# Patient Record
Sex: FEMALE | Race: WHITE | NOT HISPANIC OR LATINO | Employment: FULL TIME | URBAN - METROPOLITAN AREA
[De-identification: names, ages, dates, MRNs, and addresses within clinical notes are randomized per-mention and may not be internally consistent; named-entity substitution may affect disease eponyms.]

---

## 2018-08-24 ENCOUNTER — CONVERSION ENCOUNTER (OUTPATIENT)
Dept: SURGERY | Facility: CLINIC | Age: 23
End: 2018-08-24

## 2018-08-24 ENCOUNTER — OFFICE VISIT CONVERTED (OUTPATIENT)
Dept: SURGERY | Facility: CLINIC | Age: 23
End: 2018-08-24
Attending: SURGERY

## 2019-10-30 ENCOUNTER — HOSPITAL ENCOUNTER (OUTPATIENT)
Dept: LABOR AND DELIVERY | Facility: HOSPITAL | Age: 24
Discharge: HOME | End: 2019-10-30
Attending: OBSTETRICS & GYNECOLOGY

## 2019-10-30 LAB
ALBUMIN SERPL-MCNC: 3.3 G/DL (ref 3.5–5)
ALBUMIN/GLOB SERPL: 0.9 {RATIO} (ref 1.4–2.6)
ALP SERPL-CCNC: 151 U/L (ref 42–98)
ALT SERPL-CCNC: 6 U/L (ref 10–40)
ANION GAP SERPL CALC-SCNC: 16 MMOL/L (ref 8–19)
AST SERPL-CCNC: 12 U/L (ref 15–50)
BASOPHILS # BLD AUTO: 0.02 10*3/UL (ref 0–0.2)
BASOPHILS NFR BLD AUTO: 0.2 % (ref 0–3)
BILIRUB SERPL-MCNC: 0.23 MG/DL (ref 0.2–1.3)
BUN SERPL-MCNC: 9 MG/DL (ref 5–25)
BUN/CREAT SERPL: 10 {RATIO} (ref 6–20)
CALCIUM SERPL-MCNC: 9.7 MG/DL (ref 8.7–10.4)
CHLORIDE SERPL-SCNC: 101 MMOL/L (ref 99–111)
CONV ABS IMM GRAN: 0.03 10*3/UL (ref 0–0.2)
CONV CO2: 20 MMOL/L (ref 22–32)
CONV CREATININE URINE, RANDOM: 143.2 MG/DL (ref 10–300)
CONV IMMATURE GRAN: 0.3 % (ref 0–1.8)
CONV PROTEIN TO CREATININE RATIO (RANDOM URINE): 0.13 {RATIO} (ref 0–0.1)
CONV TOTAL PROTEIN: 6.8 G/DL (ref 6.3–8.2)
CREAT UR-MCNC: 0.89 MG/DL (ref 0.5–0.9)
DEPRECATED RDW RBC AUTO: 43.3 FL (ref 36.4–46.3)
EOSINOPHIL # BLD AUTO: 0.03 10*3/UL (ref 0–0.7)
EOSINOPHIL # BLD AUTO: 0.3 % (ref 0–7)
ERYTHROCYTE [DISTWIDTH] IN BLOOD BY AUTOMATED COUNT: 14.8 % (ref 11.7–14.4)
GFR SERPLBLD BASED ON 1.73 SQ M-ARVRAT: >60 ML/MIN/{1.73_M2}
GLOBULIN UR ELPH-MCNC: 3.5 G/DL (ref 2–3.5)
GLUCOSE SERPL-MCNC: 90 MG/DL (ref 65–99)
HCT VFR BLD AUTO: 33.8 % (ref 37–47)
HGB BLD-MCNC: 10.6 G/DL (ref 12–16)
LYMPHOCYTES # BLD AUTO: 1.84 10*3/UL (ref 1–5)
LYMPHOCYTES NFR BLD AUTO: 21 % (ref 20–45)
MCH RBC QN AUTO: 25.7 PG (ref 27–31)
MCHC RBC AUTO-ENTMCNC: 31.4 G/DL (ref 33–37)
MCV RBC AUTO: 81.8 FL (ref 81–99)
MONOCYTES # BLD AUTO: 0.38 10*3/UL (ref 0.2–1.2)
MONOCYTES NFR BLD AUTO: 4.3 % (ref 3–10)
NEUTROPHILS # BLD AUTO: 6.48 10*3/UL (ref 2–8)
NEUTROPHILS NFR BLD AUTO: 73.9 % (ref 30–85)
NRBC CBCN: 0 % (ref 0–0.7)
OSMOLALITY SERPL CALC.SUM OF ELEC: 274 MOSM/KG (ref 273–304)
PLATELET # BLD AUTO: 246 10*3/UL (ref 130–400)
PMV BLD AUTO: 11.2 FL (ref 9.4–12.3)
POTASSIUM SERPL-SCNC: 4.3 MMOL/L (ref 3.5–5.3)
PROT UR-MCNC: 18.2 MG/DL
RBC # BLD AUTO: 4.13 10*6/UL (ref 4.2–5.4)
SODIUM SERPL-SCNC: 133 MMOL/L (ref 135–147)
WBC # BLD AUTO: 8.78 10*3/UL (ref 4.8–10.8)

## 2019-11-01 ENCOUNTER — HOSPITAL ENCOUNTER (OUTPATIENT)
Dept: LABOR AND DELIVERY | Facility: HOSPITAL | Age: 24
Discharge: HOME | End: 2019-11-01
Attending: OBSTETRICS & GYNECOLOGY

## 2020-05-21 ENCOUNTER — OFFICE VISIT CONVERTED (OUTPATIENT)
Dept: FAMILY MEDICINE CLINIC | Facility: CLINIC | Age: 25
End: 2020-05-21
Attending: INTERNAL MEDICINE

## 2020-05-21 ENCOUNTER — HOSPITAL ENCOUNTER (OUTPATIENT)
Dept: LAB | Facility: HOSPITAL | Age: 25
Discharge: HOME | End: 2020-05-21
Attending: INTERNAL MEDICINE

## 2020-05-21 LAB
25(OH)D3 SERPL-MCNC: 10.4 NG/ML (ref 30–100)
ALBUMIN SERPL-MCNC: 4 G/DL (ref 3.5–5)
ALBUMIN/GLOB SERPL: 1.2 {RATIO} (ref 1.4–2.6)
ALP SERPL-CCNC: 92 U/L (ref 42–98)
ALT SERPL-CCNC: 8 U/L (ref 10–40)
ANION GAP SERPL CALC-SCNC: 16 MMOL/L (ref 8–19)
AST SERPL-CCNC: 12 U/L (ref 15–50)
BASOPHILS # BLD AUTO: 0.03 10*3/UL (ref 0–0.2)
BASOPHILS NFR BLD AUTO: 0.5 % (ref 0–3)
BILIRUB SERPL-MCNC: 0.33 MG/DL (ref 0.2–1.3)
BUN SERPL-MCNC: 15 MG/DL (ref 5–25)
BUN/CREAT SERPL: 19 {RATIO} (ref 6–20)
CALCIUM SERPL-MCNC: 8.8 MG/DL (ref 8.7–10.4)
CHLORIDE SERPL-SCNC: 106 MMOL/L (ref 99–111)
CHOLEST SERPL-MCNC: 124 MG/DL (ref 107–200)
CHOLEST/HDLC SERPL: 3.1 {RATIO} (ref 3–6)
CONV ABS IMM GRAN: 0.01 10*3/UL (ref 0–0.2)
CONV CO2: 22 MMOL/L (ref 22–32)
CONV IMMATURE GRAN: 0.2 % (ref 0–1.8)
CONV TOTAL PROTEIN: 7.4 G/DL (ref 6.3–8.2)
CREAT UR-MCNC: 0.8 MG/DL (ref 0.5–0.9)
DEPRECATED RDW RBC AUTO: 41.7 FL (ref 36.4–46.3)
EOSINOPHIL # BLD AUTO: 0.25 10*3/UL (ref 0–0.7)
EOSINOPHIL # BLD AUTO: 4.1 % (ref 0–7)
ERYTHROCYTE [DISTWIDTH] IN BLOOD BY AUTOMATED COUNT: 14.2 % (ref 11.7–14.4)
EST. AVERAGE GLUCOSE BLD GHB EST-MCNC: 103 MG/DL
FOLATE SERPL-MCNC: 7.1 NG/ML (ref 4.8–20)
GFR SERPLBLD BASED ON 1.73 SQ M-ARVRAT: >60 ML/MIN/{1.73_M2}
GLOBULIN UR ELPH-MCNC: 3.4 G/DL (ref 2–3.5)
GLUCOSE SERPL-MCNC: 94 MG/DL (ref 65–99)
HBA1C MFR BLD: 5.2 % (ref 3.5–5.7)
HCT VFR BLD AUTO: 38.4 % (ref 37–47)
HDLC SERPL-MCNC: 40 MG/DL (ref 40–60)
HGB BLD-MCNC: 11.5 G/DL (ref 12–16)
LDLC SERPL CALC-MCNC: 71 MG/DL (ref 70–100)
LYMPHOCYTES # BLD AUTO: 1.82 10*3/UL (ref 1–5)
LYMPHOCYTES NFR BLD AUTO: 29.9 % (ref 20–45)
MCH RBC QN AUTO: 24.4 PG (ref 27–31)
MCHC RBC AUTO-ENTMCNC: 29.9 G/DL (ref 33–37)
MCV RBC AUTO: 81.5 FL (ref 81–99)
MONOCYTES # BLD AUTO: 0.27 10*3/UL (ref 0.2–1.2)
MONOCYTES NFR BLD AUTO: 4.4 % (ref 3–10)
NEUTROPHILS # BLD AUTO: 3.71 10*3/UL (ref 2–8)
NEUTROPHILS NFR BLD AUTO: 60.9 % (ref 30–85)
NRBC CBCN: 0 % (ref 0–0.7)
OSMOLALITY SERPL CALC.SUM OF ELEC: 289 MOSM/KG (ref 273–304)
PLATELET # BLD AUTO: 277 10*3/UL (ref 130–400)
PMV BLD AUTO: 11 FL (ref 9.4–12.3)
POTASSIUM SERPL-SCNC: 4.5 MMOL/L (ref 3.5–5.3)
RBC # BLD AUTO: 4.71 10*6/UL (ref 4.2–5.4)
SODIUM SERPL-SCNC: 139 MMOL/L (ref 135–147)
T4 FREE SERPL-MCNC: 1.2 NG/DL (ref 0.9–1.8)
TRIGL SERPL-MCNC: 67 MG/DL (ref 40–150)
TSH SERPL-ACNC: 1.18 M[IU]/L (ref 0.27–4.2)
VIT B12 SERPL-MCNC: 524 PG/ML (ref 211–911)
VLDLC SERPL-MCNC: 13 MG/DL (ref 5–37)
WBC # BLD AUTO: 6.09 10*3/UL (ref 4.8–10.8)

## 2020-07-20 ENCOUNTER — HOSPITAL ENCOUNTER (OUTPATIENT)
Dept: URGENT CARE | Facility: CLINIC | Age: 25
Discharge: HOME | End: 2020-07-20

## 2020-07-21 ENCOUNTER — HOSPITAL ENCOUNTER (OUTPATIENT)
Dept: URGENT CARE | Facility: CLINIC | Age: 25
Discharge: HOME | End: 2020-07-21
Attending: NURSE PRACTITIONER

## 2020-07-21 LAB — SARS-COV-2 RNA SPEC QL NAA+PROBE: NOT DETECTED

## 2020-07-22 LAB — BACTERIA SPEC AEROBE CULT: NORMAL

## 2020-07-23 LAB — SARS-COV-2 RNA SPEC QL NAA+PROBE: NOT DETECTED

## 2020-10-14 ENCOUNTER — HOSPITAL ENCOUNTER (OUTPATIENT)
Dept: LABOR AND DELIVERY | Facility: HOSPITAL | Age: 25
Discharge: HOME | End: 2020-10-14
Attending: OBSTETRICS & GYNECOLOGY

## 2020-10-14 LAB
ALBUMIN SERPL-MCNC: 3.2 G/DL (ref 3.5–5)
ALBUMIN/GLOB SERPL: 0.9 {RATIO} (ref 1.4–2.6)
ALP SERPL-CCNC: 79 U/L (ref 42–98)
ALT SERPL-CCNC: 9 U/L (ref 10–40)
ANION GAP SERPL CALC-SCNC: 13 MMOL/L (ref 8–19)
APPEARANCE UR: CLEAR
AST SERPL-CCNC: 10 U/L (ref 15–50)
BASOPHILS # BLD AUTO: 0.01 10*3/UL (ref 0–0.2)
BASOPHILS NFR BLD AUTO: 0.1 % (ref 0–3)
BILIRUB SERPL-MCNC: 0.34 MG/DL (ref 0.2–1.3)
BILIRUB UR QL: NEGATIVE
BUN SERPL-MCNC: 6 MG/DL (ref 5–25)
BUN/CREAT SERPL: 9 {RATIO} (ref 6–20)
CALCIUM SERPL-MCNC: 9.9 MG/DL (ref 8.7–10.4)
CHLORIDE SERPL-SCNC: 101 MMOL/L (ref 99–111)
COLOR UR: YELLOW
CONV ABS IMM GRAN: 0.02 10*3/UL (ref 0–0.2)
CONV CO2: 20 MMOL/L (ref 22–32)
CONV COLLECTION SOURCE (UA): NORMAL
CONV CREATININE URINE, RANDOM: 104.4 MG/DL (ref 10–300)
CONV IMMATURE GRAN: 0.3 % (ref 0–1.8)
CONV PROTEIN TO CREATININE RATIO (RANDOM URINE): 0.14 {RATIO} (ref 0–0.1)
CONV TOTAL PROTEIN: 6.8 G/DL (ref 6.3–8.2)
CONV UROBILINOGEN IN URINE BY AUTOMATED TEST STRIP: 1 {EHRLICHU}/DL (ref 0.1–1)
CREAT UR-MCNC: 0.67 MG/DL (ref 0.5–0.9)
DEPRECATED RDW RBC AUTO: 48.6 FL (ref 36.4–46.3)
EOSINOPHIL # BLD AUTO: 0.08 10*3/UL (ref 0–0.7)
EOSINOPHIL # BLD AUTO: 1.2 % (ref 0–7)
ERYTHROCYTE [DISTWIDTH] IN BLOOD BY AUTOMATED COUNT: 16.1 % (ref 11.7–14.4)
GFR SERPLBLD BASED ON 1.73 SQ M-ARVRAT: >60 ML/MIN/{1.73_M2}
GLOBULIN UR ELPH-MCNC: 3.6 G/DL (ref 2–3.5)
GLUCOSE SERPL-MCNC: 97 MG/DL (ref 65–99)
GLUCOSE UR QL: NEGATIVE MG/DL
HCT VFR BLD AUTO: 31.7 % (ref 37–47)
HGB BLD-MCNC: 10.1 G/DL (ref 12–16)
HGB UR QL STRIP: NEGATIVE
KETONES UR QL STRIP: NEGATIVE MG/DL
LEUKOCYTE ESTERASE UR QL STRIP: NEGATIVE
LYMPHOCYTES # BLD AUTO: 1.2 10*3/UL (ref 1–5)
LYMPHOCYTES NFR BLD AUTO: 17.7 % (ref 20–45)
MCH RBC QN AUTO: 26.3 PG (ref 27–31)
MCHC RBC AUTO-ENTMCNC: 31.9 G/DL (ref 33–37)
MCV RBC AUTO: 82.6 FL (ref 81–99)
MONOCYTES # BLD AUTO: 0.29 10*3/UL (ref 0.2–1.2)
MONOCYTES NFR BLD AUTO: 4.3 % (ref 3–10)
NEUTROPHILS # BLD AUTO: 5.17 10*3/UL (ref 2–8)
NEUTROPHILS NFR BLD AUTO: 76.4 % (ref 30–85)
NITRITE UR QL STRIP: NEGATIVE
NRBC CBCN: 0 % (ref 0–0.7)
OSMOLALITY SERPL CALC.SUM OF ELEC: 268 MOSM/KG (ref 273–304)
PH UR STRIP.AUTO: 5 [PH] (ref 5–8)
PLATELET # BLD AUTO: 222 10*3/UL (ref 130–400)
PMV BLD AUTO: 10.7 FL (ref 9.4–12.3)
POTASSIUM SERPL-SCNC: 4.3 MMOL/L (ref 3.5–5.3)
PROT UR QL: NEGATIVE MG/DL
PROT UR-MCNC: 14.1 MG/DL
RBC # BLD AUTO: 3.84 10*6/UL (ref 4.2–5.4)
SODIUM SERPL-SCNC: 130 MMOL/L (ref 135–147)
SP GR UR: 1.02 (ref 1–1.03)
URATE SERPL-MCNC: 3.8 MG/DL (ref 2.5–6.2)
WBC # BLD AUTO: 6.77 10*3/UL (ref 4.8–10.8)

## 2020-11-24 ENCOUNTER — HOSPITAL ENCOUNTER (OUTPATIENT)
Dept: URGENT CARE | Facility: CLINIC | Age: 25
Discharge: HOME | End: 2020-11-24
Attending: NURSE PRACTITIONER

## 2020-11-26 LAB — BACTERIA SPEC AEROBE CULT: NORMAL

## 2020-11-28 LAB — SARS-COV-2 RNA SPEC QL NAA+PROBE: DETECTED

## 2021-01-25 ENCOUNTER — HOSPITAL ENCOUNTER (OUTPATIENT)
Dept: LABOR AND DELIVERY | Facility: HOSPITAL | Age: 26
Discharge: HOME | End: 2021-01-25
Attending: OBSTETRICS & GYNECOLOGY

## 2021-01-25 LAB
ALBUMIN SERPL-MCNC: 3.2 G/DL (ref 3.5–5)
ALBUMIN/GLOB SERPL: 0.9 {RATIO} (ref 1.4–2.6)
ALP SERPL-CCNC: 144 U/L (ref 42–98)
ALT SERPL-CCNC: 6 U/L (ref 10–40)
ANION GAP SERPL CALC-SCNC: 14 MMOL/L (ref 8–19)
AST SERPL-CCNC: 9 U/L (ref 15–50)
BASOPHILS # BLD AUTO: 0.02 10*3/UL (ref 0–0.2)
BASOPHILS NFR BLD AUTO: 0.2 % (ref 0–3)
BILIRUB SERPL-MCNC: 0.5 MG/DL (ref 0.2–1.3)
BUN SERPL-MCNC: 5 MG/DL (ref 5–25)
BUN/CREAT SERPL: 9 {RATIO} (ref 6–20)
CALCIUM SERPL-MCNC: 9.3 MG/DL (ref 8.7–10.4)
CHLORIDE SERPL-SCNC: 104 MMOL/L (ref 99–111)
CONV ABS IMM GRAN: 0.04 10*3/UL (ref 0–0.2)
CONV CO2: 19 MMOL/L (ref 22–32)
CONV CREATININE URINE, RANDOM: 144.8 MG/DL (ref 10–300)
CONV IMMATURE GRAN: 0.5 % (ref 0–1.8)
CONV PROTEIN TO CREATININE RATIO (RANDOM URINE): 0.11 {RATIO} (ref 0–0.1)
CONV TOTAL PROTEIN: 6.8 G/DL (ref 6.3–8.2)
CREAT UR-MCNC: 0.57 MG/DL (ref 0.5–0.9)
DEPRECATED RDW RBC AUTO: 43.5 FL (ref 36.4–46.3)
EOSINOPHIL # BLD AUTO: 0.08 10*3/UL (ref 0–0.7)
EOSINOPHIL # BLD AUTO: 0.9 % (ref 0–7)
ERYTHROCYTE [DISTWIDTH] IN BLOOD BY AUTOMATED COUNT: 15.8 % (ref 11.7–14.4)
GFR SERPLBLD BASED ON 1.73 SQ M-ARVRAT: >60 ML/MIN/{1.73_M2}
GLOBULIN UR ELPH-MCNC: 3.6 G/DL (ref 2–3.5)
GLUCOSE SERPL-MCNC: 94 MG/DL (ref 65–99)
HCT VFR BLD AUTO: 32.4 % (ref 37–47)
HGB BLD-MCNC: 10.1 G/DL (ref 12–16)
LYMPHOCYTES # BLD AUTO: 1.83 10*3/UL (ref 1–5)
LYMPHOCYTES NFR BLD AUTO: 20.9 % (ref 20–45)
MCH RBC QN AUTO: 24 PG (ref 27–31)
MCHC RBC AUTO-ENTMCNC: 31.2 G/DL (ref 33–37)
MCV RBC AUTO: 77 FL (ref 81–99)
MONOCYTES # BLD AUTO: 0.46 10*3/UL (ref 0.2–1.2)
MONOCYTES NFR BLD AUTO: 5.2 % (ref 3–10)
NEUTROPHILS # BLD AUTO: 6.34 10*3/UL (ref 2–8)
NEUTROPHILS NFR BLD AUTO: 72.3 % (ref 30–85)
NRBC CBCN: 0 % (ref 0–0.7)
OSMOLALITY SERPL CALC.SUM OF ELEC: 273 MOSM/KG (ref 273–304)
PLATELET # BLD AUTO: 251 10*3/UL (ref 130–400)
PMV BLD AUTO: 10.3 FL (ref 9.4–12.3)
POTASSIUM SERPL-SCNC: 3.6 MMOL/L (ref 3.5–5.3)
PROT UR-MCNC: 16.5 MG/DL
RBC # BLD AUTO: 4.21 10*6/UL (ref 4.2–5.4)
SODIUM SERPL-SCNC: 133 MMOL/L (ref 135–147)
WBC # BLD AUTO: 8.77 10*3/UL (ref 4.8–10.8)

## 2021-01-28 ENCOUNTER — HOSPITAL ENCOUNTER (OUTPATIENT)
Dept: LABOR AND DELIVERY | Facility: HOSPITAL | Age: 26
Discharge: HOME | End: 2021-01-28
Attending: OBSTETRICS & GYNECOLOGY

## 2021-02-01 ENCOUNTER — HOSPITAL ENCOUNTER (OUTPATIENT)
Dept: LABOR AND DELIVERY | Facility: HOSPITAL | Age: 26
Discharge: HOME | End: 2021-02-01
Attending: OBSTETRICS & GYNECOLOGY

## 2021-02-02 ENCOUNTER — HOSPITAL ENCOUNTER (OUTPATIENT)
Dept: LABOR AND DELIVERY | Facility: HOSPITAL | Age: 26
Discharge: HOME | End: 2021-02-02
Attending: OBSTETRICS & GYNECOLOGY

## 2021-02-02 LAB
ALBUMIN SERPL-MCNC: 3 G/DL (ref 3.5–5)
ALBUMIN/GLOB SERPL: 0.8 {RATIO} (ref 1.4–2.6)
ALP SERPL-CCNC: 157 U/L (ref 42–98)
ALT SERPL-CCNC: 7 U/L (ref 10–40)
ANION GAP SERPL CALC-SCNC: 13 MMOL/L (ref 8–19)
AST SERPL-CCNC: 10 U/L (ref 15–50)
BASOPHILS # BLD AUTO: 0.02 10*3/UL (ref 0–0.2)
BASOPHILS NFR BLD AUTO: 0.2 % (ref 0–3)
BILIRUB SERPL-MCNC: 0.54 MG/DL (ref 0.2–1.3)
BUN SERPL-MCNC: 5 MG/DL (ref 5–25)
BUN/CREAT SERPL: 9 {RATIO} (ref 6–20)
CALCIUM SERPL-MCNC: 9.4 MG/DL (ref 8.7–10.4)
CHLORIDE SERPL-SCNC: 103 MMOL/L (ref 99–111)
CONV ABS IMM GRAN: 0.03 10*3/UL (ref 0–0.2)
CONV CO2: 21 MMOL/L (ref 22–32)
CONV CREATININE URINE, RANDOM: 224.5 MG/DL (ref 10–300)
CONV IMMATURE GRAN: 0.3 % (ref 0–1.8)
CONV PROTEIN TO CREATININE RATIO (RANDOM URINE): 0.12 {RATIO} (ref 0–0.1)
CONV TOTAL PROTEIN: 6.6 G/DL (ref 6.3–8.2)
CREAT UR-MCNC: 0.53 MG/DL (ref 0.5–0.9)
DEPRECATED RDW RBC AUTO: 44.7 FL (ref 36.4–46.3)
EOSINOPHIL # BLD AUTO: 0.07 10*3/UL (ref 0–0.7)
EOSINOPHIL # BLD AUTO: 0.8 % (ref 0–7)
ERYTHROCYTE [DISTWIDTH] IN BLOOD BY AUTOMATED COUNT: 16.1 % (ref 11.7–14.4)
GFR SERPLBLD BASED ON 1.73 SQ M-ARVRAT: >60 ML/MIN/{1.73_M2}
GLOBULIN UR ELPH-MCNC: 3.6 G/DL (ref 2–3.5)
GLUCOSE SERPL-MCNC: 92 MG/DL (ref 65–99)
HCT VFR BLD AUTO: 31.7 % (ref 37–47)
HGB BLD-MCNC: 10.1 G/DL (ref 12–16)
LYMPHOCYTES # BLD AUTO: 1.64 10*3/UL (ref 1–5)
LYMPHOCYTES NFR BLD AUTO: 17.8 % (ref 20–45)
MCH RBC QN AUTO: 24.8 PG (ref 27–31)
MCHC RBC AUTO-ENTMCNC: 31.9 G/DL (ref 33–37)
MCV RBC AUTO: 77.7 FL (ref 81–99)
MONOCYTES # BLD AUTO: 0.41 10*3/UL (ref 0.2–1.2)
MONOCYTES NFR BLD AUTO: 4.4 % (ref 3–10)
NEUTROPHILS # BLD AUTO: 7.06 10*3/UL (ref 2–8)
NEUTROPHILS NFR BLD AUTO: 76.5 % (ref 30–85)
NRBC CBCN: 0 % (ref 0–0.7)
OSMOLALITY SERPL CALC.SUM OF ELEC: 273 MOSM/KG (ref 273–304)
PLATELET # BLD AUTO: 253 10*3/UL (ref 130–400)
PMV BLD AUTO: 10.5 FL (ref 9.4–12.3)
POTASSIUM SERPL-SCNC: 3.8 MMOL/L (ref 3.5–5.3)
PROT UR-MCNC: 26.3 MG/DL
RBC # BLD AUTO: 4.08 10*6/UL (ref 4.2–5.4)
SODIUM SERPL-SCNC: 133 MMOL/L (ref 135–147)
WBC # BLD AUTO: 9.23 10*3/UL (ref 4.8–10.8)

## 2021-02-04 ENCOUNTER — HOSPITAL ENCOUNTER (OUTPATIENT)
Dept: LABOR AND DELIVERY | Facility: HOSPITAL | Age: 26
Discharge: HOME | End: 2021-02-04
Attending: OBSTETRICS & GYNECOLOGY

## 2021-02-05 ENCOUNTER — HOSPITAL ENCOUNTER (OUTPATIENT)
Dept: PREADMISSION TESTING | Facility: HOSPITAL | Age: 26
Discharge: HOME | End: 2021-02-05
Attending: OBSTETRICS & GYNECOLOGY

## 2021-02-06 LAB — SARS-COV-2 RNA SPEC QL NAA+PROBE: NOT DETECTED

## 2021-02-08 ENCOUNTER — HOSPITAL ENCOUNTER (OUTPATIENT)
Dept: LABOR AND DELIVERY | Facility: HOSPITAL | Age: 26
Discharge: HOME | End: 2021-02-08
Attending: OBSTETRICS & GYNECOLOGY

## 2021-02-10 ENCOUNTER — HOSPITAL ENCOUNTER (OUTPATIENT)
Dept: LABOR AND DELIVERY | Facility: HOSPITAL | Age: 26
Discharge: HOME | End: 2021-02-10
Attending: OBSTETRICS & GYNECOLOGY

## 2021-02-12 ENCOUNTER — HOSPITAL ENCOUNTER (OUTPATIENT)
Dept: LABOR AND DELIVERY | Facility: HOSPITAL | Age: 26
Discharge: HOME | End: 2021-02-12
Attending: OBSTETRICS & GYNECOLOGY

## 2021-02-12 ENCOUNTER — HOSPITAL ENCOUNTER (OUTPATIENT)
Dept: PREADMISSION TESTING | Facility: HOSPITAL | Age: 26
Discharge: HOME | End: 2021-02-12
Attending: OBSTETRICS & GYNECOLOGY

## 2021-02-12 LAB
ALBUMIN SERPL-MCNC: 3.1 G/DL (ref 3.5–5)
ALBUMIN/GLOB SERPL: 0.8 {RATIO} (ref 1.4–2.6)
ALP SERPL-CCNC: 166 U/L (ref 42–98)
ALT SERPL-CCNC: 7 U/L (ref 10–40)
ANION GAP SERPL CALC-SCNC: 14 MMOL/L (ref 8–19)
AST SERPL-CCNC: 10 U/L (ref 15–50)
BASOPHILS # BLD AUTO: 0.02 10*3/UL (ref 0–0.2)
BASOPHILS NFR BLD AUTO: 0.2 % (ref 0–3)
BILIRUB SERPL-MCNC: 0.38 MG/DL (ref 0.2–1.3)
BUN SERPL-MCNC: 6 MG/DL (ref 5–25)
BUN/CREAT SERPL: 11 {RATIO} (ref 6–20)
CALCIUM SERPL-MCNC: 9 MG/DL (ref 8.7–10.4)
CHLORIDE SERPL-SCNC: 107 MMOL/L (ref 99–111)
CONV ABS IMM GRAN: 0.04 10*3/UL (ref 0–0.2)
CONV CO2: 19 MMOL/L (ref 22–32)
CONV CREATININE URINE, RANDOM: 137.7 MG/DL (ref 10–300)
CONV IMMATURE GRAN: 0.5 % (ref 0–1.8)
CONV PROTEIN TO CREATININE RATIO (RANDOM URINE): 0.16 {RATIO} (ref 0–0.1)
CONV TOTAL PROTEIN: 7 G/DL (ref 6.3–8.2)
CREAT UR-MCNC: 0.54 MG/DL (ref 0.5–0.9)
DEPRECATED RDW RBC AUTO: 45.1 FL (ref 36.4–46.3)
EOSINOPHIL # BLD AUTO: 0.14 10*3/UL (ref 0–0.7)
EOSINOPHIL # BLD AUTO: 1.6 % (ref 0–7)
ERYTHROCYTE [DISTWIDTH] IN BLOOD BY AUTOMATED COUNT: 16.2 % (ref 11.7–14.4)
GFR SERPLBLD BASED ON 1.73 SQ M-ARVRAT: >60 ML/MIN/{1.73_M2}
GLOBULIN UR ELPH-MCNC: 3.9 G/DL (ref 2–3.5)
GLUCOSE SERPL-MCNC: 101 MG/DL (ref 65–99)
HCT VFR BLD AUTO: 31.7 % (ref 37–47)
HGB BLD-MCNC: 10.1 G/DL (ref 12–16)
LYMPHOCYTES # BLD AUTO: 1.56 10*3/UL (ref 1–5)
LYMPHOCYTES NFR BLD AUTO: 18.2 % (ref 20–45)
MCH RBC QN AUTO: 24.5 PG (ref 27–31)
MCHC RBC AUTO-ENTMCNC: 31.9 G/DL (ref 33–37)
MCV RBC AUTO: 76.8 FL (ref 81–99)
MONOCYTES # BLD AUTO: 0.45 10*3/UL (ref 0.2–1.2)
MONOCYTES NFR BLD AUTO: 5.3 % (ref 3–10)
NEUTROPHILS # BLD AUTO: 6.34 10*3/UL (ref 2–8)
NEUTROPHILS NFR BLD AUTO: 74.2 % (ref 30–85)
NRBC CBCN: 0 % (ref 0–0.7)
OSMOLALITY SERPL CALC.SUM OF ELEC: 280 MOSM/KG (ref 273–304)
PLATELET # BLD AUTO: 250 10*3/UL (ref 130–400)
PMV BLD AUTO: 10.6 FL (ref 9.4–12.3)
POTASSIUM SERPL-SCNC: 4.1 MMOL/L (ref 3.5–5.3)
PROT UR-MCNC: 21.6 MG/DL
RBC # BLD AUTO: 4.13 10*6/UL (ref 4.2–5.4)
SODIUM SERPL-SCNC: 136 MMOL/L (ref 135–147)
WBC # BLD AUTO: 8.55 10*3/UL (ref 4.8–10.8)

## 2021-02-13 LAB — SARS-COV-2 RNA SPEC QL NAA+PROBE: NOT DETECTED

## 2021-05-13 NOTE — PROGRESS NOTES
Progress Note      Patient Name: Purvi Foreman   Patient ID: [id number]   Sex: Female   YOB: 1995    Primary Care Provider: Ryan Craft DO    Visit Date: May 21, 2020    Provider: Ryan Craft DO   Location: [facility]   Location Address: [address] [place] [address], [address]  [place], [place]  [id number]   Location Phone: [phone]          Chief Complaint  · NEW PATIENT ESTABLISH CARE      History Of Present Illness  Purvi Foreman is a 25 year old /White female who presents for evaluation and treatment of:      Patient is here today to establish care.    Says she would like to discuss weight loss. She tried phentermine before for a few months and lost 40 lbs on it.  She would like to try it again unless you recommend something else. Patient and I discussed at length positive and benefits of stimulant use. We discussed a modified Keto/Paleo Diet and diet tracking. We discussed her doing this for a month before we would start [place].       Past Medical History  Disease Name Date Onset Notes   Pap smear for cervical cancer screening 04/2019 --          Past Surgical History  Procedure Name Date Notes   Appendectomy --  --          Medication List  Name Date Started Instructions   Claritin 10 mg oral tablet  take 1 tablet (10 mg) by oral route once daily   fluoxetine 40 mg oral capsule  take 1 capsule (40 mg) by oral route once daily in the evening   hydrocodone-acetaminophen 5-325 mg oral tablet  take 1 tablet by oral route every 6 hours as needed for pain   Vitamin D2 1,250 mcg (50,000 unit) oral capsule 05/22/2020 take 1 capsule once a week on same day for 12 weeks.         Allergy List  Allergen Name Date Reaction Notes   Latex Exam Gloves --  --  --    PENICILLINS --  --  --        Allergies Reconciled  Family Medical History  Disease Name Relative/Age Notes   Hypertension Father/  Mother/   --    Cancer, Unspecified Grandfather (maternal)/  Grandmother  "(paternal)/   --    Diabetes, unspecified type Father/   Father         Social History  Finding Status Start/Stop Quantity Notes   Alcohol Current some day --/-- --  05/21/2020 - drinks rarely   Caffeine Current every day --/-- --  drinks regularly    --  --/-- --  --    Second hand smoke exposure Never --/-- --  no   Tobacco Never --/-- --  never a smoker         Immunizations  NameDate Admin Mfg Trade Name Lot Number Route Inj VIS Given VIS Publication   Bnpieqrnc25/01/2019 [employer] Fluzone Quadrivalent  [facility] 05/21/2020    Comments:    Tdap01/01/2020 [employer] BOOSTRIX  [facility] 05/21/2020    Comments:          Review of Systems  · Constitutional  o Admits  o : fatigue, weight gain  o Denies  o : night sweats, weight loss  · Eyes  o Denies  o : double vision, blurred vision  · HENT  o Denies  o : vertigo, recent head injury  · Cardiovascular  o Denies  o : chest pain, irregular heart beats  · Respiratory  o Denies  o : shortness of breath, productive cough  · Gastrointestinal  o Denies  o : nausea, vomiting  · Genitourinary  o Denies  o : dysuria, urinary retention  · Integument  o Denies  o : hair growth change, new skin lesions  · Neurologic  o Denies  o : altered mental status, seizures  · Musculoskeletal  o Denies  o : joint swelling, limitation of motion  · Endocrine  o Denies  o : cold intolerance, heat intolerance  · Psychiatric  o Denies  o : anxiety, depression  · Heme-Lymph  o Denies  o : petechiae, lymph node enlargement or tenderness  · Allergic-Immunologic  o Denies  o : frequent illnesses      Vitals  Date Time BP Position Site L\R Cuff Size HR RR TEMP (F) WT  HT  BMI kg/m2 BSA m2 O2 Sat        05/21/2020 08:34 /69 Sitting    81 - R   280lbs 0oz 5'  11\" 39.05 2.52 100 %          Physical Examination  · Constitutional  o Appearance  o : alert, oriented, in no acute distress, well developed, well-nourished, obese  · Eyes  o Vision  o : Conjuntivae: Normal, Sclerae white, Pupils: PERRL, Cornea: Clear, " no lesions bilateral  · Ears, Nose, Mouth and Throat  o Ears  o : Ext. Ears: Normal shape, Non tender, EACs: Normal , Tragus intact bilaterally, Hearing: intact to conversational voice bilaterally  o Nose  o : No nasal discharge, Mucosa: normal, Septum: midline, Sinuses: Nontender  o Throat  o : Oropharynx: no inflammation or lesions, no purulence or drainage  · Neck  o Inspection/Palpation  o : Supple, no masses or tenderness, no deformities, Trachea: Midline, ROM: with in normal limits, no neck stiffness, no lymphadenopathy  o Thyroid  o : no thyromegaly, no palpabale masses  · Respiratory  o Auscultation of Lungs  o : normal breath sounds throughout, no wheeze, rhonchi, or crackles  · Cardiovascular  o Heart  o : Regular rate and rhythm, Normal S1,S2 , No cardiac murmers, No S3 or S4 gallop or rubs  · Gastrointestinal  o Abdominal Examination  o : abdomen soft, nontender, non distended, no rigidity, guarding, rebound tenderness, no ventral hernias present  o Liver and spleen  o : no hepatomegaly present, liver nontender to palpation, spleen not palpable  · Musculoskeletal  o General  o :   § General Musculoskeletal  § : No joint swelling or deformity., Muscle tone, strength, and development grossly normal.  · Skin and Subcutaneous Tissue  o General Inspection  o : no rashes on visible skin, normal skin color, warm and dry  o Digits and Nails  o : no clubbing, cyanosis, deformities or edema present, normal appearing nails  · Neurologic  o Mental Status Examination  o : alert and oriented to time, place, and person. Gait and Station: normal gait, able to stand without difficulty. CN 2-12 grossly intact   · Psychiatric  o Judgement and Insight  o : judgment and insight intact  o Mood and Affect  o : normal mood and affect          Assessment  · Impaired fasting glucose     790.21/R73.01  · Vitamin D deficiency     268.9/E55.9  · Screening for depression     V79.0/Z13.89  · Screening for lipid  disorders     V77.91/Z13.220  · Vitamin B12 deficiency     266.2/E53.8  · Routine lab draw     V72.60/Z01.89  · Screening for thyroid disorder     V77.0/Z13.29  · Establishing care with new doctor, encounter for     V65.8/Z76.89  Check basic labs as well as B12 level and thyroid levels. This could delay ability to lose weight.  · Morbid obesity     278.01/E66.01  See HPI, patient to do plan as discussed.       Plan  · Orders  o ACO-18: Negative screen for clinical depression using a standardized tool () - V79.0/Z13.89 - 05/21/2020  o Free T4 (64384) - V77.0/Z13.29 - 05/21/2020  o Hgb A1c [facility] (65973) - 790.21/R73.01 - 05/21/2020  o Physical, Primary Care Panel (CBC, CMP, Lipid, TSH) [facility] ([id number], 88462, 12038, 20681) - V77.91/Z13.220, V72.60/Z01.89, V77.0/Z13.29 - 05/21/2020  o Vitamin D (25-Hydroxy) Level (86557) - 268.9/E55.9 - 05/21/2020  o ACO-39: Current medications updated and reviewed () - - 05/21/2020  o ACO-14: Influenza immunization administered or previously received () - - 05/21/2020  o B12 Folate levels (B12FO) - 266.2/E53.8 - 05/21/2020  · Medications  o Medications have been Reconciled  o Transition of Care or Provider Policy  · Instructions  o Instructed patient to watch their diet and exercise.  o Take a daily over the counter Vitamin D supplement.  o Depression Screen completed and scanned into the EMR under the designated folder within the patient's documents.  o Today's PHQ-9 result is 3.  o Handouts were given to patient: Keto Diet  o Take all medications as prescribed/directed.  o Patient instructed/educated on their diet and exercise program.  o Patient was educated/instructed on their diagnosis, treatment and medications prior to discharge from the clinic today.  o Patient counseled to reduce calorie intake.  o Patient was instructed to exercise regularly.  o Patient instructed to seek medical attention urgently for new or worsening symptoms.  o Call the office with any concerns  or questions.  o Minutes spent with patient including greater than 50% in Education/Counseling/Care Coordination.  o Time spent with the patient was minutes, more than 50% face to face.  o Chronic conditions reviewed and taken into consideration for today's treatment plan.  o Discussed Covid-19 precautions including, but not limited to, social distancing, avoid touching your face, and hand washing.   · Disposition  o Follow up in one month.            Electronically Signed by: Ryan Craft DO -Author on May 27, 2020 02:25:44 PM

## 2021-05-15 VITALS
DIASTOLIC BLOOD PRESSURE: 69 MMHG | SYSTOLIC BLOOD PRESSURE: 125 MMHG | HEART RATE: 81 BPM | HEIGHT: 71 IN | BODY MASS INDEX: 39.2 KG/M2 | OXYGEN SATURATION: 100 % | WEIGHT: 280 LBS

## 2021-05-16 VITALS — BODY MASS INDEX: 36.4 KG/M2 | RESPIRATION RATE: 16 BRPM | HEIGHT: 71 IN | WEIGHT: 260 LBS

## 2021-12-02 ENCOUNTER — OFFICE VISIT (OUTPATIENT)
Dept: INTERNAL MEDICINE | Facility: CLINIC | Age: 26
End: 2021-12-02

## 2021-12-02 VITALS
DIASTOLIC BLOOD PRESSURE: 84 MMHG | WEIGHT: 287.2 LBS | HEART RATE: 124 BPM | OXYGEN SATURATION: 98 % | TEMPERATURE: 96.3 F | SYSTOLIC BLOOD PRESSURE: 140 MMHG | BODY MASS INDEX: 40.21 KG/M2 | HEIGHT: 71 IN

## 2021-12-02 DIAGNOSIS — F50.81 BINGE EATING DISORDER: Primary | ICD-10-CM

## 2021-12-02 DIAGNOSIS — Z13.220 SCREENING FOR LIPID DISORDERS: ICD-10-CM

## 2021-12-02 DIAGNOSIS — R73.9 HYPERGLYCEMIA: ICD-10-CM

## 2021-12-02 DIAGNOSIS — R79.89 ABNORMAL THYROID BLOOD TEST: ICD-10-CM

## 2021-12-02 PROBLEM — F50.819 BINGE EATING DISORDER: Status: ACTIVE | Noted: 2021-12-02

## 2021-12-02 PROCEDURE — 84443 ASSAY THYROID STIM HORMONE: CPT | Performed by: INTERNAL MEDICINE

## 2021-12-02 PROCEDURE — 80053 COMPREHEN METABOLIC PANEL: CPT | Performed by: INTERNAL MEDICINE

## 2021-12-02 PROCEDURE — 83036 HEMOGLOBIN GLYCOSYLATED A1C: CPT | Performed by: INTERNAL MEDICINE

## 2021-12-02 PROCEDURE — 80061 LIPID PANEL: CPT | Performed by: INTERNAL MEDICINE

## 2021-12-02 PROCEDURE — 85025 COMPLETE CBC W/AUTO DIFF WBC: CPT | Performed by: INTERNAL MEDICINE

## 2021-12-02 PROCEDURE — 99214 OFFICE O/P EST MOD 30 MIN: CPT | Performed by: INTERNAL MEDICINE

## 2021-12-02 RX ORDER — TOPIRAMATE 50 MG/1
50 TABLET, FILM COATED ORAL EVERY EVENING
COMMUNITY
Start: 2021-09-27 | End: 2021-12-02

## 2021-12-02 RX ORDER — LORATADINE 10 MG/1
TABLET ORAL AS NEEDED
COMMUNITY
End: 2023-01-13

## 2021-12-02 RX ORDER — ESCITALOPRAM OXALATE 20 MG/1
20 TABLET ORAL DAILY
COMMUNITY
Start: 2021-09-04 | End: 2022-02-10 | Stop reason: SDUPTHER

## 2021-12-02 NOTE — PROGRESS NOTES
"Chief Complaint  Weight Loss (wants to talk about weight loss )    Subjective          Purvi Foreman presents to [facility]  History of Present Illness  Weight loss- patient has tried keto diet. Patient has also went to a weight loss clinic where she tried phentermine and topamax. Patient reports walking 3 miles per day. Patient has lost 27 lbs in 3 months. Patient reports gaining 15 lbs since being off phentermine for 3 weeks. Patient reports difficulty with portion control. Patient reports snacking at nighttime. Patient denies any history of of elevated Blood Pressure.   Anxiety- patient reports being on lexapro and does well on it. Patient has been on for the past 1 year for postpartum depression. Patient was previously on prozac without much relief.   History of abnormal thyroid- due for recheck    Current Outpatient Medications   Medication Instructions   • escitalopram (LEXAPRO) 20 mg, Oral, Daily   • lisdexamfetamine (VYVANSE) 30 mg, Oral, Every Morning   • loratadine (Claritin) 10 MG tablet Claritin 10 mg oral tablet take 1 tablet (10 mg) by oral route once daily   Active       Objective   Vital Signs:   /84 (BP Location: Left arm, Patient Position: Sitting, Cuff Size: Large Adult)   Pulse (!) 124   Temp 96.3 °F (35.7 °C) (Temporal)   Ht 180.3 cm (71\")   Wt 130 kg (287 lb 3.2 oz)   SpO2 98%   BMI 40.06 kg/m²     Wt Readings from Last 3 Encounters:   12/02/21 130 kg (287 lb 3.2 oz)   05/21/20 127 kg (280 lb)   08/24/18 118 kg (260 lb)     BP Readings from Last 3 Encounters:   12/02/21 140/84   05/21/20 125/69     Physical Exam   Appearance: No acute distress, well-nourished  Head: normocephalic, atraumatic  Eyes: extraocular movements intact, no scleral icterus, no conjunctival injection  Ears, Nose, and Throat: external ears normal, nares patent, moist mucous membranes  Cardiovascular: regular rate and rhythm. no murmurs, rales, or rhonchi. no " edema  Respiratory: breathing comfortably, symmetric chest rise, clear to auscultation bilaterally. No wheezes, rales, or rhonchi.  Neuro: alert and oriented to time, place, and person. Normal gait  Psych: normal mood and affect     Result Review :   The following data was reviewed by: Kendrick Thompson Jr, MD on 12/02/2021:  Common labs    Common Labsle 2/2/21 2/2/21 2/12/21 2/12/21 2/13/21 2/13/21    1515 1515 1715 1715 0840 0840   Glucose  92  101 (A)  134 (A)   BUN  5  6  5   Creatinine  0.53  0.54  0.53   Sodium  133 (A)  136  134 (A)   Potassium  3.8  4.1  4.0   Chloride  103  107  105   Calcium  9.4  9.0  9.0   Albumin  3.0 (A)  3.1 (A)  3.0 (A)   Total Bilirubin  0.54  0.38  0.40   Alkaline Phosphatase  157 (A)  166 (A)  175 (A)   AST (SGOT)  10 (A)  10 (A)  14 (A)   ALT (SGPT)  7 (A)  7 (A)  6 (A)   WBC 9.23  8.55  8.58    Hemoglobin 10.1 (A)  10.1 (A)  9.7 (A)    Hematocrit 31.7 (A)  31.7 (A)  31.0 (A)    Platelets 253  250  247    (A) Abnormal value       Comments are available for some flowsheets but are not being displayed.              Assessment and Plan    Diagnoses and all orders for this visit:    1. Binge eating disorder (Primary)  Comments:  discussed options. will start vyvanse. consider switchf rom lexapro to possibly wellbutrin in future as well.   Orders:  -     lisdexamfetamine (Vyvanse) 30 MG capsule; Take 1 capsule by mouth Every Morning  Dispense: 30 capsule; Refill: 0  -     Comprehensive Metabolic Panel  -     CBC & Differential    2. Hyperglycemia  Comments:  noted on previous labs. check HgbA1c  Orders:  -     Comprehensive Metabolic Panel  -     CBC & Differential  -     Hemoglobin A1c    3. Abnormal thyroid blood test  Comments:  recheck thyroid profile. consider synthroid if needed.   Orders:  -     TSH    4. Screening for lipid disorders  -     Lipid Panel        Medications Discontinued During This Encounter   Medication Reason   • topiramate (TOPAMAX) 50 MG tablet *Therapy  completed        Follow Up   Return in about 4 weeks (around 12/30/2021) for Recheck.  Patient was given instructions and counseling regarding her condition or for health maintenance advice. Please see specific information pulled into the AVS if appropriate.

## 2021-12-03 LAB
ALBUMIN SERPL-MCNC: 4.2 G/DL (ref 3.5–5.2)
ALBUMIN/GLOB SERPL: 1.4 G/DL
ALP SERPL-CCNC: 97 U/L (ref 39–117)
ALT SERPL W P-5'-P-CCNC: 7 U/L (ref 1–33)
ANION GAP SERPL CALCULATED.3IONS-SCNC: 7 MMOL/L (ref 5–15)
AST SERPL-CCNC: 13 U/L (ref 1–32)
BASOPHILS # BLD AUTO: 0.04 10*3/MM3 (ref 0–0.2)
BASOPHILS NFR BLD AUTO: 0.5 % (ref 0–1.5)
BILIRUB SERPL-MCNC: 0.4 MG/DL (ref 0–1.2)
BUN SERPL-MCNC: 14 MG/DL (ref 6–20)
BUN/CREAT SERPL: 14.4 (ref 7–25)
CALCIUM SPEC-SCNC: 9.2 MG/DL (ref 8.6–10.5)
CHLORIDE SERPL-SCNC: 104 MMOL/L (ref 98–107)
CHOLEST SERPL-MCNC: 133 MG/DL (ref 0–200)
CO2 SERPL-SCNC: 24 MMOL/L (ref 22–29)
CREAT SERPL-MCNC: 0.97 MG/DL (ref 0.57–1)
DEPRECATED RDW RBC AUTO: 41.4 FL (ref 37–54)
EOSINOPHIL # BLD AUTO: 0.25 10*3/MM3 (ref 0–0.4)
EOSINOPHIL NFR BLD AUTO: 3.3 % (ref 0.3–6.2)
ERYTHROCYTE [DISTWIDTH] IN BLOOD BY AUTOMATED COUNT: 14.2 % (ref 12.3–15.4)
GFR SERPL CREATININE-BSD FRML MDRD: 69 ML/MIN/1.73
GLOBULIN UR ELPH-MCNC: 3.1 GM/DL
GLUCOSE SERPL-MCNC: 92 MG/DL (ref 65–99)
HBA1C MFR BLD: 5.14 % (ref 4.8–5.6)
HCT VFR BLD AUTO: 38.3 % (ref 34–46.6)
HDLC SERPL-MCNC: 34 MG/DL (ref 40–60)
HGB BLD-MCNC: 12.4 G/DL (ref 12–15.9)
IMM GRANULOCYTES # BLD AUTO: 0.01 10*3/MM3 (ref 0–0.05)
IMM GRANULOCYTES NFR BLD AUTO: 0.1 % (ref 0–0.5)
LDLC SERPL CALC-MCNC: 71 MG/DL (ref 0–100)
LDLC/HDLC SERPL: 1.97 {RATIO}
LYMPHOCYTES # BLD AUTO: 2.18 10*3/MM3 (ref 0.7–3.1)
LYMPHOCYTES NFR BLD AUTO: 29 % (ref 19.6–45.3)
MCH RBC QN AUTO: 26.4 PG (ref 26.6–33)
MCHC RBC AUTO-ENTMCNC: 32.4 G/DL (ref 31.5–35.7)
MCV RBC AUTO: 81.7 FL (ref 79–97)
MONOCYTES # BLD AUTO: 0.33 10*3/MM3 (ref 0.1–0.9)
MONOCYTES NFR BLD AUTO: 4.4 % (ref 5–12)
NEUTROPHILS NFR BLD AUTO: 4.7 10*3/MM3 (ref 1.7–7)
NEUTROPHILS NFR BLD AUTO: 62.7 % (ref 42.7–76)
NRBC BLD AUTO-RTO: 0 /100 WBC (ref 0–0.2)
PLATELET # BLD AUTO: 263 10*3/MM3 (ref 140–450)
PMV BLD AUTO: 11.5 FL (ref 6–12)
POTASSIUM SERPL-SCNC: 4.2 MMOL/L (ref 3.5–5.2)
PROT SERPL-MCNC: 7.3 G/DL (ref 6–8.5)
RBC # BLD AUTO: 4.69 10*6/MM3 (ref 3.77–5.28)
SODIUM SERPL-SCNC: 135 MMOL/L (ref 136–145)
TRIGL SERPL-MCNC: 160 MG/DL (ref 0–150)
TSH SERPL DL<=0.05 MIU/L-ACNC: 0.47 UIU/ML (ref 0.27–4.2)
VLDLC SERPL-MCNC: 28 MG/DL (ref 5–40)
WBC NRBC COR # BLD: 7.51 10*3/MM3 (ref 3.4–10.8)

## 2022-01-04 ENCOUNTER — OFFICE VISIT (OUTPATIENT)
Dept: INTERNAL MEDICINE | Facility: CLINIC | Age: 27
End: 2022-01-04

## 2022-01-04 VITALS
HEART RATE: 84 BPM | OXYGEN SATURATION: 98 % | TEMPERATURE: 98 F | BODY MASS INDEX: 40.48 KG/M2 | HEIGHT: 71 IN | SYSTOLIC BLOOD PRESSURE: 115 MMHG | DIASTOLIC BLOOD PRESSURE: 74 MMHG | WEIGHT: 289.13 LBS

## 2022-01-04 DIAGNOSIS — F50.81 BINGE EATING DISORDER: ICD-10-CM

## 2022-01-04 PROCEDURE — 99213 OFFICE O/P EST LOW 20 MIN: CPT | Performed by: INTERNAL MEDICINE

## 2022-01-04 NOTE — PROGRESS NOTES
"Chief Complaint  Binge Eating Disorder (4 week follow-up)    Subjective          Purvi Foreman presents to [facility]  History of Present Illness  Binge eating disorder- patient reports vyvanse seemingly helped with initiation. Patient reports wearing off quickly however. Patient is interested in increasing dosage as previosuly discussed. Patient reports sleeping well. Patient without noticeable side effects.   Patient tried weaning off lexapro without success.     Current Outpatient Medications   Medication Instructions   • escitalopram (LEXAPRO) 20 mg, Oral, Daily   • lisdexamfetamine (VYVANSE) 50 mg, Oral, Every Morning   • loratadine (Claritin) 10 MG tablet Claritin 10 mg oral tablet take 1 tablet (10 mg) by oral route once daily   Active     The following portions of the patient's history were reviewed and updated as appropriate: allergies, current medications, past family history, past medical history, past social history, past surgical history, and problem list.    Objective   Vital Signs:   /74 (BP Location: Right arm, Patient Position: Sitting, Cuff Size: Large Adult)   Pulse 84   Temp 98 °F (36.7 °C) (Temporal)   Ht 180.3 cm (71\")   Wt 131 kg (289 lb 2 oz)   SpO2 98%   BMI 40.32 kg/m²     Wt Readings from Last 3 Encounters:   01/04/22 131 kg (289 lb 2 oz)   12/02/21 130 kg (287 lb 3.2 oz)   05/21/20 127 kg (280 lb)     BP Readings from Last 3 Encounters:   01/04/22 115/74   12/02/21 140/84   05/21/20 125/69     Physical Exam   Appearance: No acute distress, well-nourished  Head: normocephalic, atraumatic  Eyes: extraocular movements intact, no scleral icterus, no conjunctival injection  Ears, Nose, and Throat: external ears normal, nares patent, moist mucous membranes  Cardiovascular: regular rate and rhythm. no edema  Respiratory: breathing comfortably, symmetric chest rise,  Neuro: alert and oriented to time, place, and person. Normal " gait  Psych: normal mood and affect     Result Review :   The following data was reviewed by: Kendrick Thompson Jr, MD on 01/04/2022:  Common labs    Common Labsle 2/12/21 2/12/21 2/13/21 2/13/21 12/2/21 12/2/21 12/2/21 12/2/21    1715 1715 0840 0840 1409 1409 1409 1409   Glucose  101 (A)  134 (A)    92   BUN  6  5    14   Creatinine  0.54  0.53    0.97   eGFR Non African Am        69   Sodium  136  134 (A)    135 (A)   Potassium  4.1  4.0    4.2   Chloride  107  105    104   Calcium  9.0  9.0    9.2   Albumin  3.1 (A)  3.0 (A)    4.20   Total Bilirubin  0.38  0.40    0.4   Alkaline Phosphatase  166 (A)  175 (A)    97   AST (SGOT)  10 (A)  14 (A)    13   ALT (SGPT)  7 (A)  6 (A)    7   WBC 8.55  8.58  7.51      Hemoglobin 10.1 (A)  9.7 (A)  12.4      Hematocrit 31.7 (A)  31.0 (A)  38.3      Platelets 250  247  263      Total Cholesterol       133    Triglycerides       160 (A)    HDL Cholesterol       34 (A)    LDL Cholesterol        71    Hemoglobin A1C      5.14     (A) Abnormal value       Comments are available for some flowsheets but are not being displayed.              Assessment and Plan    Diagnoses and all orders for this visit:    1. Binge eating disorder  Comments:  inc vyvanse to50mg. consider switch from lexapro to possibly wellbutrin in future as well. f/u in 4-6 weeks.   Orders:  -     lisdexamfetamine (Vyvanse) 50 MG capsule; Take 1 capsule by mouth Every Morning  Dispense: 30 capsule; Refill: 0        Medications Discontinued During This Encounter   Medication Reason   • lisdexamfetamine (Vyvanse) 30 MG capsule Reorder        Follow Up   Return in about 6 weeks (around 2/15/2022).  Patient was given instructions and counseling regarding her condition or for health maintenance advice. Please see specific information pulled into the AVS if appropriate.

## 2022-01-24 ENCOUNTER — PATIENT MESSAGE (OUTPATIENT)
Dept: INTERNAL MEDICINE | Facility: CLINIC | Age: 27
End: 2022-01-24

## 2022-01-25 RX ORDER — BUPROPION HYDROCHLORIDE 150 MG/1
150 TABLET, EXTENDED RELEASE ORAL 2 TIMES DAILY
Qty: 180 TABLET | Refills: 0 | Status: SHIPPED | OUTPATIENT
Start: 2022-01-25 | End: 2022-02-10 | Stop reason: SDUPTHER

## 2022-01-25 NOTE — TELEPHONE ENCOUNTER
From: Purvi Foreman  To: Kendrick Thompson Jr, MD  Sent: 1/24/2022 7:05 PM EST  Subject: Possible medication added?    I have a follow up with you February 10th and vyvanse 50mg seems to be working rather well, but I’m wondering if I can go ahead and get started on a anxiety/depression medicine as well? Lexapro 20mg is what I’m on currently from my OBGYN , but I’m needing something else I believe. I’m on edge and my patience is slim to none just about everyday. The littlest things irritate me when they shouldn’t and I don’t find myself nearly as happy as I have been. No thoughts of harming myself or others, just on edge more than I’ve been since I’ve started lexapro close to a year ago this coming March.

## 2022-02-02 ENCOUNTER — PATIENT MESSAGE (OUTPATIENT)
Dept: INTERNAL MEDICINE | Facility: CLINIC | Age: 27
End: 2022-02-02

## 2022-02-02 DIAGNOSIS — F50.81 BINGE EATING DISORDER: ICD-10-CM

## 2022-02-04 NOTE — TELEPHONE ENCOUNTER
From: Purvi Foreman  To: Kendrick Thompson Jr, MD  Sent: 2/2/2022 7:49 AM EST  Subject: Vyvanse refill?    I took my last pill this morning. Is it possible to get my prescription refilled or do I have to wait till my follow up appointment with you on 02/10/22?     Thank you!

## 2022-02-10 ENCOUNTER — OFFICE VISIT (OUTPATIENT)
Dept: INTERNAL MEDICINE | Facility: CLINIC | Age: 27
End: 2022-02-10

## 2022-02-10 VITALS
HEART RATE: 104 BPM | DIASTOLIC BLOOD PRESSURE: 86 MMHG | WEIGHT: 288.8 LBS | TEMPERATURE: 97.9 F | HEIGHT: 71 IN | SYSTOLIC BLOOD PRESSURE: 122 MMHG | BODY MASS INDEX: 40.43 KG/M2 | OXYGEN SATURATION: 99 %

## 2022-02-10 DIAGNOSIS — F41.9 ANXIETY: Primary | ICD-10-CM

## 2022-02-10 DIAGNOSIS — F50.81 BINGE EATING DISORDER: ICD-10-CM

## 2022-02-10 PROCEDURE — 99213 OFFICE O/P EST LOW 20 MIN: CPT | Performed by: INTERNAL MEDICINE

## 2022-02-10 RX ORDER — ESCITALOPRAM OXALATE 20 MG/1
20 TABLET ORAL DAILY
Qty: 90 TABLET | Refills: 3 | Status: SHIPPED | OUTPATIENT
Start: 2022-02-10 | End: 2022-03-17

## 2022-02-10 RX ORDER — BUPROPION HYDROCHLORIDE 150 MG/1
150 TABLET, EXTENDED RELEASE ORAL 2 TIMES DAILY
Qty: 180 TABLET | Refills: 0 | Status: SHIPPED | OUTPATIENT
Start: 2022-02-10 | End: 2022-04-01 | Stop reason: SDUPTHER

## 2022-02-10 NOTE — PROGRESS NOTES
"Chief Complaint  Follow-up, Binge eating disorder, and Med Dose Change (wants to increase vysanse)    Subjective          Purvi Foreman presents to [facility]  History of Present Illness  Anxiety- patient reports she is less on edge since being wellbutrin BID. Patient denies side effects. Patient also taking lexapro.   Binge eating- patient without any effects of vyvanse. Patient thinks it does suppress appetite some, but wears off in the evening. Amenable to higher dosage.     Current Outpatient Medications   Medication Instructions   • buPROPion SR (WELLBUTRIN SR) 150 mg, Oral, 2 Times Daily   • escitalopram (LEXAPRO) 20 mg, Oral, Daily   • lisdexamfetamine (VYVANSE) 70 mg, Oral, Every Morning   • loratadine (Claritin) 10 MG tablet Claritin 10 mg oral tablet take 1 tablet (10 mg) by oral route once daily   Active       The following portions of the patient's history were reviewed and updated as appropriate: allergies, current medications, past family history, past medical history, past social history, past surgical history, and problem list.    Objective   Vital Signs:   /86   Pulse 104   Temp 97.9 °F (36.6 °C) (Temporal)   Ht 180.3 cm (71\")   Wt 131 kg (288 lb 12.8 oz)   SpO2 99%   BMI 40.28 kg/m²     Wt Readings from Last 3 Encounters:   02/10/22 131 kg (288 lb 12.8 oz)   01/04/22 131 kg (289 lb 2 oz)   12/02/21 130 kg (287 lb 3.2 oz)     BP Readings from Last 3 Encounters:   02/10/22 122/86   01/04/22 115/74   12/02/21 140/84     Physical Exam   Appearance: No acute distress, well-nourished  Head: normocephalic, atraumatic  Eyes: extraocular movements intact, no scleral icterus, no conjunctival injection  Ears, Nose, and Throat: external ears normal, nares patent, moist mucous membranes  Cardiovascular: regular rate and rhythm. no murmurs, rales, or rhonchi. no edema  Respiratory: breathing comfortably, symmetric chest rise, clear to auscultation " bilaterally. No wheezes, rales, or rhonchi.  Neuro: alert and oriented to time, place, and person. Normal gait  Psych: normal mood and affect     Result Review :   The following data was reviewed by: Kendrick Thompson Jr, MD on 02/10/2022:  Common labs    Common Labsle 12/2/21 12/2/21 12/2/21 12/2/21    1409 1409 1409 1409   Glucose    92   BUN    14   Creatinine    0.97   eGFR Non African Am    69   Sodium    135 (A)   Potassium    4.2   Chloride    104   Calcium    9.2   Albumin    4.20   Total Bilirubin    0.4   Alkaline Phosphatase    97   AST (SGOT)    13   ALT (SGPT)    7   WBC 7.51      Hemoglobin 12.4      Hematocrit 38.3      Platelets 263      Total Cholesterol   133    Triglycerides   160 (A)    HDL Cholesterol   34 (A)    LDL Cholesterol    71    Hemoglobin A1C  5.14     (A) Abnormal value              Lab Results   Component Value Date    COVID19 NOT DETECTED 02/12/2021    INR 0.95 (L) 01/16/2019        Assessment and Plan    Diagnoses and all orders for this visit:    1. Anxiety (Primary)  Comments:  improved with wellbutrin. cont regimen.   Orders:  -     buPROPion SR (Wellbutrin SR) 150 MG 12 hr tablet; Take 1 tablet by mouth 2 (Two) Times a Day.  Dispense: 180 tablet; Refill: 0  -     escitalopram (LEXAPRO) 20 MG tablet; Take 1 tablet by mouth Daily.  Dispense: 90 tablet; Refill: 3    2. Binge eating disorder  Comments:  inc vyvanse to 70mg. f/u in 6-8 weeks.  Orders:  -     Discontinue: lisdexamfetamine (Vyvanse) 70 MG capsule; Take 1 capsule by mouth Every Morning  Dispense: 30 capsule; Refill: 0  -     lisdexamfetamine (Vyvanse) 70 MG capsule; Take 1 capsule by mouth Every Morning  Dispense: 30 capsule; Refill: 0        Medications Discontinued During This Encounter   Medication Reason   • lisdexamfetamine (Vyvanse) 50 MG capsule Reorder   • escitalopram (LEXAPRO) 20 MG tablet Reorder   • buPROPion SR (Wellbutrin SR) 150 MG 12 hr tablet Reorder   • lisdexamfetamine (Vyvanse) 70 MG capsule  Reorder      Follow Up   Return in about 2 months (around 4/10/2022) for Recheck.  Patient was given instructions and counseling regarding her condition or for health maintenance advice. Please see specific information pulled into the AVS if appropriate.

## 2022-03-01 ENCOUNTER — TELEPHONE (OUTPATIENT)
Dept: OBSTETRICS AND GYNECOLOGY | Facility: CLINIC | Age: 27
End: 2022-03-01

## 2022-03-01 RX ORDER — AZITHROMYCIN 250 MG/1
TABLET, FILM COATED ORAL
Qty: 7 TABLET | Refills: 0 | Status: SHIPPED | OUTPATIENT
Start: 2022-03-01 | End: 2022-03-06

## 2022-03-16 ENCOUNTER — PATIENT MESSAGE (OUTPATIENT)
Dept: INTERNAL MEDICINE | Facility: CLINIC | Age: 27
End: 2022-03-16

## 2022-03-16 DIAGNOSIS — F41.9 ANXIETY: ICD-10-CM

## 2022-03-17 RX ORDER — ESCITALOPRAM OXALATE 5 MG/1
5 TABLET ORAL DAILY
Qty: 14 TABLET | Refills: 0 | Status: SHIPPED | OUTPATIENT
Start: 2022-03-31 | End: 2022-04-26 | Stop reason: SDUPTHER

## 2022-03-17 RX ORDER — ESCITALOPRAM OXALATE 10 MG/1
10 TABLET ORAL DAILY
Qty: 14 TABLET | Refills: 0 | Status: SHIPPED | OUTPATIENT
Start: 2022-03-17 | End: 2022-04-01

## 2022-03-17 NOTE — TELEPHONE ENCOUNTER
From: Purvi Foreman  To: Kendrick Thompson Jr, MD  Sent: 3/16/2022 8:03 AM EDT  Subject: Sooner appointment/prescription change?    Good morning, I called yesterday for a sooner appointment possibly and there weren’t any and I’m not sure if this is something we can change without an appointment or what you prefer. The medication I’m on is not working for me. I took my last lexapro 20mg that was prescribed to me by  my OBGYN this morning and do not want to get it refilled. I do not feel like I’m making any progress with my mood swings or weight loss. I am however taking Wellbutrin first thing in the am along with my vyvanse. I’m not worse regarding mood swings, just not seeing any difference.

## 2022-04-01 ENCOUNTER — OFFICE VISIT (OUTPATIENT)
Dept: INTERNAL MEDICINE | Facility: CLINIC | Age: 27
End: 2022-04-01

## 2022-04-01 ENCOUNTER — TELEPHONE (OUTPATIENT)
Dept: INTERNAL MEDICINE | Facility: CLINIC | Age: 27
End: 2022-04-01

## 2022-04-01 VITALS
OXYGEN SATURATION: 98 % | BODY MASS INDEX: 40.04 KG/M2 | HEART RATE: 98 BPM | SYSTOLIC BLOOD PRESSURE: 120 MMHG | HEIGHT: 71 IN | WEIGHT: 286 LBS | TEMPERATURE: 97.3 F | DIASTOLIC BLOOD PRESSURE: 83 MMHG

## 2022-04-01 DIAGNOSIS — F50.81 BINGE EATING DISORDER: ICD-10-CM

## 2022-04-01 DIAGNOSIS — F41.9 ANXIETY: Primary | ICD-10-CM

## 2022-04-01 PROCEDURE — 99213 OFFICE O/P EST LOW 20 MIN: CPT | Performed by: INTERNAL MEDICINE

## 2022-04-01 RX ORDER — BUPROPION HYDROCHLORIDE 150 MG/1
TABLET, EXTENDED RELEASE ORAL
Qty: 270 TABLET | Refills: 1 | Status: SHIPPED | OUTPATIENT
Start: 2022-04-01 | End: 2022-04-12 | Stop reason: ALTCHOICE

## 2022-04-01 NOTE — PROGRESS NOTES
"Chief Complaint  Anxiety (2 month follow-up) and Binge Eating (2 month follow-up)    Subjective          Purvi Foreman presents to [facility]  History of Present Illness  Binge eating - patient reports waking up to snack more than ever. Patient reports vyvanse worked initially, but is not any longer.   Anxiety- patient reports increase stressors recently. Patient reports work is asking a lot of her recently. She is having difficulty  work and home life.     Current Outpatient Medications   Medication Instructions   • buPROPion SR (Wellbutrin SR) 150 MG 12 hr tablet Take 2 tablets by mouth Every Morning AND 1 tablet Every Evening.   • escitalopram (LEXAPRO) 5 mg, Oral, Daily   • lisdexamfetamine (VYVANSE) 70 mg, Oral, Every Morning   • loratadine (CLARITIN) 10 MG tablet Claritin 10 mg oral tablet take 1 tablet (10 mg) by oral route once daily   Active       The following portions of the patient's history were reviewed and updated as appropriate: allergies, current medications, past family history, past medical history, past social history, past surgical history, and problem list.    Objective   Vital Signs:   /83 (BP Location: Right arm, Patient Position: Sitting, Cuff Size: Large Adult)   Pulse 98   Temp 97.3 °F (36.3 °C) (Temporal)   Ht 180.3 cm (71\")   Wt 130 kg (286 lb)   SpO2 98%   BMI 39.89 kg/m²     Wt Readings from Last 3 Encounters:   04/01/22 130 kg (286 lb)   02/10/22 131 kg (288 lb 12.8 oz)   01/04/22 131 kg (289 lb 2 oz)     BP Readings from Last 3 Encounters:   04/01/22 120/83   02/10/22 122/86   01/04/22 115/74     Physical Exam   Appearance: No acute distress, well-nourished  Head: normocephalic, atraumatic  Eyes: extraocular movements intact, no scleral icterus, no conjunctival injection  Ears, Nose, and Throat: external ears normal, nares patent, moist mucous membranes  Cardiovascular: regular rate and rhythm. no murmurs, " rubs, or gallops. no edema  Respiratory: breathing comfortably, symmetric chest rise, clear to auscultation bilaterally. No wheezes, rales, or rhonchi.  Neuro: alert and oriented to time, place, and person. Normal gait  Psych: normal mood and affect     Result Review :   The following data was reviewed by: Kendrick Thompson Jr, MD on 04/01/2022:  Common labs    Common Labsle 12/2/21 12/2/21 12/2/21 12/2/21    1409 1409 1409 1409   Glucose    92   BUN    14   Creatinine    0.97   eGFR Non African Am    69   Sodium    135 (A)   Potassium    4.2   Chloride    104   Calcium    9.2   Albumin    4.20   Total Bilirubin    0.4   Alkaline Phosphatase    97   AST (SGOT)    13   ALT (SGPT)    7   WBC 7.51      Hemoglobin 12.4      Hematocrit 38.3      Platelets 263      Total Cholesterol   133    Triglycerides   160 (A)    HDL Cholesterol   34 (A)    LDL Cholesterol    71    Hemoglobin A1C  5.14     (A) Abnormal value              Lab Results   Component Value Date    COVID19 NOT DETECTED 02/12/2021    INR 0.95 (L) 01/16/2019          Assessment and Plan    Diagnoses and all orders for this visit:    1. Anxiety (Primary)  Comments:  inc wellbutrin to 450mg daily to help with anxiety and appetite suppression. cont lexapro 5mg. f/u in 1month for repeat eval.   Orders:  -     buPROPion SR (Wellbutrin SR) 150 MG 12 hr tablet; Take 2 tablets by mouth Every Morning AND 1 tablet Every Evening.  Dispense: 270 tablet; Refill: 1    2. Binge eating disorder  Comments:  cont vyvanse at current dose. william reviewed.   Orders:  -     lisdexamfetamine (Vyvanse) 70 MG capsule; Take 1 capsule by mouth Every Morning  Dispense: 30 capsule; Refill: 0        Medications Discontinued During This Encounter   Medication Reason   • escitalopram (LEXAPRO) 10 MG tablet    • buPROPion SR (Wellbutrin SR) 150 MG 12 hr tablet Reorder   • lisdexamfetamine (Vyvanse) 70 MG capsule Reorder          Follow Up   Return in about 4 weeks (around 4/29/2022)  for Recheck.  Patient was given instructions and counseling regarding her condition or for health maintenance advice. Please see specific information pulled into the AVS if appropriate.       Kendrick Thompson Jr, MD  04/01/22  09:26 EDT

## 2022-04-04 ENCOUNTER — TELEPHONE (OUTPATIENT)
Dept: INTERNAL MEDICINE | Facility: CLINIC | Age: 27
End: 2022-04-04

## 2022-04-04 DIAGNOSIS — F41.9 ANXIETY: ICD-10-CM

## 2022-04-04 NOTE — TELEPHONE ENCOUNTER
PA STARTED: 4/4/2022    COVER MY MEDS KEY: [id number]    WAITING ON INSURANCE REPLY    MEDICATION IS BUPROPION

## 2022-04-07 NOTE — TELEPHONE ENCOUNTER
Just to be cleared, is the prior auth approved, or is still being processed?  If I'm understanding Dr. Thompson's note correctly, it looks like she was already on wellbutrin, and he simply increased the dosage.

## 2022-04-07 NOTE — TELEPHONE ENCOUNTER
PT(PATIENT) VERIFIED     PT(PATIENT) STATES PHARMACY STATES MEDICATION MAY NOT BE COVERED    PT(PATIENT) STATES IT WOULD BE $45/MONTH COPAY AND THAT IS TOO MUCH FOR HER    PT(PATIENT) IS REQUESTING AN ALTERNATIVE    PROVIDER PLEASE ADVISE

## 2022-04-08 NOTE — TELEPHONE ENCOUNTER
Contacted [facility] to try to get PA started due to Cover My Meds stating that the patient was not found.    Informed me that there is still 5 hours that are left for the turn around time.     Was informed we should hear something back around end of today or Monday.

## 2022-04-11 NOTE — TELEPHONE ENCOUNTER
Denied on April 9    PA Case: [id number]  Status: Denied.   Notification: Completed.    States it exceeds the daily dose of 2 tablets

## 2022-04-12 RX ORDER — BUPROPION HYDROCHLORIDE 150 MG/1
150 TABLET ORAL EVERY EVENING
Qty: 90 TABLET | Refills: 1 | Status: SHIPPED | OUTPATIENT
Start: 2022-04-12 | End: 2022-04-26 | Stop reason: ALTCHOICE

## 2022-04-12 RX ORDER — BUPROPION HYDROCHLORIDE 300 MG/1
300 TABLET ORAL EVERY MORNING
Qty: 90 TABLET | Refills: 1 | Status: SHIPPED | OUTPATIENT
Start: 2022-04-12 | End: 2022-04-26 | Stop reason: ALTCHOICE

## 2022-04-12 NOTE — TELEPHONE ENCOUNTER
Max dose is 450mg. A way around this would be to send in a 300mg tablet along with a 150mg tablet and take each once daily for a total of 450mg. I will send new Prescription.

## 2022-04-21 ENCOUNTER — PATIENT MESSAGE (OUTPATIENT)
Dept: INTERNAL MEDICINE | Facility: CLINIC | Age: 27
End: 2022-04-21

## 2022-04-21 DIAGNOSIS — F50.81 BINGE EATING DISORDER: ICD-10-CM

## 2022-04-21 RX ORDER — PROMETHAZINE HYDROCHLORIDE 25 MG/1
25 TABLET ORAL EVERY 6 HOURS PRN
Qty: 60 TABLET | Refills: 0 | Status: SHIPPED | OUTPATIENT
Start: 2022-04-21 | End: 2022-08-08

## 2022-04-21 NOTE — TELEPHONE ENCOUNTER
MEDICATION: lisdexamfetamine (Vyvanse) 70 MG capsule (04/01/2022)    MEDICATION WAS JUST REFILLED 4/1/2022    PROVIDER PLEASE ADVISE

## 2022-04-21 NOTE — TELEPHONE ENCOUNTER
Cooper Ever   MRN: [id number]    Department:  [facility] Emergency Department   Date of Visit:  2/15/2019           Disclosure     Insurance plans vary and the physician(s) referred by the ER may not be covered by your plan.  Please cont From: Purvi Foreman  To: Kendrick Thompson Jr, MD  Sent: 4/21/2022 2:26 PM EDT  Subject: Nauseated/fatigued/headaches    Good afternoon.  I’ve been feeling extremely nauseated, fatigued and today makes the third day of a headache. It’s been going on for about the last week or so to the point I asked my OB to send me a prescription of zofran to my pharmacy. I took my last 5mg of lexapro last week and am only taking Wellbutrin 2 pills in the am and one at bedtime. Could these symptoms be from coming off lexapro and being on 450mg of Wellbutrin? My moods seem to be okay and I’m dealing with things a little better than I have been previously the last few months. Not sure if this would require an appointment or not. The zofran I’m on is 4mg every 8 hours as needed. I’m still nauseous multiple times throughout the day with taking the zofran every 8 hours. I’m also out of vyvanse and not sure if that could also be an issue for the symptoms I’ve been having about the last week.     Thanks!    CARE PHYSICIAN AT ONCE OR RETURN IMMEDIATELY TO THE EMERGENCY DEPARTMENT. If you have been prescribed any medication(s), please fill your prescription right away and begin taking the medication(s) as directed.   If you believe that any of the medications

## 2022-04-26 RX ORDER — ESCITALOPRAM OXALATE 20 MG/1
20 TABLET ORAL DAILY
Qty: 90 TABLET | Refills: 1 | Status: SHIPPED | OUTPATIENT
Start: 2022-04-26 | End: 2022-08-08

## 2022-05-26 ENCOUNTER — OFFICE VISIT (OUTPATIENT)
Dept: INTERNAL MEDICINE | Facility: CLINIC | Age: 27
End: 2022-05-26

## 2022-05-26 VITALS
HEART RATE: 112 BPM | DIASTOLIC BLOOD PRESSURE: 86 MMHG | HEIGHT: 71 IN | SYSTOLIC BLOOD PRESSURE: 124 MMHG | TEMPERATURE: 97.3 F | OXYGEN SATURATION: 98 % | WEIGHT: 287.6 LBS | BODY MASS INDEX: 40.26 KG/M2

## 2022-05-26 DIAGNOSIS — F41.9 ANXIETY: Primary | ICD-10-CM

## 2022-05-26 DIAGNOSIS — L01.03 BULLOUS IMPETIGO: ICD-10-CM

## 2022-05-26 DIAGNOSIS — F50.81 BINGE EATING DISORDER: ICD-10-CM

## 2022-05-26 LAB
AMPHET+METHAMPHET UR QL: POSITIVE
AMPHETAMINE INTERNAL CONTROL: ABNORMAL
AMPHETAMINES UR QL: NEGATIVE
BARBITURATE INTERNAL CONTROL: ABNORMAL
BARBITURATES UR QL SCN: NEGATIVE
BENZODIAZ UR QL SCN: NEGATIVE
BENZODIAZEPINE INTERNAL CONTROL: ABNORMAL
BUPRENORPHINE INTERNAL CONTROL: ABNORMAL
BUPRENORPHINE SERPL-MCNC: NEGATIVE NG/ML
CANNABINOIDS SERPL QL: NEGATIVE
COCAINE INTERNAL CONTROL: ABNORMAL
COCAINE UR QL: NEGATIVE
EXPIRATION DATE: ABNORMAL
Lab: ABNORMAL
MDMA (ECSTASY) INTERNAL CONTROL: ABNORMAL
MDMA UR QL SCN: NEGATIVE
METHADONE INTERNAL CONTROL: ABNORMAL
METHADONE UR QL SCN: NEGATIVE
METHAMPHETAMINE INTERNAL CONTROL: ABNORMAL
OPIATES INTERNAL CONTROL: ABNORMAL
OPIATES UR QL: NEGATIVE
OXYCODONE INTERNAL CONTROL: ABNORMAL
OXYCODONE UR QL SCN: NEGATIVE
PCP UR QL SCN: NEGATIVE
PHENCYCLIDINE INTERNAL CONTROL: ABNORMAL
THC INTERNAL CONTROL: ABNORMAL

## 2022-05-26 PROCEDURE — 80305 DRUG TEST PRSMV DIR OPT OBS: CPT | Performed by: INTERNAL MEDICINE

## 2022-05-26 PROCEDURE — 99214 OFFICE O/P EST MOD 30 MIN: CPT | Performed by: INTERNAL MEDICINE

## 2022-05-26 RX ORDER — CLINDAMYCIN PHOSPHATE 11.9 MG/ML
SOLUTION TOPICAL 2 TIMES DAILY
Qty: 60 ML | Refills: 0 | Status: SHIPPED | OUTPATIENT
Start: 2022-05-26 | End: 2022-06-02

## 2022-05-26 RX ORDER — BUSPIRONE HYDROCHLORIDE 10 MG/1
10 TABLET ORAL 3 TIMES DAILY PRN
Qty: 60 TABLET | Refills: 0 | Status: SHIPPED | OUTPATIENT
Start: 2022-05-26 | End: 2022-08-08

## 2022-05-26 NOTE — PROGRESS NOTES
"Chief Complaint  Anxiety and binge eating disorder    Subjective          Purvi Foreman presents to [facility]  History of Present Illness  Anxiety - patient is back on lexapro 20mg. Patient reports anxiety is still present. Patient associates anxiety with work. Patient reports working a lot of hours recently. Patient reports some recent deaths in the family. Patient denies HI and SI.   Binge eating disorder- patient reports eating better. Patient reports vyvanse does seem to be helping.   Patient also c/o sores on left foot as well as cracking in her heal.     Current Outpatient Medications   Medication Instructions   • busPIRone (BUSPAR) 10 mg, Oral, 3 Times Daily PRN   • clindamycin (Cleocin-T) 1 % external solution Topical, 2 Times Daily   • escitalopram (LEXAPRO) 20 mg, Oral, Daily   • lisdexamfetamine (VYVANSE) 70 mg, Oral, Every Morning   • loratadine (CLARITIN) 10 MG tablet Claritin 10 mg oral tablet take 1 tablet (10 mg) by oral route once daily   Active   • promethazine (PHENERGAN) 25 mg, Oral, Every 6 Hours PRN       The following portions of the patient's history were reviewed and updated as appropriate: allergies, current medications, past family history, past medical history, past social history, past surgical history, and problem list.    Objective   Vital Signs:   /86 (BP Location: Left arm, Patient Position: Sitting, Cuff Size: Large Adult)   Pulse 112   Temp 97.3 °F (36.3 °C) (Temporal)   Ht 180.3 cm (71\")   Wt 130 kg (287 lb 9.6 oz)   SpO2 98%   BMI 40.11 kg/m²     Wt Readings from Last 3 Encounters:   05/26/22 130 kg (287 lb 9.6 oz)   04/01/22 130 kg (286 lb)   02/10/22 131 kg (288 lb 12.8 oz)     BP Readings from Last 3 Encounters:   05/26/22 124/86   04/01/22 120/83   02/10/22 122/86     Physical Exam   Appearance: No acute distress, well-nourished  Head: normocephalic, atraumatic  Eyes: extraocular movements intact, no scleral " icterus, no conjunctival injection  Ears, Nose, and Throat: external ears normal, nares patent, moist mucous membranes  Cardiovascular: regular rate and rhythm. no murmurs, rubs, or gallops. no edema  Respiratory: breathing comfortably, symmetric chest rise, clear to auscultation bilaterally. No wheezes, rales, or rhonchi.  Neuro: alert and oriented to time, place, and person. Normal gait  Psych: normal mood and affect   Left foot: cluster of small vesicles with erythematous base approx 6-7 on medial side near heel.    Result Review :   The following data was reviewed by: Kendrick Thompson Jr, MD on 05/26/2022:  Common labs    Common Labsle 12/2/21 12/2/21 12/2/21 12/2/21    1409 1409 1409 1409   Glucose    92   BUN    14   Creatinine    0.97   eGFR Non African Am    69   Sodium    135 (A)   Potassium    4.2   Chloride    104   Calcium    9.2   Albumin    4.20   Total Bilirubin    0.4   Alkaline Phosphatase    97   AST (SGOT)    13   ALT (SGPT)    7   WBC 7.51      Hemoglobin 12.4      Hematocrit 38.3      Platelets 263      Total Cholesterol   133    Triglycerides   160 (A)    HDL Cholesterol   34 (A)    LDL Cholesterol    71    Hemoglobin A1C  5.14     (A) Abnormal value              Lab Results   Component Value Date    COVID19 NOT DETECTED 02/12/2021    INR 0.95 (L) 01/16/2019     Last Urine Toxicity     LAST URINE TOXICITY RESULTS Latest Ref Rng & Units 5/26/2022    AMPHETAMINES SCREEN, URINE Negative Positive(A)    BARBITURATES SCREEN Negative Negative    BENZODIAZEPINE SCREEN, URINE Negative Negative    BUPRENORPHINEUR Negative Negative    COCAINE SCREEN, URINE Negative Negative    METHADONE SCREEN, URINE Negative Negative    METHAMPHETAMINEUR Negative Negative          Assessment and Plan    Diagnoses and all orders for this visit:    1. Anxiety (Primary)  Comments:  doing better off wellbutrin. cont lexapro at current dosage. add buspar to use when needed.   Orders:  -     busPIRone (BUSPAR) 10 MG  tablet; Take 1 tablet by mouth 3 (Three) Times a Day As Needed (anxiety).  Dispense: 60 tablet; Refill: 0  -     POC Urine Drug Screen [facility]    2. Binge eating disorder  Comments:  doing better on vyvanse and will cont. UDS and william reviewed.     3. Bullous impetigo  -     clindamycin (Cleocin-T) 1 % external solution; Apply  topically to the appropriate area as directed 2 (Two) Times a Day for 7 days.  Dispense: 60 mL; Refill: 0          There are no discontinued medications.       Follow Up   Return in about 3 months (around 8/26/2022) for Recheck.  Patient was given instructions and counseling regarding her condition or for health maintenance advice. Please see specific information pulled into the AVS if appropriate.       Kendrick Thompson Jr, MD  05/26/22  17:15 EDT

## 2022-06-20 ENCOUNTER — TELEPHONE (OUTPATIENT)
Dept: OBSTETRICS AND GYNECOLOGY | Facility: CLINIC | Age: 27
End: 2022-06-20

## 2022-06-20 ENCOUNTER — PATIENT MESSAGE (OUTPATIENT)
Dept: INTERNAL MEDICINE | Facility: CLINIC | Age: 27
End: 2022-06-20

## 2022-06-20 DIAGNOSIS — M79.672 HEEL PAIN, BILATERAL: Primary | ICD-10-CM

## 2022-06-20 DIAGNOSIS — M79.671 HEEL PAIN, BILATERAL: Primary | ICD-10-CM

## 2022-06-20 NOTE — TELEPHONE ENCOUNTER
From: Purvi Foreman  To: Kendrick Thompson Jr, MD  Sent: 6/20/2022 10:55 AM EDT  Subject: Possible X-ray?    I mentioned bilateral heel pain at my last office visit. My appointment with the podiatrist is not for another 2 weeks. My heel pain is gradually getting more painful and hurts to even put weight on them. My right is much worse than my left. I’m concerned for a possible heel spur? Would it be possible to order an X-ray prior to seeing the podiatrist or would you recommend just sticking it out and waiting till I see ?    Thank you!

## 2022-06-21 ENCOUNTER — HOSPITAL ENCOUNTER (OUTPATIENT)
Dept: GENERAL RADIOLOGY | Facility: HOSPITAL | Age: 27
Discharge: HOME | End: 2022-06-21

## 2022-06-21 DIAGNOSIS — M79.672 HEEL PAIN, BILATERAL: ICD-10-CM

## 2022-06-21 DIAGNOSIS — M79.671 HEEL PAIN, BILATERAL: ICD-10-CM

## 2022-06-21 PROCEDURE — 73630 X-RAY EXAM OF FOOT: CPT

## 2022-07-06 ENCOUNTER — OFFICE VISIT (OUTPATIENT)
Dept: PODIATRY | Facility: CLINIC | Age: 27
End: 2022-07-06

## 2022-07-06 ENCOUNTER — PATIENT ROUNDING (BHMG ONLY) (OUTPATIENT)
Dept: PODIATRY | Facility: CLINIC | Age: 27
End: 2022-07-06

## 2022-07-06 VITALS
TEMPERATURE: 96.6 F | HEART RATE: 93 BPM | DIASTOLIC BLOOD PRESSURE: 63 MMHG | HEIGHT: 71 IN | WEIGHT: 293 LBS | OXYGEN SATURATION: 100 % | SYSTOLIC BLOOD PRESSURE: 119 MMHG | BODY MASS INDEX: 41.02 KG/M2

## 2022-07-06 DIAGNOSIS — M79.672 FOOT PAIN, BILATERAL: Primary | ICD-10-CM

## 2022-07-06 DIAGNOSIS — M79.671 FOOT PAIN, BILATERAL: Primary | ICD-10-CM

## 2022-07-06 DIAGNOSIS — M72.2 PLANTAR FASCIITIS: ICD-10-CM

## 2022-07-06 PROCEDURE — 20550 NJX 1 TENDON SHEATH/LIGAMENT: CPT | Performed by: PODIATRIST

## 2022-07-06 PROCEDURE — 99203 OFFICE O/P NEW LOW 30 MIN: CPT | Performed by: PODIATRIST

## 2022-07-06 RX ORDER — TRIAMCINOLONE ACETONIDE 40 MG/ML
20 INJECTION, SUSPENSION INTRA-ARTICULAR; INTRAMUSCULAR ONCE
Status: COMPLETED | OUTPATIENT
Start: 2022-07-06 | End: 2022-07-06

## 2022-07-06 RX ORDER — DEXAMETHASONE SODIUM PHOSPHATE 4 MG/ML
2 INJECTION, SOLUTION INTRA-ARTICULAR; INTRALESIONAL; INTRAMUSCULAR; INTRAVENOUS; SOFT TISSUE ONCE
Status: COMPLETED | OUTPATIENT
Start: 2022-07-06 | End: 2022-07-06

## 2022-07-06 RX ORDER — LIDOCAINE HYDROCHLORIDE 10 MG/ML
0.5 INJECTION, SOLUTION INFILTRATION; PERINEURAL ONCE
Status: COMPLETED | OUTPATIENT
Start: 2022-07-06 | End: 2022-07-06

## 2022-07-06 RX ORDER — DICLOFENAC SODIUM 75 MG/1
75 TABLET, DELAYED RELEASE ORAL 2 TIMES DAILY
Qty: 60 TABLET | Refills: 1 | Status: SHIPPED | OUTPATIENT
Start: 2022-07-06 | End: 2022-08-05

## 2022-07-06 RX ADMIN — TRIAMCINOLONE ACETONIDE 20 MG: 40 INJECTION, SUSPENSION INTRA-ARTICULAR; INTRAMUSCULAR at 08:46

## 2022-07-06 RX ADMIN — LIDOCAINE HYDROCHLORIDE 0.5 ML: 10 INJECTION, SOLUTION INFILTRATION; PERINEURAL at 08:47

## 2022-07-06 RX ADMIN — DEXAMETHASONE SODIUM PHOSPHATE 2 MG: 4 INJECTION, SOLUTION INTRA-ARTICULAR; INTRALESIONAL; INTRAMUSCULAR; INTRAVENOUS; SOFT TISSUE at 08:45

## 2022-07-06 NOTE — PROGRESS NOTES
A [employer] message has been sent to the patient for PATIENT ROUNDING with [facility]

## 2022-07-06 NOTE — PROGRESS NOTES
[facility] - PODIATRY    Today's Date: 07/06/22    Patient Name: Purvi Foreman  MRN: [id number]  CSN: [id number]  PCP: Kendrick Thompson Jr., MD  Referring Provider: Referring, Self    SUBJECTIVE     Chief Complaint   Patient presents with   • Left Foot - Pain, Establish Care   • Right Foot - Establish Care, Pain     HPI: Purvi Foreman, a 27 y.o.female, comes to clinic.    New, Established, New Problem: New    Location: Bilateral heels    Duration: 2018    Onset:  gradual    Nature:  achy, sharp, shooting    Stable, worsening, improving: Worsening    Aggravating factors:  Patient describes morning bilateral heel pain as stabbing, burning, or aching. This pain usually subsides throughout the day, however it returns after periods of rest and sitting, when standing back up on their feet, and again the next morning.      Previous Treatment: Change in shoe, x-rays    Patient denies any fevers, chills, nausea, vomiting, shortness of breath, nor any other constitutional signs nor symptoms.    No other pedal complaints at this time.    Past Medical History:   Diagnosis Date   • Allergic    • Foot pain, bilateral    • Pap smear for cervical cancer screening 04/2019     Past Surgical History:   Procedure Laterality Date   • APPENDECTOMY     • RETAINED PLACENTA REMOVAL       Family History   Problem Relation Age of Onset   • Hypertension Mother    • Hypertension Father    • Diabetes Father    • Cancer Maternal Grandfather    • Cancer Paternal Grandmother    • Stroke Neg Hx      Social History     Socioeconomic History   • Marital status:    Tobacco Use   • Smoking status: Never Smoker   • Smokeless tobacco: Never Used   Vaping Use   • Vaping Use: Never used   Substance and Sexual Activity   • Alcohol use: Yes     Comment: drinks rarely   • Drug use: Never   • Sexual activity: Defer     Allergies   Allergen Reactions   • Latex Unknown - High Severity   • Penicillins Unknown - High  Severity     Current Outpatient Medications   Medication Sig Dispense Refill   • busPIRone (BUSPAR) 10 MG tablet Take 1 tablet by mouth 3 (Three) Times a Day As Needed (anxiety). 60 tablet 0   • escitalopram (Lexapro) 20 MG tablet Take 1 tablet by mouth Daily. 90 tablet 1   • lisdexamfetamine (Vyvanse) 70 MG capsule Take 1 capsule by mouth Every Morning 30 capsule 0   • loratadine (CLARITIN) 10 MG tablet Claritin 10 mg oral tablet take 1 tablet (10 mg) by oral route once daily   Active     • promethazine (PHENERGAN) 25 MG tablet Take 1 tablet by mouth Every 6 (Six) Hours As Needed for Nausea or Vomiting. 60 tablet 0   • diclofenac (VOLTAREN) 75 MG EC tablet Take 1 tablet by mouth 2 (Two) Times a Day for 30 days. 60 tablet 1     Current Facility-Administered Medications   Medication Dose Route Frequency Provider Last Rate Last Admin   • dexamethasone sodium phosphate injection 2 mg  2 mg Intramuscular Once Rigoberto Thomas DPM       • dexamethasone sodium phosphate injection 2 mg  2 mg Intramuscular Once Rigoberto Thomas DPM       • lidocaine (XYLOCAINE) 1 % injection 0.5 mL  0.5 mL Infiltration Once Rigoberto Thomas DPM       • lidocaine (XYLOCAINE) 1 % injection 0.5 mL  0.5 mL Infiltration Once Rigoberto Thomas DPM       • triamcinolone acetonide (KENALOG-40) injection 20 mg  20 mg Intramuscular Once Rigoberto Thomas DPM       • triamcinolone acetonide (KENALOG-40) injection 20 mg  20 mg Intramuscular Once Rigoberto Thomas DPM         Review of Systems   Constitutional: Negative.    Musculoskeletal:        Bilateral heel pain   All other systems reviewed and are negative.      OBJECTIVE     Vitals:    07/06/22 0804   BP: 119/63   Pulse: 93   Temp: 96.6 °F (35.9 °C)   SpO2: 100%       PHYSICAL EXAM     Foot/Ankle Exam:       General:   Appearance comment:  Morbidly obese  Orientation: AAOx3    Affect: appropriate    Gait: unimpaired    Shoe Gear:  Casual shoes    VASCULAR       Right Foot Vascularity   Normal vascular exam    Dorsalis pedis:  2+  Posterior tibial:  2+  Skin Temperature: warm    Edema Grading:  None  CFT:  < 3 seconds  Pedal Hair Growth:  Present  Varicosities: none       Left Foot Vascularity   Normal vascular exam    Dorsalis pedis:  2+  Posterior tibial:  2+  Skin Temperature: warm    Edema Grading:  None  CFT:  < 3 seconds  Pedal Hair Growth:  Present  Varicosities: none        NEUROLOGIC     Right Foot Neurologic   Normal sensation    Light touch sensation:  Normal  Vibratory sensation:  Normal  Hot/Cold sensation: normal       Left Foot Neurologic   Normal sensation    Light touch sensation:  Normal  Vibratory sensation:  Normal  Hot/cold sensation: normal       MUSCULOSKELETAL      Right Foot Musculoskeletal   Tenderness: plantar fascia and plantar heel       Left Foot Musculoskeletal   Tenderness: plantar fascia and plantar heel       MUSCLE STRENGTH     Right Foot Muscle Strength   Foot dorsiflexion:  4  Foot plantar flexion:  4  Foot inversion:  4  Foot eversion:  4     Left Foot Muscle Strength   Foot dorsiflexion:  4  Foot plantar flexion:  4  Foot inversion:  4  Foot eversion:  4     RANGE OF MOTION      Right Foot Range of Motion   Foot and ankle ROM within normal limits       Left Foot Range of Motion   Foot and ankle ROM within normal limits       DERMATOLOGIC     Right Foot Dermatologic   Skin: skin intact    Nails: normal       Left Foot Dermatologic   Skin: skin intact    Nails: normal        RADIOLOGY:    XR Foot 3+ View Left    Result Date: 6/21/2022  Narrative: PROCEDURE: XR FOOT 3+ VW LEFT  COMPARISON: None  INDICATIONS: POSTERIOR HEEL PAIN RADIATES DOWN TO BOTTOM FOOT  X3 YEARS WORSEN OVER TIME  FINDINGS:  BONES: Normal.  No significant arthropathy or acute abnormality.  SOFT TISSUES: Negative.  No visible soft tissue swelling.  EFFUSION: None visible.  OTHER: Negative.       Impression:  No acute disease.       RASHAD OLIVEROS MD        Electronically Signed and Approved By: RASHAD OLIVEROS MD on 6/21/2022 at 13:02             XR Foot 3+ View Right    Result Date: 6/21/2022  Narrative: PROCEDURE: XR FOOT 3+ VW RIGHT  COMPARISON: None  INDICATIONS: POSTERIOR HEEL PAIN RADIATES DOWN TO BOTTOM FOOT  X3 YEARS WORSEN OVER TIME  FINDINGS:  BONES: Normal.  No significant arthropathy or acute abnormality.  SOFT TISSUES: Negative.  No visible soft tissue swelling.  EFFUSION: None visible.       Impression:  No acute disease.    RASHAD OLIVEROS MD       Electronically Signed and Approved By: RASHAD OLIVEROS MD on 6/21/2022 at 13:02                ASSESSMENT/PLAN     Diagnoses and all orders for this visit:    1. Foot pain, bilateral (Primary)    2. Plantar fasciitis  -     dexamethasone sodium phosphate injection 2 mg  -     lidocaine (XYLOCAINE) 1 % injection 0.5 mL  -     triamcinolone acetonide (KENALOG-40) injection 20 mg  -     dexamethasone sodium phosphate injection 2 mg  -     lidocaine (XYLOCAINE) 1 % injection 0.5 mL  -     triamcinolone acetonide (KENALOG-40) injection 20 mg  -     Ambulatory Referral For Orthotics  -     diclofenac (VOLTAREN) 75 MG EC tablet; Take 1 tablet by mouth 2 (Two) Times a Day for 30 days.  Dispense: 60 tablet; Refill: 1    Prescriptions written for custom-made orthotics.    Comprehensive lower extremity examination and evaluation was performed.    Discussed findings and treatment plan including risks, benefits, and treatment options with patient in detail. Patient agreed with treatment plan.    Plantar Fasciits Injection:    Date/Time: 07/06/2022  Performed by: Rigoberto Thomas DPM  Authorized by: Rigoberto Thomas DPM     Consent: Verbal consent obtained. Written consent obtained.  Risks and benefits: risks, benefits and alternatives were discussed  Consent given by: patient  Patient identity confirmed: verbally with patient  Indications: pain relief    Injection site: Bilateral heel.    Sedation:  none    Patient position:  "sitting  Needle size: 27 G, 1 1/4\" in length  Injection medications:  0.5 ml 1% Lidocaine plain, 0.5 ml Kenalog 40 mg/ml, and 0.5 ml Decadron 4 mg/mL.   Outcome: pain improved    Patient tolerated the procedure well with no immediate complications.    Treatment Options discussed:  - no treatment at all  - change in shoegear  - change in activities  - RICE therapy  - arch support  - NSAIDs  - PO steroids  - injectable steroids    Patient may begin to weight bear as tolerated in supportive shoes.  No impact activities for two weeks.  After that time, the patient may increase activities as tolerated. Patient states understanding and agreement with this plan.    An After Visit Summary was printed and given to the patient at discharge, including (if requested) any available informative/educational handouts regarding diagnosis, treatment, or medications. All questions were answered to patient/family satisfaction. Should symptoms fail to improve or worsen they agree to call or return to clinic or to go to the Emergency Department. Discussed the importance of following up with any needed screening tests/labs/specialist appointments and any requested follow-up recommended by me today. Importance of maintaining follow-up discussed and patient accepts that missed appointments can delay diagnosis and potentially lead to worsening of conditions.    Return in about 2 weeks (around 7/20/2022) for Rx f/u., or sooner if acute issues arise.    This document has been electronically signed by Rigoberto Thomas DPM on July 6, 2022 08:42 EDT       "

## 2022-07-07 NOTE — PROGRESS NOTES
"Well Woman Visit    CC: Scheduled annual well gyn visit  Chief Complaint   Patient presents with   • Gynecologic Exam       [facility] intake in the past?: Yes    DATE PERFORMED:  Result: Green Changes in Family Hx since? NO    Social History     Substance and Sexual Activity   Sexual Activity Yes   • Partners: Male   • Birth control/protection: I.U.D.    Comment: Mirena       HPI:   27 y.o.     Menses:   None, IUD  Pain:  None    PCP: does manage PMHx and preventative labs  History: PMHx, Meds, Allergies, PSHx, Social Hx, and POBHx all reviewed and updated.    Pt has no complaints today. Plans preg later this year.  Wants to keep IUD for now.     PHYSICAL EXAM:  /78   Ht 182.9 cm (72\")   Wt 129 kg (285 lb)   BMI 38.65 kg/m²  Not found.  General- NAD, alert and oriented, appropriate  Psych- Normal mood, good memory  Neck- No masses, no thyroid enlargement  CV- Regular rhythm, no murnurs  Resp- CTA to bases, no wheezes  Abdomen- Soft, non distended, non tender, no masses    Breast left-  Bilaterally symmetrical, no masses, non tender, no nipple discharge  Breast right- Bilaterally symmetrical, no masses, non tender, no nipple discharge    External genitalia- Normal female, no lesions  Urethra/meatus- Normal, no masses, non tender  Bladder- Normal, no masses, non tender  Vagina- Normal, no atrophy, no lesions, no discharge.  Prolapse: No prolapse  Cvx- Normal, no lesions, no discharge, No cervical motion tenderness.  IUD strings 2cm.  Uterus- Normal size, shape & consistency.  Non tender, mobile, & no prolapse  Adnexa- No mass, non tender  Anus/Rectum/Perineum- Not performed    Lymphatic- No palpable neck, axillary, or groin nodes  Ext- No edema, no cyanosis    Skin- No lesions, no rashes, no acanthosis nigricans      ASSESSMENT and PLAN:    Diagnoses and all orders for this visit:    1. Well woman exam (Primary)  -     IGP,rfx Aptima HPV All Pth    2. IUD check up    3. Desire for " pregnancy  Comments:  Wean Lexapro and other psych meds PRN via Dr. Sudhir Castelan, Lexapro, Vyvanse  Start PNV  Wt loss, plan BMI < 35 prior to preg    4. Anxiety    5. Obesity (BMI 30-39.9)    Other orders  -     Prenatal Vit-Fe Sulfate-FA-DHA (Prenatal Vitamin/Min +DHA) 27-0.8-200 MG capsule; Take 1 each by mouth Daily.  Dispense: 90 capsule; Refill: 4        Preventative:  • BREAST HEALTH- Monthly self breast exam importance and how to reviewed. MMG and/or MRI (prn) reviewed per society guidelines and her individual history. Screen: Not medically needed  • CERVICAL CANCER Screening- Reviewed current ASCCP guidelines for screening w and wo cotest HPV, age specific.  Screen: Updated today  • COLON CANCER Screening- Reviewed current medical society guidelines and options.  Screen:  Not medically needed  • Importance of WEIGHT MANAGEMENT, nutrition, and exercise reviewed  • BONE HEALTH- Reviewed current medical society guidelines and options for testing, calcium and vit D intake.  Weight bearing exercise.  DEXA: Not medically needed  • VACCINATIONS Recommended: COVID and booster PRN, Flu annually, Gardisil/HPV vaccine (up to 44yo), Tdap p51mmnit.  Importance discussed, risk being unvaccinated reviewed.  Questions answered  • Smoking status- NON SMOKER  • Follow up PCP/Specialist PMHx and Labs      She understands the importance of having any ordered tests to be performed in a timely fashion.  The risks of not performing them include, but are not limited to, advanced cancer stages, bone loss from osteoporosis and/or subsequent increase in morbidity and/or mortality.  She is encouraged to review her results online and/or contact or office if she has questions.       Follow Up:  Return in about 3 months (around 10/11/2022) for IUD removal.            Gertrude Bower, DO  07/11/2022    [facility]  [facility] [place] [address]  Dept: [phone]  Dept Fax:  [phone]  Loc: [phone]  Loc Fax: [phone]

## 2022-07-11 ENCOUNTER — OFFICE VISIT (OUTPATIENT)
Dept: OBSTETRICS AND GYNECOLOGY | Facility: CLINIC | Age: 27
End: 2022-07-11

## 2022-07-11 VITALS
WEIGHT: 285 LBS | HEIGHT: 72 IN | BODY MASS INDEX: 38.6 KG/M2 | DIASTOLIC BLOOD PRESSURE: 78 MMHG | SYSTOLIC BLOOD PRESSURE: 124 MMHG

## 2022-07-11 DIAGNOSIS — E66.9 OBESITY (BMI 30-39.9): ICD-10-CM

## 2022-07-11 DIAGNOSIS — Z01.419 WELL WOMAN EXAM: Primary | ICD-10-CM

## 2022-07-11 DIAGNOSIS — Z31.9 DESIRE FOR PREGNANCY: ICD-10-CM

## 2022-07-11 DIAGNOSIS — Z30.431 IUD CHECK UP: ICD-10-CM

## 2022-07-11 DIAGNOSIS — F41.9 ANXIETY: ICD-10-CM

## 2022-07-11 PROCEDURE — 99395 PREV VISIT EST AGE 18-39: CPT | Performed by: OBSTETRICS & GYNECOLOGY

## 2022-07-11 PROCEDURE — G0123 SCREEN CERV/VAG THIN LAYER: HCPCS | Performed by: OBSTETRICS & GYNECOLOGY

## 2022-07-11 RX ORDER — LEVONORGESTREL 52 MG/1
1 INTRAUTERINE DEVICE INTRAUTERINE
COMMUNITY
End: 2022-10-26 | Stop reason: ALTCHOICE

## 2022-07-11 RX ORDER — CHOLECALCIFEROL (VITAMIN D3) 25 MCG
1 TABLET,CHEWABLE ORAL DAILY
Qty: 90 CAPSULE | Refills: 4 | Status: SHIPPED | OUTPATIENT
Start: 2022-07-11 | End: 2022-08-08

## 2022-07-13 LAB
CONV .: NORMAL
CYTOLOGIST CVX/VAG CYTO: NORMAL
CYTOLOGY CVX/VAG DOC CYTO: NORMAL
CYTOLOGY CVX/VAG DOC THIN PREP: NORMAL
DX ICD CODE: NORMAL
HIV 1 & 2 AB SER-IMP: NORMAL
OTHER STN SPEC: NORMAL
STAT OF ADQ CVX/VAG CYTO-IMP: NORMAL

## 2022-07-25 DIAGNOSIS — B37.31 YEAST VAGINITIS: Primary | ICD-10-CM

## 2022-07-25 DIAGNOSIS — N76.0 BV (BACTERIAL VAGINOSIS): ICD-10-CM

## 2022-07-25 DIAGNOSIS — B96.89 BV (BACTERIAL VAGINOSIS): ICD-10-CM

## 2022-07-25 RX ORDER — METRONIDAZOLE 500 MG/1
500 TABLET ORAL 3 TIMES DAILY
Qty: 21 TABLET | Refills: 0 | Status: SHIPPED | OUTPATIENT
Start: 2022-07-25 | End: 2022-08-01

## 2022-07-25 RX ORDER — FLUCONAZOLE 150 MG/1
150 TABLET ORAL ONCE
Qty: 1 TABLET | Refills: 3 | Status: SHIPPED | OUTPATIENT
Start: 2022-07-25 | End: 2022-07-25

## 2022-07-25 NOTE — PROGRESS NOTES
Pt c/o vaginal irritation and vaginal odor she believes may be BV.  Will treat empirically with flagyl.  If no improvement in 24 hours pt will need to have an exam

## 2022-08-08 PROCEDURE — U0004 COV-19 TEST NON-CDC HGH THRU: HCPCS | Performed by: EMERGENCY MEDICINE

## 2022-08-09 NOTE — PROGRESS NOTES
"Chief Complaint  Earache (Bilateral ), Nasal Congestion, Generalized Body Aches, Fever, and Sore Throat    Subjective          Purvi Foreman presents to [facility]  Sinusitis  This is a new problem. The current episode started in the past 7 days. The problem is unchanged. The maximum temperature recorded prior to her arrival was 100.4 - 100.9 F. The fever has been present for 1 to 2 days. Associated symptoms include congestion, coughing, headaches, sinus pressure and a sore throat. Pertinent negatives include no chills, diaphoresis, ear pain, hoarse voice, neck pain, shortness of breath, sneezing or swollen glands. Treatments tried: 2 coworkers covid +       Patient was seen at Urgent Care on [address]. Patient was told that since her PCR Covid-19 test was negative that she needed to follow-up with her PCP before returning to work      Current Outpatient Medications   Medication Instructions   • brompheniramine-pseudoephedrine-DM (Bromfed DM) 30-2-10 MG/5ML syrup 10 mL, Oral, 4 Times Daily PRN   • cefdinir (OMNICEF) 300 mg, Oral, 2 Times Daily   • Levonorgestrel (Mirena, 52 MG,) 20 MCG/DAY intrauterine device IUD 1 each, Intrauterine   • lisdexamfetamine (VYVANSE) 70 mg, Oral, Every Morning   • loratadine (CLARITIN) 10 MG tablet Claritin 10 mg oral tablet take 1 tablet (10 mg) by oral route once daily   Active       The following portions of the patient's history were reviewed and updated as appropriate: allergies, current medications, past family history, past medical history, past social history, past surgical history, and problem list.    Objective   Vital Signs:   /78 (BP Location: Left arm, Patient Position: Sitting, Cuff Size: Adult)   Pulse 93   Temp 98.1 °F (36.7 °C) (Temporal)   Ht 182.9 cm (72\")   Wt 133 kg (294 lb)   SpO2 98%   BMI 39.87 kg/m²     Wt Readings from Last 3 Encounters:   08/10/22 133 kg (294 lb)   07/11/22 129 kg (285 lb) "   07/06/22 135 kg (297 lb 3.2 oz)     BP Readings from Last 3 Encounters:   08/10/22 116/78   08/08/22 130/83   07/11/22 124/78     Physical Exam  Vitals and nursing note reviewed.   Constitutional:       General: She is not in acute distress.     Appearance: Normal appearance. She is not ill-appearing.   HENT:      Right Ear: Tympanic membrane, ear canal and external ear normal.      Left Ear: Tympanic membrane, ear canal and external ear normal.      Nose: Congestion and rhinorrhea present.      Right Sinus: Maxillary sinus tenderness and frontal sinus tenderness present.      Left Sinus: Maxillary sinus tenderness and frontal sinus tenderness present.   Eyes:      Extraocular Movements: Extraocular movements intact.      Conjunctiva/sclera: Conjunctivae normal.   Cardiovascular:      Rate and Rhythm: Normal rate.   Pulmonary:      Effort: Pulmonary effort is normal.      Breath sounds: Normal breath sounds.   Skin:     General: Skin is warm and dry.      Capillary Refill: Capillary refill takes less than 2 seconds.   Neurological:      General: No focal deficit present.      Mental Status: She is alert and oriented to person, place, and time. Mental status is at baseline.   Psychiatric:         Mood and Affect: Mood normal.         Behavior: Behavior normal.         Thought Content: Thought content normal.         Judgment: Judgment normal.              Result Review :   The following data was reviewed by: RHIANNA Quintanilla on 08/10/2022:  Common labs    Common Labsle 12/2/21 12/2/21 12/2/21 12/2/21    1409 1409 1409 1409   Glucose    92   BUN    14   Creatinine    0.97   eGFR Non African Am    69   Sodium    135 (A)   Potassium    4.2   Chloride    104   Calcium    9.2   Albumin    4.20   Total Bilirubin    0.4   Alkaline Phosphatase    97   AST (SGOT)    13   ALT (SGPT)    7   WBC 7.51      Hemoglobin 12.4      Hematocrit 38.3      Platelets 263      Total Cholesterol   133    Triglycerides   160 (A)     HDL Cholesterol   34 (A)    LDL Cholesterol    71    Hemoglobin A1C  5.14     (A) Abnormal value                   Lab Results   Component Value Date    SARSANTIGEN Not Detected 08/10/2022    COVID19 Not Detected 08/08/2022    RAPFLUA Negative 08/08/2022    RAPFLUB Negative 08/08/2022    FLUAAG Not Detected 08/10/2022    FLUBAG Not Detected 08/10/2022    RAPSCRN Negative 08/10/2022    INR 0.95 (L) 01/16/2019       Procedures        Assessment and Plan    Diagnoses and all orders for this visit:    1. Acute non-recurrent maxillary sinusitis (Primary)  -     brompheniramine-pseudoephedrine-DM (Bromfed DM) 30-2-10 MG/5ML syrup; Take 10 mL by mouth 4 (Four) Times a Day As Needed for Congestion, Cough or Allergies for up to 10 days.  Dispense: 400 mL; Refill: 0  -     cefdinir (OMNICEF) 300 MG capsule; Take 1 capsule by mouth 2 (Two) Times a Day for 10 days.  Dispense: 20 capsule; Refill: 0    2. Pharyngitis, unspecified etiology  -     POC Rapid Strep A  -     COVID-19,APTIMA PANTHER(DAMION),[facility] JAMIE/BH ALAYNA, NP/OP SWAB IN UTM/VTM/SALINE TRANSPORT MEDIA,24 HR TAT - Swab, Nasopharynx  -     Beta Strep Culture, Throat - Swab, Throat    3. Fever, unspecified fever cause  -     POCT SARS-CoV-2 Antigen NEEL + Flu  -     COVID-19,APTIMA PANTHER(DAMION),[facility] JAMIE/BH ALAYNA, NP/OP SWAB IN UTM/VTM/SALINE TRANSPORT MEDIA,24 HR TAT - Swab, Nasopharynx  -     Beta Strep Culture, Throat - Swab, Throat      - rest   - increase fluids   - tylenol/motrin prn         Medications Discontinued During This Encounter   Medication Reason   • benzonatate (TESSALON) 100 MG capsule Discontinued by another clinician          Follow Up   Return if symptoms worsen or fail to improve.  Patient was given instructions and counseling regarding her condition or for health maintenance advice. Please see specific information pulled into the AVS if appropriate.       Purvi Weinstein, RHIANNA  08/10/22  12:00 EDT

## 2022-08-10 ENCOUNTER — OFFICE VISIT (OUTPATIENT)
Dept: INTERNAL MEDICINE | Facility: CLINIC | Age: 27
End: 2022-08-10

## 2022-08-10 ENCOUNTER — TELEPHONE (OUTPATIENT)
Dept: INTERNAL MEDICINE | Facility: CLINIC | Age: 27
End: 2022-08-10

## 2022-08-10 VITALS
HEIGHT: 72 IN | BODY MASS INDEX: 39.68 KG/M2 | HEART RATE: 93 BPM | WEIGHT: 293 LBS | OXYGEN SATURATION: 98 % | TEMPERATURE: 98.1 F | SYSTOLIC BLOOD PRESSURE: 116 MMHG | DIASTOLIC BLOOD PRESSURE: 78 MMHG

## 2022-08-10 DIAGNOSIS — J02.9 PHARYNGITIS, UNSPECIFIED ETIOLOGY: ICD-10-CM

## 2022-08-10 DIAGNOSIS — R50.9 FEVER, UNSPECIFIED FEVER CAUSE: ICD-10-CM

## 2022-08-10 DIAGNOSIS — J01.00 ACUTE NON-RECURRENT MAXILLARY SINUSITIS: Primary | ICD-10-CM

## 2022-08-10 LAB
EXPIRATION DATE: NORMAL
EXPIRATION DATE: NORMAL
FLUAV AG UPPER RESP QL IA.RAPID: NOT DETECTED
FLUBV AG UPPER RESP QL IA.RAPID: NOT DETECTED
INTERNAL CONTROL: NORMAL
INTERNAL CONTROL: NORMAL
Lab: NORMAL
Lab: NORMAL
S PYO AG THROAT QL: NEGATIVE
SARS-COV-2 AG UPPER RESP QL IA.RAPID: NOT DETECTED

## 2022-08-10 PROCEDURE — 87880 STREP A ASSAY W/OPTIC: CPT | Performed by: NURSE PRACTITIONER

## 2022-08-10 PROCEDURE — 87428 SARSCOV & INF VIR A&B AG IA: CPT | Performed by: NURSE PRACTITIONER

## 2022-08-10 PROCEDURE — U0004 COV-19 TEST NON-CDC HGH THRU: HCPCS | Performed by: NURSE PRACTITIONER

## 2022-08-10 PROCEDURE — 87081 CULTURE SCREEN ONLY: CPT | Performed by: NURSE PRACTITIONER

## 2022-08-10 PROCEDURE — 99214 OFFICE O/P EST MOD 30 MIN: CPT | Performed by: NURSE PRACTITIONER

## 2022-08-10 RX ORDER — CEFDINIR 300 MG/1
300 CAPSULE ORAL 2 TIMES DAILY
Qty: 20 CAPSULE | Refills: 0 | Status: SHIPPED | OUTPATIENT
Start: 2022-08-10 | End: 2022-08-20

## 2022-08-10 RX ORDER — BROMPHENIRAMINE MALEATE, PSEUDOEPHEDRINE HYDROCHLORIDE, AND DEXTROMETHORPHAN HYDROBROMIDE 2; 30; 10 MG/5ML; MG/5ML; MG/5ML
10 SYRUP ORAL 4 TIMES DAILY PRN
Qty: 400 ML | Refills: 0 | Status: SHIPPED | OUTPATIENT
Start: 2022-08-10 | End: 2022-08-20

## 2022-08-10 NOTE — TELEPHONE ENCOUNTER
PT(PATIENT) STATES THERE WAS A MISSING MEDICATION FROM HER VISIT THIS MORNING     PT(PATIENT) STATES SHE WAS SUPPOSED TO RECEIVE ACYCLOVIR FOR A BACTERIAL INFECTION    PER PT(PATIENT), PHARMACY ONLY RECEIVED ONE MEDICATION     PT(PATIENT) REQUESTED MEDICATION TO BE SENT TO [facility]     PROVIDER PLEASE ADVISE

## 2022-08-11 ENCOUNTER — TELEPHONE (OUTPATIENT)
Dept: PODIATRY | Facility: CLINIC | Age: 27
End: 2022-08-11

## 2022-08-11 LAB — SARS-COV-2 RNA PNL SPEC NAA+PROBE: NOT DETECTED

## 2022-08-11 NOTE — TELEPHONE ENCOUNTER
Caller: MRS SANABRIA     Relationship to patient: PATIENT     Best call back number: [phone]    Chief complaint:  N/A     Type of visit: INJ F/U     Requested date:  N/A      If rescheduling, when is the original appointment:  8/11/22      Additional notes: PATIENT CALLED IN WITH LATE CANCELLATION PATIENT IS ON QUARANTINE UNTIL  8/15/22 R/S PAT NEXT AVAIL IS 8/26/22            Monitor.

## 2022-08-12 LAB — BACTERIA SPEC AEROBE CULT: NORMAL

## 2022-08-23 ENCOUNTER — OFFICE VISIT (OUTPATIENT)
Dept: INTERNAL MEDICINE | Facility: CLINIC | Age: 27
End: 2022-08-23

## 2022-08-23 ENCOUNTER — PATIENT MESSAGE (OUTPATIENT)
Dept: INTERNAL MEDICINE | Facility: CLINIC | Age: 27
End: 2022-08-23

## 2022-08-23 VITALS
HEIGHT: 72 IN | SYSTOLIC BLOOD PRESSURE: 113 MMHG | OXYGEN SATURATION: 98 % | TEMPERATURE: 97.6 F | WEIGHT: 293 LBS | HEART RATE: 91 BPM | BODY MASS INDEX: 39.68 KG/M2 | DIASTOLIC BLOOD PRESSURE: 80 MMHG

## 2022-08-23 DIAGNOSIS — E66.01 CLASS 3 SEVERE OBESITY WITHOUT SERIOUS COMORBIDITY WITH BODY MASS INDEX (BMI) OF 40.0 TO 44.9 IN ADULT, UNSPECIFIED OBESITY TYPE: Primary | ICD-10-CM

## 2022-08-23 PROCEDURE — 99213 OFFICE O/P EST LOW 20 MIN: CPT | Performed by: INTERNAL MEDICINE

## 2022-08-23 RX ORDER — SEMAGLUTIDE 0.25 MG/.5ML
0.25 INJECTION, SOLUTION SUBCUTANEOUS WEEKLY
Qty: 6 ML | Refills: 3 | Status: SHIPPED | OUTPATIENT
Start: 2022-08-23 | End: 2022-08-24 | Stop reason: ALTCHOICE

## 2022-08-23 NOTE — PROGRESS NOTES
"Chief Complaint  Anxiety (3 month follow-up. Patient is going to start trying to have a baby. Per OBGYN recommendations she was told to go ahead and start weaning herself off her medication. Patient has not had any medication since the beginning of August. Patient does state she is stabilized but does lose her cool sometimes. ) and Weight Check (Patient states she would be interested in Saxenda or Wegovy. Patient states today she has started intermitted fasting. She also has been trying Premier Protein Shakes too.)    Subjective          Purvi Foreman presents to [facility]  History of Present Illness  Anxiety - patient reports she is off her mental health meds. Patient reports doing well off medication. Patient is trying to conceive. Patient has been follow-up with OB. Patient reports coping well at work, but can get frustrating at times.   Patient reports vyvanse. Patient has started trying intermittent fasting. Patient is trying 8/16 diet. Patient has noticed recent weight gain because she is eating late at night.       Current Outpatient Medications   Medication Instructions   • Levonorgestrel (Mirena, 52 MG,) 20 MCG/DAY intrauterine device IUD 1 each, Intrauterine   • loratadine (CLARITIN) 10 MG tablet Claritin 10 mg oral tablet take 1 tablet (10 mg) by oral route once daily   Active   • Wegovy 0.25 mg, Subcutaneous, Weekly       The following portions of the patient's history were reviewed and updated as appropriate: allergies, current medications, past family history, past medical history, past social history, past surgical history, and problem list.    Objective   Vital Signs:   /80 (BP Location: Left arm, Patient Position: Sitting, Cuff Size: Large Adult)   Pulse 91   Temp 97.6 °F (36.4 °C) (Temporal)   Ht 182.9 cm (72\")   Wt 136 kg (300 lb)   SpO2 98%   BMI 40.69 kg/m²     Wt Readings from Last 3 Encounters:   08/23/22 136 kg (300 lb) "   08/10/22 133 kg (294 lb)   07/11/22 129 kg (285 lb)     BP Readings from Last 3 Encounters:   08/23/22 113/80   08/10/22 116/78   08/08/22 130/83     Physical Exam   Appearance: No acute distress, well-nourished  Head: normocephalic, atraumatic  Eyes: extraocular movements intact, no scleral icterus, no conjunctival injection  Ears, Nose, and Throat: external ears normal, nares patent, moist mucous membranes  Cardiovascular: regular rate. no edema  Respiratory: breathing comfortably, symmetric chest rise,   Neuro: alert and oriented to time, place, and person. Normal gait  Psych: normal mood and affect     Result Review :   The following data was reviewed by: Kendrick Thompson Jr, MD on 08/23/2022:  Common labs    Common Labsle 12/2/21 12/2/21 12/2/21 12/2/21    1409 1409 1409 1409   Glucose    92   BUN    14   Creatinine    0.97   eGFR Non African Am    69   Sodium    135 (A)   Potassium    4.2   Chloride    104   Calcium    9.2   Albumin    4.20   Total Bilirubin    0.4   Alkaline Phosphatase    97   AST (SGOT)    13   ALT (SGPT)    7   WBC 7.51      Hemoglobin 12.4      Hematocrit 38.3      Platelets 263      Total Cholesterol   133    Triglycerides   160 (A)    HDL Cholesterol   34 (A)    LDL Cholesterol    71    Hemoglobin A1C  5.14     (A) Abnormal value              Lab Results   Component Value Date    SARSANTIGEN Not Detected 08/10/2022    COVID19 Not Detected 08/10/2022    RAPFLUA Negative 08/08/2022    RAPFLUB Negative 08/08/2022    FLUAAG Not Detected 08/10/2022    FLUBAG Not Detected 08/10/2022    RAPSCRN Negative 08/10/2022    INR 0.95 (L) 01/16/2019          Assessment and Plan    Diagnoses and all orders for this visit:    1. Class 3 severe obesity without serious comorbidity with body mass index (BMI) of 40.0 to 44.9 in adult, unspecified obesity type (HCC) (Primary)  Comments:  discussed intermittent fasting and meal prep. off vyvanse at this time. will trial wegovy to help with weight  loss.   Orders:  -     Semaglutide-Weight Management (Wegovy) 0.25 MG/0.5ML solution auto-injector; Inject 0.25 mg under the skin into the appropriate area as directed 1 (One) Time Per Week.  Dispense: 6 mL; Refill: 3          Medications Discontinued During This Encounter   Medication Reason   • lisdexamfetamine (Vyvanse) 70 MG capsule *Therapy completed          Follow Up   Return in about 6 months (around 2/23/2023).  Patient was given instructions and counseling regarding her condition or for health maintenance advice. Please see specific information pulled into the AVS if appropriate.       Kendrick Thompson Jr, MD  08/23/22  10:11 EDT

## 2022-08-24 ENCOUNTER — TELEPHONE (OUTPATIENT)
Dept: INTERNAL MEDICINE | Facility: CLINIC | Age: 27
End: 2022-08-24

## 2022-08-24 NOTE — TELEPHONE ENCOUNTER
PHARMACY WANTED TO CLARIFY MEDICATIONS     PT(PATIENT) WAS ORIGINALLY SENT IN  Semaglutide-Weight Management (Wegovy) 0.25 MG/0.5ML solution auto-injector (08/23/2022)    PT(PATIENT) WAS THEN CHANGED TO  Liraglutide (SAXENDA) 18 MG/3ML injection pen (08/24/2022)    QTY WAS WRITTEN FOR 3 PENS     WANTS TO MAKE SURE IT IS OK TO CHANGE QTY TO 5 PENS     PHARMACY IS NOT ABLE TO BREAK THE BOX     VERBAL AUTH GIVEN FOR QTY CHANGE

## 2022-08-24 NOTE — TELEPHONE ENCOUNTER
Pharmacy Name:  [facility]     Pharmacy representative name: LEANDRA     Pharmacy representative phone number: [phone]    What medication are you calling in regards to:Liraglutide (SAXENDA) 18 MG/3ML injection pen     What question does the pharmacy have: CLARIFICATION NEEDED    PHARMACY STATES THAT PCP WROTE FOR THREE PENS. HOWEVER, THE BOX COMES WITH 5 PENS. THEREFORE, PHARMACY WANTS TO KNOW IF THEY CAN GIVE PATIENT THE BOX OF 5.     Who is the provider that prescribed the medication:DR. GOODSON

## 2022-08-24 NOTE — TELEPHONE ENCOUNTER
From: Purvi Foreman  To: Kendrick Thompson Jr, MD  Sent: 8/23/2022 5:53 PM EDT  Subject: Prescription change?     I received a call from my pharmacy stating that wegovy is out of stock and the earliest they know of it being available is late October. I know we discussed saxenda at my appointment, would saxenda be able to be called in now since wegovy is out of stock?     Thank you for your time!

## 2022-08-26 ENCOUNTER — OFFICE VISIT (OUTPATIENT)
Dept: PODIATRY | Facility: CLINIC | Age: 27
End: 2022-08-26

## 2022-08-26 VITALS
HEIGHT: 72 IN | SYSTOLIC BLOOD PRESSURE: 111 MMHG | TEMPERATURE: 97.3 F | DIASTOLIC BLOOD PRESSURE: 52 MMHG | BODY MASS INDEX: 39.68 KG/M2 | HEART RATE: 97 BPM | OXYGEN SATURATION: 99 % | WEIGHT: 293 LBS

## 2022-08-26 DIAGNOSIS — M79.671 FOOT PAIN, BILATERAL: Primary | ICD-10-CM

## 2022-08-26 DIAGNOSIS — M72.2 PLANTAR FASCIITIS: ICD-10-CM

## 2022-08-26 DIAGNOSIS — M79.672 FOOT PAIN, BILATERAL: Primary | ICD-10-CM

## 2022-08-26 PROCEDURE — 99213 OFFICE O/P EST LOW 20 MIN: CPT | Performed by: PODIATRIST

## 2022-08-26 NOTE — PROGRESS NOTES
[facility] - PODIATRY    Today's Date: 08/26/22    Patient Name: Purvi Foreman  MRN: [id number]  CSN: [id number]  PCP: Kendrick Thompson Jr., MD  Referring Provider: No ref. provider found    SUBJECTIVE     Chief Complaint   Patient presents with   • Left Foot - Follow-up, Plantar Fasciitis     Injection follow up / feeling much better   • Right Foot - Follow-up, Plantar Fasciitis     Injection follow up / feeling much better     HPI: Purvi Foreman, a 27 y.o.female, comes to clinic.    New, Established, New Problem: Established    Location: Bilateral heels    Duration: 2018    Onset:  gradual    Nature:  achy, sharp, shooting    Stable, worsening, improving: Worsening    Aggravating factors:  Patient describes morning bilateral heel pain as stabbing, burning, or aching. This pain usually subsides throughout the day, however it returns after periods of rest and sitting, when standing back up on their feet, and again the next morning.      Previous Treatment: Change in shoe, x-rays, cortisone injections on last visit    Patient denies any fevers, chills, nausea, vomiting, shortness of breath, nor any other constitutional signs nor symptoms.    Patient relates no medical changes since their last visit.    Past Medical History:   Diagnosis Date   • Anxiety    • Depression    • Foot pain, bilateral    • History of gestational hypertension    • Hypertension     No meds   • Migraine WITH aura    • Seasonal allergies      Past Surgical History:   Procedure Laterality Date   • APPENDECTOMY     • RETAINED PLACENTA REMOVAL      D+C w second tri loss     Family History   Problem Relation Age of Onset   • Endometrial cancer Mother         HYPERPLASIA   • Hypertension Mother    • Hypertension Father    • Diabetes Father    • Esophageal cancer Maternal Grandfather    • Lung cancer Paternal Grandmother    • Stroke Neg Hx    • Breast cancer Neg Hx    • Colon cancer Neg Hx    • Ovarian cancer Neg  Hx      Social History     Socioeconomic History   • Marital status:    Tobacco Use   • Smoking status: Never Smoker   • Smokeless tobacco: Never Used   Vaping Use   • Vaping Use: Never used   Substance and Sexual Activity   • Alcohol use: Yes     Comment: drinks rarely   • Drug use: Never   • Sexual activity: Yes     Partners: Male     Birth control/protection: I.U.D.     Comment: Mirena     Allergies   Allergen Reactions   • Penicillins Swelling and Rash   • Latex Rash     Current Outpatient Medications   Medication Sig Dispense Refill   • Levonorgestrel (Mirena, 52 MG,) 20 MCG/DAY intrauterine device IUD 1 each by Intrauterine route.     • Liraglutide (SAXENDA) 18 MG/3ML injection pen Inject 0.6mg under skin daily for week one, THEN 1.2mg daily for week two, THEN 1.8mg daily for week three, then 2.4mg daily for week four. 3 pen 0   • loratadine (CLARITIN) 10 MG tablet Claritin 10 mg oral tablet take 1 tablet (10 mg) by oral route once daily   Active       No current facility-administered medications for this visit.     Review of Systems   Constitutional: Negative.    Musculoskeletal:        Bilateral heel pain, significant improvement   All other systems reviewed and are negative.      OBJECTIVE     Vitals:    08/26/22 0700   BP: 111/52   Pulse: 97   Temp: 97.3 °F (36.3 °C)   SpO2: 99%       PHYSICAL EXAM     Foot/Ankle Exam:       General:   Appearance comment:  Morbidly obese  Orientation: AAOx3    Affect: appropriate    Gait: unimpaired    Shoe Gear:  Casual shoes    VASCULAR      Right Foot Vascularity   Normal vascular exam    Dorsalis pedis:  2+  Posterior tibial:  2+  Skin Temperature: warm    Edema Grading:  None  CFT:  < 3 seconds  Pedal Hair Growth:  Present  Varicosities: none       Left Foot Vascularity   Normal vascular exam    Dorsalis pedis:  2+  Posterior tibial:  2+  Skin Temperature: warm    Edema Grading:  None  CFT:  < 3 seconds  Pedal Hair Growth:  Present  Varicosities: none         NEUROLOGIC     Right Foot Neurologic   Normal sensation    Light touch sensation:  Normal  Vibratory sensation:  Normal  Hot/Cold sensation: normal       Left Foot Neurologic   Normal sensation    Light touch sensation:  Normal  Vibratory sensation:  Normal  Hot/cold sensation: normal       MUSCULOSKELETAL      Right Foot Musculoskeletal   Tenderness: plantar fascia and plantar heel    Tenderness comment:  Minimal pain     Left Foot Musculoskeletal   Tenderness: plantar fascia and plantar heel    Tenderness comment:  Minimal pain     MUSCLE STRENGTH     Right Foot Muscle Strength   Foot dorsiflexion:  4  Foot plantar flexion:  4  Foot inversion:  4  Foot eversion:  4     Left Foot Muscle Strength   Foot dorsiflexion:  4  Foot plantar flexion:  4  Foot inversion:  4  Foot eversion:  4     RANGE OF MOTION      Right Foot Range of Motion   Foot and ankle ROM within normal limits       Left Foot Range of Motion   Foot and ankle ROM within normal limits       DERMATOLOGIC     Right Foot Dermatologic   Skin: skin intact    Nails: normal       Left Foot Dermatologic   Skin: skin intact    Nails: normal        RADIOLOGY:    No results found.     ASSESSMENT/PLAN     Diagnoses and all orders for this visit:    1. Foot pain, bilateral (Primary)    2. Plantar fasciitis    Prescriptions written for custom-made orthotics.    Comprehensive lower extremity examination and evaluation was performed.    Discussed findings and treatment plan including risks, benefits, and treatment options with patient in detail. Patient agreed with treatment plan.    Patient is to monitor for recurrence and any new symptoms and to contact Dr. Thomas's office for a follow-up appointment.      The patient states understanding and agreement with this plan.    Treatment Options discussed:  - no treatment at all  - change in shoegear  - change in activities  - RICE therapy  - arch support  - NSAIDs  - PO steroids  - injectable steroids    Patient may  begin to weight bear as tolerated in supportive shoes.  No impact activities for two weeks.  After that time, the patient may increase activities as tolerated. Patient states understanding and agreement with this plan.    An After Visit Summary was printed and given to the patient at discharge, including (if requested) any available informative/educational handouts regarding diagnosis, treatment, or medications. All questions were answered to patient/family satisfaction. Should symptoms fail to improve or worsen they agree to call or return to clinic or to go to the Emergency Department. Discussed the importance of following up with any needed screening tests/labs/specialist appointments and any requested follow-up recommended by me today. Importance of maintaining follow-up discussed and patient accepts that missed appointments can delay diagnosis and potentially lead to worsening of conditions.    Return if symptoms worsen or fail to improve., or sooner if acute issues arise.    This document has been electronically signed by Rigoberto Thomas DPM on August 26, 2022 07:10 EDT

## 2022-09-15 ENCOUNTER — TELEPHONE (OUTPATIENT)
Dept: OBSTETRICS AND GYNECOLOGY | Facility: CLINIC | Age: 27
End: 2022-09-15

## 2022-10-04 ENCOUNTER — TELEPHONE (OUTPATIENT)
Dept: INTERNAL MEDICINE | Facility: CLINIC | Age: 27
End: 2022-10-04

## 2022-10-04 NOTE — TELEPHONE ENCOUNTER
PATIENT WAS CALLED AND VM LEFT TO CALL THE OFFICE AND RESCHEDULE APPT. PROVIDER OUT OF THE OFFICE UNTIL MARCH 9TH, 2022     HUB OKAY TO SCHEDULE NEXT AVAILABLE

## 2022-10-05 NOTE — PROGRESS NOTES
IUD Removal      Chief Complaint   Patient presents with   • IUD Removal       I reviewed the procedure in detail.  She understand the potential risks include, but are not limited to, pain, bleeding, and infection.  Her questions have been answered.    Subjective:  Desires another pregnancy    Objective:  /72   Wt 130 kg (286 lb)   BMI 38.79 kg/m²   General- NAD, alert and oriented, appropriate  Psych- Normal mood, good memory  Abdomen- Soft, non distended, non tender, no masses  External genitalia- Normal, no lesions  Vagina- Normal, no discharge  Cervix- Normal without lesion or discharge. IUD REMOVAL: Strings visualized, IUD removed intact.  Discarded.    Patient tolerated the procedure well.    ASSESSMENT AND PLAN:    Diagnoses and all orders for this visit:    1. Encounter for IUD removal (Primary)    2. Desire for pregnancy  -     Prenatal Vit-Fe Sulfate-FA-DHA (Prenatal Vitamin/Min +DHA) 27-0.8-200 MG capsule; Take 1 each by mouth Daily.  Dispense: 90 capsule; Refill: 4          Counseling:  She understand she can become pregnant immediately.  I recommend she keep track of menses and RTO if <q21d, >7d long, heavy or painful.            PRECAUTIONS - She was advised to watch for fever, chills, vaginal discharge or odor.      Follow Up:  Return with a + preg test for new OB intake.        Gertrude Bower DO  10/10/2022    [facility]  [facility] [place] [address]  Dept: [phone]  Dept Fax: [phone]  Loc: [phone]  Loc Fax: [phone]

## 2022-10-10 ENCOUNTER — OFFICE VISIT (OUTPATIENT)
Dept: OBSTETRICS AND GYNECOLOGY | Facility: CLINIC | Age: 27
End: 2022-10-10

## 2022-10-10 VITALS — DIASTOLIC BLOOD PRESSURE: 72 MMHG | BODY MASS INDEX: 38.79 KG/M2 | WEIGHT: 286 LBS | SYSTOLIC BLOOD PRESSURE: 114 MMHG

## 2022-10-10 DIAGNOSIS — Z30.432 ENCOUNTER FOR IUD REMOVAL: Primary | ICD-10-CM

## 2022-10-10 DIAGNOSIS — Z31.9 DESIRE FOR PREGNANCY: ICD-10-CM

## 2022-10-10 PROCEDURE — 58301 REMOVE INTRAUTERINE DEVICE: CPT | Performed by: OBSTETRICS & GYNECOLOGY

## 2022-10-10 RX ORDER — AZITHROMYCIN 1 G
1 PACKET (EA) ORAL ONCE
COMMUNITY
End: 2022-10-26

## 2022-10-10 RX ORDER — CHOLECALCIFEROL (VITAMIN D3) 25 MCG
1 TABLET,CHEWABLE ORAL DAILY
Qty: 90 CAPSULE | Refills: 4 | Status: SHIPPED | OUTPATIENT
Start: 2022-10-10

## 2022-10-26 ENCOUNTER — OFFICE VISIT (OUTPATIENT)
Dept: PODIATRY | Facility: CLINIC | Age: 27
End: 2022-10-26

## 2022-10-26 VITALS
HEART RATE: 90 BPM | TEMPERATURE: 96.7 F | WEIGHT: 293 LBS | SYSTOLIC BLOOD PRESSURE: 136 MMHG | DIASTOLIC BLOOD PRESSURE: 76 MMHG | OXYGEN SATURATION: 100 % | BODY MASS INDEX: 39.68 KG/M2 | HEIGHT: 72 IN

## 2022-10-26 DIAGNOSIS — M72.2 PLANTAR FASCIITIS: ICD-10-CM

## 2022-10-26 DIAGNOSIS — M79.671 FOOT PAIN, BILATERAL: Primary | ICD-10-CM

## 2022-10-26 DIAGNOSIS — M79.672 FOOT PAIN, BILATERAL: Primary | ICD-10-CM

## 2022-10-26 PROCEDURE — 20550 NJX 1 TENDON SHEATH/LIGAMENT: CPT | Performed by: PODIATRIST

## 2022-10-26 RX ORDER — DEXAMETHASONE SODIUM PHOSPHATE 4 MG/ML
2 INJECTION, SOLUTION INTRA-ARTICULAR; INTRALESIONAL; INTRAMUSCULAR; INTRAVENOUS; SOFT TISSUE ONCE
Status: COMPLETED | OUTPATIENT
Start: 2022-10-26 | End: 2022-10-26

## 2022-10-26 RX ORDER — TRIAMCINOLONE ACETONIDE 40 MG/ML
20 INJECTION, SUSPENSION INTRA-ARTICULAR; INTRAMUSCULAR ONCE
Status: COMPLETED | OUTPATIENT
Start: 2022-10-26 | End: 2022-10-26

## 2022-10-26 RX ORDER — LIDOCAINE HYDROCHLORIDE 10 MG/ML
0.5 INJECTION, SOLUTION INFILTRATION; PERINEURAL ONCE
Status: COMPLETED | OUTPATIENT
Start: 2022-10-26 | End: 2022-10-26

## 2022-10-26 RX ADMIN — LIDOCAINE HYDROCHLORIDE 0.5 ML: 10 INJECTION, SOLUTION INFILTRATION; PERINEURAL at 14:53

## 2022-10-26 RX ADMIN — TRIAMCINOLONE ACETONIDE 20 MG: 40 INJECTION, SUSPENSION INTRA-ARTICULAR; INTRAMUSCULAR at 14:54

## 2022-10-26 RX ADMIN — DEXAMETHASONE SODIUM PHOSPHATE 2 MG: 4 INJECTION, SOLUTION INTRA-ARTICULAR; INTRALESIONAL; INTRAMUSCULAR; INTRAVENOUS; SOFT TISSUE at 14:52

## 2022-10-26 RX ADMIN — LIDOCAINE HYDROCHLORIDE 0.5 ML: 10 INJECTION, SOLUTION INFILTRATION; PERINEURAL at 14:54

## 2022-10-26 RX ADMIN — TRIAMCINOLONE ACETONIDE 20 MG: 40 INJECTION, SUSPENSION INTRA-ARTICULAR; INTRAMUSCULAR at 14:55

## 2022-10-26 NOTE — PROGRESS NOTES
[facility] - PODIATRY    Today's Date: 10/26/22    Patient Name: Purvi Foreman  MRN: [id number]  CSN: [id number]  PCP: Kendrick Thompson Jr., MD  Referring Provider: No ref. provider found    SUBJECTIVE     Chief Complaint   Patient presents with   • Left Foot - Follow-up, Plantar Fasciitis, Pain     Wants injection    • Right Foot - Follow-up, Plantar Fasciitis, Pain     Wants injection      HPI: Purvi Foreman, a 27 y.o.female, comes to clinic.    New, Established, New Problem: Established    Location: Bilateral heels    Duration: 2018    Onset:  gradual    Nature:  achy, sharp, shooting    Stable, worsening, improving: Worsening    Aggravating factors:  Patient describes morning bilateral heel pain as stabbing, burning, or aching. This pain usually subsides throughout the day, however it returns after periods of rest and sitting, when standing back up on their feet, and again the next morning.      Previous Treatment: Change in shoe, x-rays, cortisone injections    Patient denies any fevers, chills, nausea, vomiting, shortness of breath, nor any other constitutional signs nor symptoms.    Medical changes:  none.      Past Medical History:   Diagnosis Date   • Anxiety    • Depression    • Foot pain, bilateral    • History of gestational hypertension    • Hypertension     No meds   • Migraine WITH aura    • Seasonal allergies      Past Surgical History:   Procedure Laterality Date   • APPENDECTOMY     • RETAINED PLACENTA REMOVAL      D+C w second tri loss     Family History   Problem Relation Age of Onset   • Endometrial cancer Mother         HYPERPLASIA   • Hypertension Mother    • Hypertension Father    • Diabetes Father    • Esophageal cancer Maternal Grandfather    • Lung cancer Paternal Grandmother    • Stroke Neg Hx    • Breast cancer Neg Hx    • Colon cancer Neg Hx    • Ovarian cancer Neg Hx      Social History     Socioeconomic History   • Marital status:    Tobacco  Use   • Smoking status: Never   • Smokeless tobacco: Never   Vaping Use   • Vaping Use: Never used   Substance and Sexual Activity   • Alcohol use: Yes     Comment: drinks rarely   • Drug use: Never   • Sexual activity: Yes     Partners: Male     Birth control/protection: I.U.D.     Comment: Mirena     Allergies   Allergen Reactions   • Penicillins Swelling and Rash   • Latex Rash     Current Outpatient Medications   Medication Sig Dispense Refill   • loratadine (CLARITIN) 10 MG tablet As Needed.     • Prenatal Vit-Fe Sulfate-FA-DHA (Prenatal Vitamin/Min +DHA) 27-0.8-200 MG capsule Take 1 each by mouth Daily. 90 capsule 4     Current Facility-Administered Medications   Medication Dose Route Frequency Provider Last Rate Last Admin   • dexamethasone sodium phosphate injection 2 mg  2 mg Intramuscular Once Rigoberto Thomas DPM       • dexamethasone sodium phosphate injection 2 mg  2 mg Intramuscular Once Rigoberto Thomas DPM       • lidocaine (XYLOCAINE) 1 % injection 0.5 mL  0.5 mL Infiltration Once Rigoberto Thomas DPM       • lidocaine (XYLOCAINE) 1 % injection 0.5 mL  0.5 mL Infiltration Once Rigoberto Thomas DPM       • triamcinolone acetonide (KENALOG-40) injection 20 mg  20 mg Intramuscular Once Rigoberto Thomas DPM       • triamcinolone acetonide (KENALOG-40) injection 20 mg  20 mg Intramuscular Once Rigoberto Thomas DPM         Review of Systems   Constitutional: Negative.    Musculoskeletal:        Bilateral heel pain   All other systems reviewed and are negative.      OBJECTIVE     Vitals:    10/26/22 1256   BP: 136/76   Pulse: 90   Temp: 96.7 °F (35.9 °C)   SpO2: 100%       PHYSICAL EXAM     Foot/Ankle Exam:       General:   Appearance comment:  Morbidly obese  Orientation: AAOx3    Affect: appropriate    Gait: unimpaired    Shoe Gear:  Casual shoes    VASCULAR      Right Foot Vascularity   Normal vascular exam    Dorsalis pedis:  2+  Posterior tibial:  2+  Skin  Temperature: warm    Edema Grading:  None  CFT:  < 3 seconds  Pedal Hair Growth:  Present  Varicosities: none       Left Foot Vascularity   Normal vascular exam    Dorsalis pedis:  2+  Posterior tibial:  2+  Skin Temperature: warm    Edema Grading:  None  CFT:  < 3 seconds  Pedal Hair Growth:  Present  Varicosities: none        NEUROLOGIC     Right Foot Neurologic   Normal sensation    Light touch sensation:  Normal  Vibratory sensation:  Normal  Hot/Cold sensation: normal       Left Foot Neurologic   Normal sensation    Light touch sensation:  Normal  Vibratory sensation:  Normal  Hot/cold sensation: normal       MUSCULOSKELETAL      Right Foot Musculoskeletal   Tenderness: plantar fascia and plantar heel       Left Foot Musculoskeletal   Tenderness: plantar fascia and plantar heel       MUSCLE STRENGTH     Right Foot Muscle Strength   Foot dorsiflexion:  4  Foot plantar flexion:  4  Foot inversion:  4  Foot eversion:  4     Left Foot Muscle Strength   Foot dorsiflexion:  4  Foot plantar flexion:  4  Foot inversion:  4  Foot eversion:  4     RANGE OF MOTION      Right Foot Range of Motion   Foot and ankle ROM within normal limits       Left Foot Range of Motion   Foot and ankle ROM within normal limits       DERMATOLOGIC     Right Foot Dermatologic   Skin: skin intact    Nails: normal       Left Foot Dermatologic   Skin: skin intact    Nails: normal       ASSESSMENT/PLAN     Diagnoses and all orders for this visit:    1. Foot pain, bilateral (Primary)    2. Plantar fasciitis  -     Ambulatory Referral For Orthotics  -     lidocaine (XYLOCAINE) 1 % injection 0.5 mL  -     dexamethasone sodium phosphate injection 2 mg  -     triamcinolone acetonide (KENALOG-40) injection 20 mg  -     dexamethasone sodium phosphate injection 2 mg  -     lidocaine (XYLOCAINE) 1 % injection 0.5 mL  -     triamcinolone acetonide (KENALOG-40) injection 20 mg    Prescriptions written for custom-made orthotics.    Comprehensive lower  extremity examination and evaluation was performed.    Discussed findings and treatment plan including risks, benefits, and treatment options with patient in detail. Patient agreed with treatment plan.    Patient is to monitor for recurrence and any new symptoms and to contact Dr. Thomas's office for a follow-up appointment.      The patient states understanding and agreement with this plan.    Treatment Options discussed:  - no treatment at all  - change in shoegear  - change in activities  - RICE therapy  - arch support  - NSAIDs  - PO steroids  - injectable steroids    Plantar Fasciitis Injection  Date/Time: 10/26/2022  Performed by: Rigoberto Thomas DPM  Authorized by: Rigoberto Thomas DPM     Consent: Verbal consent obtained. Written consent obtained.  Risks and benefits: risks, benefits and alternatives were discussed  Consent given by: patient  Patient identity confirmed: verbally with patient  Indications: pain relief    Injection site: b/l heel.    Sedation:  Patient sedated: no    Patient position: sitting  Needle size: 27 G  Local anesthetic: 0.5 ml 1% Lidocaine plain, 0.5 ml Kenalog 40 mg/ml, and 0.5 ml Decadron 4 mg/mL.   Outcome: pain improved  Patient tolerance: Patient tolerated the procedure well with no immediate complications    Patient may begin to weight bear as tolerated in supportive shoes.  No impact activities for two weeks.  After that time, the patient may increase activities as tolerated. Patient states understanding and agreement with this plan.    An After Visit Summary was printed and given to the patient at discharge, including (if requested) any available informative/educational handouts regarding diagnosis, treatment, or medications. All questions were answered to patient/family satisfaction. Should symptoms fail to improve or worsen they agree to call or return to clinic or to go to the Emergency Department. Discussed the importance of following up with any needed screening  tests/labs/specialist appointments and any requested follow-up recommended by me today. Importance of maintaining follow-up discussed and patient accepts that missed appointments can delay diagnosis and potentially lead to worsening of conditions.    Return if symptoms worsen or fail to improve., or sooner if acute issues arise.    This document has been electronically signed by Rigoberto Thomas DPM on October 26, 2022 13:58 EDT

## 2022-10-27 ENCOUNTER — TELEPHONE (OUTPATIENT)
Dept: OBSTETRICS AND GYNECOLOGY | Facility: CLINIC | Age: 27
End: 2022-10-27

## 2022-10-27 RX ORDER — SERTRALINE HYDROCHLORIDE 25 MG/1
25 TABLET, FILM COATED ORAL DAILY
Qty: 30 TABLET | Refills: 2 | Status: SHIPPED | OUTPATIENT
Start: 2022-10-27 | End: 2023-03-14

## 2022-10-27 NOTE — TELEPHONE ENCOUNTER
Patient requesting something for stress,anxiety that is safe to take during pregnancy (not pregnant yet). Has taken Zoloft in the past. Please advise.

## 2023-01-10 NOTE — PROGRESS NOTES
"GYN Visit    Chief Complaint   Patient presents with   • LOW LIBIDO, fatigue, irreg menses since IUD removal       HPI:   27 y.o. LMP: Patient's last menstrual period was 2023.     Social History     Substance and Sexual Activity   Sexual Activity Yes   • Partners: Male   • Birth control/protection: None    Comment: Mirena     Mirena IUD removed 2022.  Had heavy menses x2 days after removal.  Then no menses until 1Jan - 3Jan, change q3hrs.  No pain.  Menses before G1 were monthly.  Desires preg.  Took 16mo w G1 to conceive.      ROS-  No hot flashes or night sweats.  No hair growth.  Increasing acne.  Fatigue and difficulty losing wt.     On Zoloft 25 mg for anxiety and depression, getting worse.  A lot of stress work, overwhelmed. Holiday's stressful.  Stress w kids wt and apptments. Low libido since Nov. Exhausted.      History: PMHx, Meds, Allergies, PSHx, Social Hx, and POBHx all reviewed and updated.    PHYSICAL EXAM:  /84   Pulse 117   Ht 182.9 cm (72\")   Wt 133 kg (293 lb)   LMP 2023   BMI 39.74 kg/m²   General- NAD, alert and oriented, appropriate  Psych- Normal mood, good memory      ASSESSMENT AND PLAN:  Diagnoses and all orders for this visit:    1. Irregular menses (Primary)  -     Prolactin  -     T4, Free  -     TSH    2. Low libido  Assessment & Plan:  Discussed today several etiologies of low libido suspected.  Significant home and work stress.  Also discussed SSRI can cause decreased libido.  Discussed potential option of Wellbutrin and reviewed repro tox inconsistent association with fetal cardiac anomalies and human studies.  Potential option to increased Zoloft to 50 mg or wean off Zoloft.  At this point patient wants to continue 25 mg daily.      3. Fatigue  -     CBC & Differential  -     Cholecalciferol (Vitamin D) 50 MCG (2000 UT) tablet; Take 2,000 Units by mouth Daily.  Dispense: 30 tablet; Refill: 1          Follow Up:  Return if symptoms worsen or " fail to improve.      Gertrude Bower,   01/13/2023    [facility]  [address] [place] [address]  Dept: [phone]  Dept Fax: [phone]  Loc: [phone]  Loc Fax: [phone]

## 2023-01-13 ENCOUNTER — OFFICE VISIT (OUTPATIENT)
Dept: OBSTETRICS AND GYNECOLOGY | Facility: CLINIC | Age: 28
End: 2023-01-13
Payer: COMMERCIAL

## 2023-01-13 VITALS
DIASTOLIC BLOOD PRESSURE: 84 MMHG | SYSTOLIC BLOOD PRESSURE: 130 MMHG | BODY MASS INDEX: 39.68 KG/M2 | WEIGHT: 293 LBS | HEIGHT: 72 IN | HEART RATE: 117 BPM

## 2023-01-13 DIAGNOSIS — R68.82 LOW LIBIDO: ICD-10-CM

## 2023-01-13 DIAGNOSIS — N92.6 IRREGULAR MENSES: Primary | ICD-10-CM

## 2023-01-13 DIAGNOSIS — R53.83 OTHER FATIGUE: ICD-10-CM

## 2023-01-13 LAB
BASOPHILS # BLD AUTO: 0.04 10*3/MM3 (ref 0–0.2)
BASOPHILS NFR BLD AUTO: 0.5 % (ref 0–1.5)
DEPRECATED RDW RBC AUTO: 39.5 FL (ref 37–54)
EOSINOPHIL # BLD AUTO: 0.2 10*3/MM3 (ref 0–0.4)
EOSINOPHIL NFR BLD AUTO: 2.6 % (ref 0.3–6.2)
ERYTHROCYTE [DISTWIDTH] IN BLOOD BY AUTOMATED COUNT: 13.5 % (ref 12.3–15.4)
HCT VFR BLD AUTO: 39.4 % (ref 34–46.6)
HGB BLD-MCNC: 12.8 G/DL (ref 12–15.9)
IMM GRANULOCYTES # BLD AUTO: 0.02 10*3/MM3 (ref 0–0.05)
IMM GRANULOCYTES NFR BLD AUTO: 0.3 % (ref 0–0.5)
LYMPHOCYTES # BLD AUTO: 1.92 10*3/MM3 (ref 0.7–3.1)
LYMPHOCYTES NFR BLD AUTO: 25.1 % (ref 19.6–45.3)
MCH RBC QN AUTO: 26.6 PG (ref 26.6–33)
MCHC RBC AUTO-ENTMCNC: 32.5 G/DL (ref 31.5–35.7)
MCV RBC AUTO: 81.9 FL (ref 79–97)
MONOCYTES # BLD AUTO: 0.35 10*3/MM3 (ref 0.1–0.9)
MONOCYTES NFR BLD AUTO: 4.6 % (ref 5–12)
NEUTROPHILS NFR BLD AUTO: 5.12 10*3/MM3 (ref 1.7–7)
NEUTROPHILS NFR BLD AUTO: 66.9 % (ref 42.7–76)
NRBC BLD AUTO-RTO: 0 /100 WBC (ref 0–0.2)
PLATELET # BLD AUTO: 291 10*3/MM3 (ref 140–450)
PMV BLD AUTO: 10.6 FL (ref 6–12)
PROLACTIN SERPL-MCNC: 15.6 NG/ML (ref 4.79–23.3)
RBC # BLD AUTO: 4.81 10*6/MM3 (ref 3.77–5.28)
T4 FREE SERPL-MCNC: 1.2 NG/DL (ref 0.93–1.7)
TSH SERPL DL<=0.05 MIU/L-ACNC: 1.25 UIU/ML (ref 0.27–4.2)
WBC NRBC COR # BLD: 7.65 10*3/MM3 (ref 3.4–10.8)

## 2023-01-13 PROCEDURE — 84146 ASSAY OF PROLACTIN: CPT | Performed by: OBSTETRICS & GYNECOLOGY

## 2023-01-13 PROCEDURE — 84443 ASSAY THYROID STIM HORMONE: CPT | Performed by: OBSTETRICS & GYNECOLOGY

## 2023-01-13 PROCEDURE — 84439 ASSAY OF FREE THYROXINE: CPT | Performed by: OBSTETRICS & GYNECOLOGY

## 2023-01-13 PROCEDURE — 85025 COMPLETE CBC W/AUTO DIFF WBC: CPT | Performed by: OBSTETRICS & GYNECOLOGY

## 2023-01-13 PROCEDURE — 99214 OFFICE O/P EST MOD 30 MIN: CPT | Performed by: OBSTETRICS & GYNECOLOGY

## 2023-01-13 RX ORDER — CHOLECALCIFEROL (VITAMIN D3) 50 MCG
2000 TABLET ORAL DAILY
Qty: 30 TABLET | Refills: 1 | Status: SHIPPED | OUTPATIENT
Start: 2023-01-13

## 2023-01-13 NOTE — ASSESSMENT & PLAN NOTE
Discussed today several etiologies of low libido suspected.  Significant home and work stress.  Also discussed SSRI can cause decreased libido.  Discussed potential option of Wellbutrin and reviewed repro tox inconsistent association with fetal cardiac anomalies and human studies.  Potential option to increased Zoloft to 50 mg or wean off Zoloft.  At this point patient wants to continue 25 mg daily.

## 2023-01-30 ENCOUNTER — TELEPHONE (OUTPATIENT)
Dept: OBSTETRICS AND GYNECOLOGY | Facility: CLINIC | Age: 28
End: 2023-01-30
Payer: COMMERCIAL

## 2023-01-30 RX ORDER — ACETAMINOPHEN AND CODEINE PHOSPHATE 120; 12 MG/5ML; MG/5ML
1 SOLUTION ORAL DAILY
Qty: 90 TABLET | Refills: 4 | Status: SHIPPED | OUTPATIENT
Start: 2023-01-30 | End: 2023-03-14 | Stop reason: ALTCHOICE

## 2023-01-30 NOTE — TELEPHONE ENCOUNTER
Discussed results and recommendations with patient. She is aware to use a back up method until her second pill pack.

## 2023-02-01 ENCOUNTER — TELEPHONE (OUTPATIENT)
Dept: OBSTETRICS AND GYNECOLOGY | Facility: CLINIC | Age: 28
End: 2023-02-01
Payer: COMMERCIAL

## 2023-02-01 DIAGNOSIS — J01.90 ACUTE SINUSITIS, RECURRENCE NOT SPECIFIED, UNSPECIFIED LOCATION: Primary | ICD-10-CM

## 2023-02-01 RX ORDER — AZITHROMYCIN 250 MG/1
TABLET, FILM COATED ORAL
Qty: 6 TABLET | Refills: 0 | Status: SHIPPED | OUTPATIENT
Start: 2023-02-01 | End: 2023-02-06

## 2023-02-03 RX ORDER — SERTRALINE HYDROCHLORIDE 25 MG/1
25 TABLET, FILM COATED ORAL DAILY
Qty: 90 TABLET | Refills: 0 | Status: SHIPPED | OUTPATIENT
Start: 2023-02-03 | End: 2023-03-29

## 2023-03-14 ENCOUNTER — OFFICE VISIT (OUTPATIENT)
Dept: INTERNAL MEDICINE | Facility: CLINIC | Age: 28
End: 2023-03-14
Payer: COMMERCIAL

## 2023-03-14 VITALS
TEMPERATURE: 97.4 F | SYSTOLIC BLOOD PRESSURE: 125 MMHG | WEIGHT: 293 LBS | DIASTOLIC BLOOD PRESSURE: 83 MMHG | HEIGHT: 72 IN | BODY MASS INDEX: 39.68 KG/M2 | HEART RATE: 106 BPM | OXYGEN SATURATION: 99 %

## 2023-03-14 DIAGNOSIS — F50.81 BINGE EATING DISORDER: Primary | ICD-10-CM

## 2023-03-14 DIAGNOSIS — F41.9 ANXIETY: ICD-10-CM

## 2023-03-14 PROCEDURE — 80305 DRUG TEST PRSMV DIR OPT OBS: CPT | Performed by: INTERNAL MEDICINE

## 2023-03-14 PROCEDURE — 99213 OFFICE O/P EST LOW 20 MIN: CPT | Performed by: INTERNAL MEDICINE

## 2023-03-14 NOTE — PROGRESS NOTES
"Chief Complaint  Anxiety (Follow up ) and Obesity (Follow up )    Subjective          Purvi Foreman presents to [facility]  History of Present Illness  Obesity - patient has been on topamax and wellbutrin to help. Patient noticed 10lb weight loss. She stopped topamax due to side effects. She also stopped wellbutrin. Patient is on vyvanse and thinks it is helping. Patient would like to lose more weight.       Current Outpatient Medications   Medication Instructions   • lisdexamfetamine (VYVANSE) 70 mg, Oral, Every Morning   • Prenatal Vit-Fe Sulfate-FA-DHA (Prenatal Vitamin/Min +DHA) 27-0.8-200 MG capsule 1 each, Oral, Daily   • sertraline (ZOLOFT) 25 mg, Oral, Daily   • Vitamin D 2,000 Units, Oral, Daily       The following portions of the patient's history were reviewed and updated as appropriate: allergies, current medications, past family history, past medical history, past social history, past surgical history, and problem list.    Objective   Vital Signs:   /83 (BP Location: Left arm)   Pulse 106   Temp 97.4 °F (36.3 °C) (Temporal)   Ht 182.9 cm (72\")   Wt 135 kg (298 lb 3.2 oz)   SpO2 99%   BMI 40.44 kg/m²     Wt Readings from Last 3 Encounters:   03/14/23 135 kg (298 lb 3.2 oz)   01/13/23 133 kg (293 lb)   10/26/22 (!) 137 kg (302 lb)     BP Readings from Last 3 Encounters:   03/14/23 125/83   01/13/23 130/84   10/26/22 136/76     Physical Exam   Appearance: No acute distress, well-nourished  Head: normocephalic, atraumatic  Eyes: extraocular movements intact, no scleral icterus, no conjunctival injection  Ears, Nose, and Throat: external ears normal, nares patent, moist mucous membranes  Cardiovascular: regular rate. no edema  Respiratory: breathing comfortably, symmetric chest rise  Neuro: alert and oriented to time, place, and person. Normal gait  Psych: normal mood and affect     Result Review :   The following data was reviewed by: Kendrick" Ras Thompson Jr, MD on 03/14/2023:  Common labs    Common Labs 1/13/23   WBC 7.65   Hemoglobin 12.8   Hematocrit 39.4   Platelets 291             Lab Results   Component Value Date    SARSANTIGEN Not Detected 08/10/2022    COVID19 Not Detected 08/10/2022    RAPFLUA Negative 08/08/2022    RAPFLUB Negative 08/08/2022    FLUAAG Not Detected 08/10/2022    FLUBAG Not Detected 08/10/2022    RAPSCRN Negative 08/10/2022    INR 0.95 (L) 01/16/2019       Last Urine Toxicity     LAST URINE TOXICITY RESULTS Latest Ref Rng & Units 3/14/2023 5/26/2022    AMPHETAMINES SCREEN, URINE Negative Positive(A) Positive(A)    BARBITURATES SCREEN Negative Negative Negative    BENZODIAZEPINE SCREEN, URINE Negative Negative Negative    BUPRENORPHINEUR Negative Negative Negative    COCAINE SCREEN, URINE Negative Negative Negative    METHADONE SCREEN, URINE Negative Negative Negative    METHAMPHETAMINEUR Negative Negative Negative             Assessment and Plan    Diagnoses and all orders for this visit:    1. Binge eating disorder (Primary)  Comments:  cont vyvanse as discussed. SARAH and william reviewed.   Orders:  -     lisdexamfetamine (VYVANSE) 70 MG capsule; Take 1 capsule by mouth Every Morning  Dispense: 30 capsule; Refill: 0    2. Anxiety  Comments:  doing well on sertraline and will cont.   Orders:  -     POC Urine Drug Screen [facility]          Medications Discontinued During This Encounter   Medication Reason   • norethindrone (MICRONOR) 0.35 MG tablet Alternate therapy   • sertraline (ZOLOFT) 25 MG tablet *Error   • lisdexamfetamine (VYVANSE) 70 MG capsule Reorder          Follow Up   Return in about 4 months (around 7/14/2023) for Recheck.  Patient was given instructions and counseling regarding her condition or for health maintenance advice. Please see specific information pulled into the AVS if appropriate.       Kendrick Thompson Jr, MD  03/14/23  12:39 EDT

## 2023-03-21 DIAGNOSIS — J02.0 STREP PHARYNGITIS: Primary | ICD-10-CM

## 2023-03-21 RX ORDER — CEFDINIR 300 MG/1
600 CAPSULE ORAL DAILY
Qty: 10 CAPSULE | Refills: 0 | Status: SHIPPED | OUTPATIENT
Start: 2023-03-21 | End: 2023-03-31

## 2023-03-27 ENCOUNTER — TELEPHONE (OUTPATIENT)
Dept: OBSTETRICS AND GYNECOLOGY | Facility: CLINIC | Age: 28
End: 2023-03-27
Payer: COMMERCIAL

## 2023-03-27 DIAGNOSIS — B37.31 VAGINAL YEAST INFECTION: Primary | ICD-10-CM

## 2023-03-27 RX ORDER — FLUCONAZOLE 150 MG/1
150 TABLET ORAL
Qty: 2 TABLET | Refills: 1 | Status: SHIPPED | OUTPATIENT
Start: 2023-03-27

## 2023-03-27 NOTE — TELEPHONE ENCOUNTER
Pt is requesting diflucan due to developing a yeast infection after being on antibiotics for strep throat, sent in per protocol diflucan 150 mg po today and 71 hrs, with one refill to [facility].

## 2023-03-28 NOTE — PROGRESS NOTES
GYN Visit    CC: Low libido    HPI:   28 y.o. Contraception or HRT: Contraception:  None  Pt has concerns she would like to discuss.          History: PMHx, Meds, Allergies, PSHx, Social Hx, and POBHx all reviewed and updated.  Physical Exam   PHYSICAL EXAM:  /84   Pulse 80   Breastfeeding No   General- NAD, alert and oriented,   Psych-tearful and anxious, good memory      ASSESSMENT AND PLAN:  Diagnoses and all orders for this visit:    1. Low libido (Primary)  Assessment & Plan:  Psychological condition is unchanged.  Regular aerobic exercise.  Medication changes per orders.  Referral to psychiatry.  Psychological condition  will be reassessed in 4 weeks   Patient counseled at length how libido is multifactorial.  Given that the patient has concurrent significant symptoms of depression and anxiety and possible history of unknown diagnosed postpartum mood disorder I feel it would be in her best interest to increase her Zoloft dose.  We will add BuSpar for as needed anxiety.  I also recommend we seek the expertise of psychiatry as patient has a history of depression as a teenager and currently her symptoms are not well controlled.      2. Anxiety  -     busPIRone (BUSPAR) 15 MG tablet; Take 0.5 tablets by mouth 3 (Three) Times a Day As Needed (anxiety).  Dispense: 90 tablet; Refill: 2  -     sertraline (Zoloft) 100 MG tablet; Take 1 tablet by mouth Daily.  Dispense: 90 tablet; Refill: 11    3. Current severe episode of major depressive disorder without psychotic features, unspecified whether recurrent  Assessment & Plan:  Patient's depression is recurrent and is severe without psychosis. Their depression is currently active and the condition is worsening. This will be reassessed in 4 weeks. F/U as described:patient was prescribed an antidepressant medicine and patient referred to Mental Health Specialist   Pt is very tearful and upset.  She states she had a h/o depression as a teenager and there is a  family h/o depression as well.  She currently works full time at a busy job and has 3 small children.  She has good family support and help at home.  She states she noticed a decline in her moods when her oldest child was almost 12 months old.  She doesn't have active SI or HI but she does have moments when she wishes she could escape and no longer exist.  We discussed the possibility of PPMD that has previously been unrecognized and how this can affect future mental health both pregnant and nonpregnant    Orders:  -     Ambulatory Referral to Psychiatry  -     busPIRone (BUSPAR) 15 MG tablet; Take 0.5 tablets by mouth 3 (Three) Times a Day As Needed (anxiety).  Dispense: 90 tablet; Refill: 2  -     sertraline (Zoloft) 100 MG tablet; Take 1 tablet by mouth Daily.  Dispense: 90 tablet; Refill: 11    4. Morbid obesity with BMI of 40.0-44.9, adult  Assessment & Plan:  Patient's (There is no height or weight on file to calculate BMI.) indicates that they are morbidly/severely obese (BMI > 40 or > 35 with obesity - related health condition) with health conditions that include none . Weight is unchanged. BMI  is above average; no BMI management plan is appropriate. We discussed portion control, increasing exercise, consulting a Bariatric surgeon and management of depression/anxiety/stress to control compensatory eating.             Referral/Consult: Psychiatry    Follow Up:  Return in about 6 weeks (around 5/10/2023) for precert Mirena; Mirena placement Preen or De [place].    I spent 40 minutes caring for Purvi on this date of service. This time includes time spent by me in the following activities:preparing for the visit, reviewing tests, obtaining and/or reviewing a separately obtained history, performing a medically appropriate examination and/or evaluation , counseling and educating the patient/family/caregiver, ordering medications, tests, or procedures, referring and communicating with other health care professionals   and independently interpreting results and communicating that information with the patient/family/caregiver I communicated the above assessment and plan items with Dr. Bower who is also involved in this patient's care and initially started her antidepressant medication      Dipti De Leon MD  03/29/2023

## 2023-03-29 ENCOUNTER — TELEPHONE (OUTPATIENT)
Dept: OBSTETRICS AND GYNECOLOGY | Facility: CLINIC | Age: 28
End: 2023-03-29

## 2023-03-29 ENCOUNTER — OFFICE VISIT (OUTPATIENT)
Dept: OBSTETRICS AND GYNECOLOGY | Facility: CLINIC | Age: 28
End: 2023-03-29
Payer: COMMERCIAL

## 2023-03-29 VITALS — HEART RATE: 80 BPM | DIASTOLIC BLOOD PRESSURE: 84 MMHG | SYSTOLIC BLOOD PRESSURE: 124 MMHG

## 2023-03-29 DIAGNOSIS — R68.82 LOW LIBIDO: Primary | ICD-10-CM

## 2023-03-29 DIAGNOSIS — F41.9 ANXIETY: ICD-10-CM

## 2023-03-29 DIAGNOSIS — E66.01 MORBID OBESITY WITH BMI OF 40.0-44.9, ADULT: ICD-10-CM

## 2023-03-29 DIAGNOSIS — F32.2 CURRENT SEVERE EPISODE OF MAJOR DEPRESSIVE DISORDER WITHOUT PSYCHOTIC FEATURES, UNSPECIFIED WHETHER RECURRENT: ICD-10-CM

## 2023-03-29 PROCEDURE — 99215 OFFICE O/P EST HI 40 MIN: CPT | Performed by: OBSTETRICS & GYNECOLOGY

## 2023-03-29 RX ORDER — BUSPIRONE HYDROCHLORIDE 15 MG/1
7.5 TABLET ORAL 3 TIMES DAILY PRN
Qty: 90 TABLET | Refills: 2 | Status: SHIPPED | OUTPATIENT
Start: 2023-03-29

## 2023-03-29 RX ORDER — SERTRALINE HYDROCHLORIDE 100 MG/1
100 TABLET, FILM COATED ORAL DAILY
Qty: 90 TABLET | Refills: 11 | Status: SHIPPED | OUTPATIENT
Start: 2023-03-29 | End: 2023-04-18

## 2023-03-29 NOTE — TELEPHONE ENCOUNTER
Received fax from [facility] wanting clarification on Buspar Rx.  Called [facility] talked to Darlene Pharmacist verified directions told her if insurance would cover 90 day supply it was okay to change.

## 2023-03-31 NOTE — PROGRESS NOTES
IUD Placement    Chief Complaint   Patient presents with   • Contraception       Sex in last 2 weeks:  no .  No sex since , LMP 3/30  U or Bhcg:  Not done  Consent signed: yes  Last PAP date and result: 2022 Neg    Procedure was explained in detail.  She understands the potential risks include, but are not limited to, failure (pregnancy), pain, bleeding, uterine perforation, and infection.  Her questions have been answered.      Subjective:  28 y.o. Patient's last menstrual period was 2023.    Desires mirena IUD.  Had one previously wo problems.    Objective:  /80   Wt 135 kg (298 lb)   LMP 2023   BMI 40.42 kg/m²   General- NAD, alert and oriented, appropriate  Psych- Normal mood, good memory  Abdomen- Soft, non distended, non tender, no masses  External genitalia- Normal, no lesions  Vagina- Normal, no discharge  Cervix- Normal without lesion or discharge.   Uterus- Normal size, shape & consistency.  Non tender, mobile, & no prolapse, Anteverted. Uterus sounded to 8.5cm.      Betadine/Hibiclens x3.  Tenaculum placed on cervix.  Mirena IUD placed without difficulty.    Strings cut 1 1/2cm.      Patient tolerated the procedure well.    Assessment and Plan:  Diagnoses and all orders for this visit:    1. Encounter for IUD insertion (Primary)  -     Levonorgestrel (MIRENA) 20 MCG/DAY IUD intrauterine device 52 each        Counseling:  She was counseled on the use of the IUD.  It provides contraception for 8years (Mirena), 5years (Kyleena/Liletta) or 3 years (Paula) or 10 years (Copper).  Failure is <1%.  It does not protect her from STDs and condoms are recommended.  She has been instructed to check the strings monthly.     .  PRECAUTIONS-- If she has any fevers >101.5F, and abdominal/pelvic pain, or bleeding > 1pad/2hours, she needs to call our office or on call/ER (after hours/weekend).  She understands the potential risk of pelvic inflammatory disease and the potential for an  effect on her future fertility if she does not seek immediate evaluation.  Cramping due to the IUD should be controlled with a OTC reliever/NSAID.  If not, she should call our office.  If she misses a period and has a positive pregnancy test she must immediately seek medical attention due to the risk of ectopic pregnancy which can be life threatening.  She understands removal can sometimes be difficult and can require surgery.    Follow Up:  Return in about 1 month (around 5/4/2023) for IUD check.          Gertrude Bower,   04/04/2023    [facility]  [address] [place] [address]  Dept: [phone]  Dept Fax: [phone]  Loc: [phone]  Loc Fax: [phone]

## 2023-04-04 ENCOUNTER — PROCEDURE VISIT (OUTPATIENT)
Dept: OBSTETRICS AND GYNECOLOGY | Facility: CLINIC | Age: 28
End: 2023-04-04
Payer: COMMERCIAL

## 2023-04-04 VITALS — BODY MASS INDEX: 40.42 KG/M2 | SYSTOLIC BLOOD PRESSURE: 126 MMHG | WEIGHT: 293 LBS | DIASTOLIC BLOOD PRESSURE: 80 MMHG

## 2023-04-04 DIAGNOSIS — Z30.430 ENCOUNTER FOR IUD INSERTION: Primary | ICD-10-CM

## 2023-04-04 PROCEDURE — 58300 INSERT INTRAUTERINE DEVICE: CPT | Performed by: OBSTETRICS & GYNECOLOGY

## 2023-04-06 PROBLEM — F32.2 CURRENT SEVERE EPISODE OF MAJOR DEPRESSIVE DISORDER WITHOUT PSYCHOTIC FEATURES, UNSPECIFIED WHETHER RECURRENT: Status: ACTIVE | Noted: 2023-04-06

## 2023-04-06 PROBLEM — E66.01 MORBID OBESITY WITH BMI OF 40.0-44.9, ADULT: Status: ACTIVE | Noted: 2023-04-06

## 2023-04-06 PROBLEM — Z30.8 ENCOUNTER FOR OTHER CONTRACEPTIVE MANAGEMENT: Status: ACTIVE | Noted: 2023-04-06

## 2023-04-06 NOTE — ASSESSMENT & PLAN NOTE
Pt has previously done well with a Mirena IUD.  She had her last IUD removed in order to attempt conception.  Since it's removal her mood has been worse and she is struggling to lose weight, balance multiple life stressors and deal with fatigue and low libido.  At this time we discussed putting Mirena back in place and prioritizing other aspects of her health, primarily mental health.  As mental health improves, it then becomes easier to focus on lifestyle changes of nutrition, exercise, and weight loss

## 2023-04-06 NOTE — ASSESSMENT & PLAN NOTE
Patient's depression is recurrent and is severe without psychosis. Their depression is currently active and the condition is worsening. This will be reassessed in 4 weeks. F/U as described:patient was prescribed an antidepressant medicine and patient referred to Mental Health Specialist   Pt is very tearful and upset.  She states she had a h/o depression as a teenager and there is a family h/o depression as well.  She currently works full time at a busy job and has 3 small children.  She has good family support and help at home.  She states she noticed a decline in her moods when her oldest child was almost 12 months old.  She doesn't have active SI or HI but she does have moments when she wishes she could escape and no longer exist.  We discussed the possibility of PPMD that has previously been unrecognized and how this can affect future mental health both pregnant and nonpregnant

## 2023-04-06 NOTE — ASSESSMENT & PLAN NOTE
Patient's (There is no height or weight on file to calculate BMI.) indicates that they are morbidly/severely obese (BMI > 40 or > 35 with obesity - related health condition) with health conditions that include none . Weight is unchanged. BMI  is above average; no BMI management plan is appropriate. We discussed portion control, increasing exercise, consulting a Bariatric surgeon and management of depression/anxiety/stress to control compensatory eating.

## 2023-04-06 NOTE — ASSESSMENT & PLAN NOTE
Psychological condition is unchanged.  Regular aerobic exercise.  Medication changes per orders.  Referral to psychiatry.  Psychological condition  will be reassessed in 4 weeks   Patient counseled at length how libido is multifactorial.  Given that the patient has concurrent significant symptoms of depression and anxiety and possible history of unknown diagnosed postpartum mood disorder I feel it would be in her best interest to increase her Zoloft dose.  We will add BuSpar for as needed anxiety.  I also recommend we seek the expertise of psychiatry as patient has a history of depression as a teenager and currently her symptoms are not well controlled.

## 2023-04-18 ENCOUNTER — OFFICE VISIT (OUTPATIENT)
Dept: PSYCHIATRY | Facility: CLINIC | Age: 28
End: 2023-04-18
Payer: COMMERCIAL

## 2023-04-18 VITALS
HEART RATE: 85 BPM | WEIGHT: 292.8 LBS | HEIGHT: 71 IN | SYSTOLIC BLOOD PRESSURE: 139 MMHG | DIASTOLIC BLOOD PRESSURE: 84 MMHG | BODY MASS INDEX: 40.99 KG/M2

## 2023-04-18 DIAGNOSIS — R53.83 OTHER FATIGUE: ICD-10-CM

## 2023-04-18 DIAGNOSIS — F32.2 CURRENT SEVERE EPISODE OF MAJOR DEPRESSIVE DISORDER WITHOUT PSYCHOTIC FEATURES, UNSPECIFIED WHETHER RECURRENT: Primary | ICD-10-CM

## 2023-04-18 DIAGNOSIS — F41.1 GAD (GENERALIZED ANXIETY DISORDER): ICD-10-CM

## 2023-04-18 PROCEDURE — 90792 PSYCH DIAG EVAL W/MED SRVCS: CPT | Performed by: PSYCHIATRY & NEUROLOGY

## 2023-04-18 RX ORDER — BUPROPION HYDROCHLORIDE 300 MG/1
300 TABLET ORAL EVERY MORNING
Qty: 60 TABLET | Refills: 0 | Status: SHIPPED | OUTPATIENT
Start: 2023-04-18

## 2023-04-18 RX ORDER — SERTRALINE HYDROCHLORIDE 100 MG/1
150 TABLET, FILM COATED ORAL DAILY
Qty: 90 TABLET | Refills: 11 | Status: SHIPPED
Start: 2023-04-18

## 2023-04-18 RX ORDER — CHOLECALCIFEROL (VITAMIN D3) 50 MCG
2000 TABLET ORAL DAILY
Qty: 30 TABLET | Refills: 1 | Status: SHIPPED | OUTPATIENT
Start: 2023-04-18

## 2023-04-18 NOTE — TELEPHONE ENCOUNTER
Pt is requesting refills on her vitamin D. She seen  today and he recommended that she stay on it. I have attached the RX.

## 2023-04-18 NOTE — TREATMENT PLAN
Multi-Disciplinary Problems (from Behavioral Health Treatment Plan)    Active Problems     Problem: Anxiety  Start Date: 04/18/23    Problem Details: The patient self-scales this problem as a 7 with 10 being the worst.        Goal Priority Start Date Expected End Date End Date    Patient will develop and implement behavioral and cognitive strategies to reduce anxiety and irrational fears. -- 04/18/23 -- --    Goal Details: Progress toward goal:  Not appropriate to rate progress toward goal since this is the initial treatment plan.        Goal Intervention Frequency Start Date End Date    Help patient explore past emotional issues in relation to present anxiety. PRN 04/18/23 --    Intervention Details: Duration of treatment until until remission of symptoms.        Goal Intervention Frequency Start Date End Date    Help patient develop an awareness of their cognitive and physical responses to anxiety. PRN 04/18/23 --    Intervention Details: Duration of treatment until until remission of symptoms.              Problem: Depression  Start Date: 04/18/23    Problem Details: The patient self-scales this problem as a 6 with 10 being the worst.        Goal Priority Start Date Expected End Date End Date    Patient will demonstrate the ability to initiate new constructive life skills outside of sessions on a consistent basis. -- 04/18/23 -- --    Goal Details: Progress toward goal:  Not appropriate to rate progress toward goal since this is the initial treatment plan.        Goal Intervention Frequency Start Date End Date    Assist patient in setting attainable activities of daily living goals. PRN 04/18/23 --    Goal Intervention Frequency Start Date End Date    Provide education about depression PRN 04/18/23 --    Intervention Details: Duration of treatment until until remission of symptoms.        Goal Intervention Frequency Start Date End Date    Assist patient in developing healthy coping strategies. PRN 04/18/23 --     Intervention Details: Duration of treatment until until remission of symptoms.                           I have discussed and reviewed this treatment plan with the patient.

## 2023-04-18 NOTE — PROGRESS NOTES
"[facility]  Initial Evaluation    Referring Provider:  Dipti De Leon MD  [address] [place] [address]    Chief Complaint: \"My  and I have three children... want to have another one... had my IUD taken out in October 2022... always had me on Zoloft... since I had my IUD taken out, I just haven't been myself... I have no libido, I don't have any energy... vitamin D... seemed to help a little... I saw Dr. De Leon... said I have depression... we need to put an IUD back in... and up it to 100... I'm on edge all the time...\"    History of Present Illness: Purvi Foreman is a 28 y.o. female who presents today for initial evaluation regarding depressive and anxious symptoms that were reportedly exacerbated after removal of her IUD and cessation of Vyvanse that has since been restarted. She presents unaccompanied in no acute distress and engages with me appropriately. Psychotropic regimen with which patient presents is described as partially effective.     History is positive for signs/symptoms suggestive of MDD, YUMI: low mood, low energy, anhedonia, changes in sleep, changes in appetite, guilt, poor concentration, psychomotor changes, consistent and excessive worry across several domains of life that contributes to tension and irritability throughout the day. Denies hx SI, but does report she has impulses to escape, fantasizes at times about absconding. She reports she'd never do this. Salient stress: work stress is constant, she feels little work-life balance. In general, the patient sounds to have a good deal on her plate between raising and caring for three young children, working in a high-volume healthcare setting, and trying to stay on top of responsibilities at home. There is some financial stress as well. Many of these are not directly modifiable, but the patient would benefit from some form of respite, most accessibly through asking for more help " from her family and loved ones. She notes she's been reluctant to do this, doesn't wish to add to anyone else's stress. I have counseled her in this regard and highlighted the importance of addressing what's beneath her symptoms in addition to treating the symptoms with pharmaceuticals. She's amenable to begin therapy, would prefer in-person. Discussed medications and prospect of Genesight testing; I have advised this intervention is generally low-yield and provides little direction with regard to what medications will be effective, but does provide some information with regard to how medications are metabolized by an individual. While metabolic information can be elucidating, it can also work to preclude options that might actually be effective and suggest options that may not be; in the end, prescribing will still be a matter of trial and error and for this reason our practice generally opts out of this intervention.    I have counseled the patient with regard to diagnoses and the recommended treatment regimen as documented below: I will assume prescriptive responsibility for sertraline and buspirone. I will be prescribing bupropion for depression; she has tried this agent at a lower dose and tolerated it, but didn't feel much effect. I'd like to prescribe it in order to expand the neurotransmitter profile of her antidepressant regimen. In addition, we'll titrate sertraline to 150 mg QD (patient has plenty of supply and will begin taking a tablet and a half daily). Patient acknowledges the diagnoses per my rendered interpretation. Patient demonstrates awareness/understanding of viable alternatives for treatment as well as potential risks, benefits, and side effects associated with this regimen and is amenable to proceed in this fashion.     Recommended lifestyle changes: ask for help more often, make respite and independent time a priority in order to strike a more effective work-life balance.    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[employer] (was [employer]; change was 09/2021 and since some of the volume has been more difficult to manage due to systemic issues)  Education: HS diploma, some college  Pertinent developmental history: denies; denies abuse hx, but dad left when patient was 6 YO and she didn't see him again until she was around 9 YO, mom was working three jobs (spent most of her time with her )  Pertinent legal history: denies  Hobbies/interests: played sports (tumbling, gymnastics); does spend a good amount of time outdoors (fishing, on boat), but isn't particularly interested in doing this  Advent: Rastafari  Exercise: 20 minutes every other day  Dietary habits: defer  Sleep hygiene: defer  Social habits: time constraints  Sunlight: no concern for under-exposure  Caffeine intake: no pertinent issues; 1 can of coke daily, 40 oz of tea daily, rarely coffee  Hydration habits: no pertinent issues   history: denies    Social History     Socioeconomic History   • Marital status:    Tobacco Use   • Smoking status: Never   • Smokeless tobacco: Never   Vaping Use   • Vaping Use: Never used   Substance and Sexual Activity   • Alcohol use: Yes     Comment: drinks rarely   • Drug use: Never   • Sexual activity: Yes     Partners: Male     Birth control/protection: I.U.D.     Comment: Mirena      Access to Firearms: no; there are guns at home, but these are locked in a gun safe (patient doesn't know code).    Tobacco use counseling/intervention: N/A, patient does not use tobacco; patient was counseled with regard to risks of tobacco use.    PHQ-9 Depression Screening  PHQ-9 Total Score: 15    Little interest or pleasure in doing things? 1-->several days   Feeling down, depressed, or hopeless? 2-->more than half the days   Trouble falling or staying asleep, or sleeping too much? 3-->nearly every day   Feeling tired or having little energy? 3-->nearly every day   Poor appetite or overeating? 2-->more than half the days   Feeling bad about yourself - or that you are a failure or have let yourself or your family down? 2-->more than half the days   Trouble concentrating on things, such as reading the newspaper or watching television? 1-->several days   Moving or speaking so slowly that other people could have noticed? Or the opposite - being so fidgety or restless that you have been moving around a lot more than usual? 1-->several days   Thoughts that you would be better off dead, or of hurting yourself in some way? 0-->not at all   PHQ-9 Total Score 15     YUMI-7  Feeling nervous, anxious or on edge: More than half the days  Not being able to stop or control worrying: Nearly every day  Worrying too much about different things: Nearly every day  Trouble Relaxing: Nearly every day  Being so restless that it is hard to sit still: More than half the days  Feeling afraid as if something awful might happen: Several days  Becoming easily annoyed or irritable: Nearly every day  YUMI 7 Total Score: 17  If you checked any problems, how difficult have these problems made it for you to do your work, take care of things at home, or get along with other people: Somewhat difficult    Problem List:  Patient Active Problem List   Diagnosis   • Binge eating disorder   • Abnormal thyroid blood test   • YUMI (generalized anxiety  disorder)   • Low libido   • Fatigue   • Irregular menses   • Current severe episode of major depressive disorder without psychotic features, unspecified whether recurrent   • Encounter for other contraceptive management   • Morbid obesity with BMI of 40.0-44.9, adult     Allergy:   Allergies   Allergen Reactions   • Penicillins Swelling and Rash   • Latex Rash      Discontinued Medications:  Medications Discontinued During This Encounter   Medication Reason   • fluconazole (Diflucan) 150 MG tablet    • sertraline (Zoloft) 100 MG tablet      Current Medications:   Current Outpatient Medications   Medication Sig Dispense Refill   • busPIRone (BUSPAR) 15 MG tablet Take 0.5 tablets by mouth 3 (Three) Times a Day As Needed (anxiety). 90 tablet 2   • lisdexamfetamine (VYVANSE) 70 MG capsule Take 1 capsule by mouth Every Morning 30 capsule 0   • sertraline (Zoloft) 100 MG tablet Take 1.5 tablets by mouth Daily. 90 tablet 11   • buPROPion XL (Wellbutrin XL) 300 MG 24 hr tablet Take 1 tablet by mouth Every Morning. 60 tablet 0   • Cholecalciferol (Vitamin D) 50 MCG (2000 UT) tablet Take 2,000 Units by mouth Daily. (Patient not taking: Reported on 4/18/2023) 30 tablet 1   • Prenatal Vit-Fe Sulfate-FA-DHA (Prenatal Vitamin/Min +DHA) 27-0.8-200 MG capsule Take 1 each by mouth Daily. (Patient not taking: Reported on 4/18/2023) 90 capsule 4     No current facility-administered medications for this visit.     Past Medical History:  Past Medical History:   Diagnosis Date   • Anxiety    • Depression    • Foot pain, bilateral    • History of gestational hypertension    • Hypertension     No meds   • Migraine WITH aura    • Seasonal allergies      Past Surgical History:  Past Surgical History:   Procedure Laterality Date   • APPENDECTOMY     • RETAINED PLACENTA REMOVAL      D+C w second tri loss     Family History:   Family History   Problem Relation Age of Onset   • Endometrial cancer Mother         HYPERPLASIA   • Hypertension  Mother    • Hypertension Father    • Diabetes Father    • Esophageal cancer Maternal Grandfather    • Lung cancer Paternal Grandmother    • Stroke Neg Hx    • Breast cancer Neg Hx    • Colon cancer Neg Hx    • Ovarian cancer Neg Hx      Mental Status Exam:   Appearance: well-groomed, sits upright, age-appropriate, above average habitus, tall  Behavior: calm, cooperative, appropriate in demeanor, appropriate eye-contact  Mood/affect: overwhelmed, dysphoric / mood-congruent, but appropriate in both range and amplitude  Speech: within expected variance; appropriate rate, appropriate rhythm, appropriate tone; non-pressured  Thought Process: linear, goal-directed; no FOI or RADHA; abstraction intact  Thought Content: coherent, devoid of overt delusions/perceptual disturbances  SI/HI: denies both SI and HI; exhibits future-orientation, self-advocates appropriately, no regular self-harm, no appreciable intent  Memory: no overt deficits  Orientation: oriented to person/place/time/situation  Concentration: appropriate during interview  Intellectual capacity: presumptively average  Insight: fair by given history/exam  Judgment: appropriate by given history/exam  Psychomotor: fidgets  Gait: WNL    Review of Systems:  Review of Systems   Constitutional: Positive for appetite change. Negative for activity change and unexpected weight change.   HENT: Negative for drooling.    Eyes: Negative for visual disturbance.   Respiratory: Negative for chest tightness and shortness of breath.    Cardiovascular: Negative for chest pain and palpitations.   Gastrointestinal: Negative for abdominal pain, diarrhea and nausea.   Endocrine: Negative for cold intolerance and heat intolerance.   Genitourinary: Negative for difficulty urinating and frequency.   Musculoskeletal: Negative for myalgias and neck stiffness.   Skin: Negative for rash.   Neurological: Negative for dizziness, tremors, seizures and light-headedness.      Vital Signs:   BP  "139/84   Pulse 85   Ht 180.3 cm (71\")   Wt 133 kg (292 lb 12.8 oz)   BMI 40.84 kg/m²      Lab Results:   Office Visit on 03/14/2023   Component Date Value Ref Range Status   • Amphetamine Screen, Urine 03/14/2023 Positive (A)  Negative Final   • AMP INTERNAL CONTROL 03/14/2023 Failed (A)  Passed Final   • Barbiturates Screen, Urine 03/14/2023 Negative  Negative Final   • BARBITURATE INTERNAL CONTROL 03/14/2023 Passed  Passed Final   • Buprenorphine, Screen, Urine 03/14/2023 Negative  Negative Final   • BUPRENORPHINE INTERNAL CONTROL 03/14/2023 Passed  Passed Final   • Benzodiazepine Screen, Urine 03/14/2023 Negative  Negative Final   • BENZODIAZEPINE INTERNAL CONTROL 03/14/2023 Passed  Passed Final   • Cocaine Screen, Urine 03/14/2023 Negative  Negative Final   • COCAINE INTERNAL CONTROL 03/14/2023 Passed  Passed Final   • MDMA (ECSTASY) 03/14/2023 Negative  Negative Final   • MDMA (ECSTASY) INTERNAL CONTROL 03/14/2023 Passed  Passed Final   • Methamphetamine, Ur 03/14/2023 Negative  Negative Final   • METHAMPHETAMINE INTERNAL CONTROL 03/14/2023 Passed  Passed Final   • Methadone Screen, Urine 03/14/2023 Negative  Negative Final   • METHADONE INTERNAL CONTROL 03/14/2023 Passed  Passed Final   • Opiate Screen 03/14/2023 Negative  Negative Final   • OPIATES INTERNAL CONTROL 03/14/2023 Passed  Passed Final   • Oxycodone Screen, Urine 03/14/2023 Negative  Negative Final   • OXYCODONE INTERNAL CONTROL 03/14/2023 Passed  Passed Final   • Phencyclidine (PCP), Urine 03/14/2023 Negative  Negative Final   • PHENCYCLIDINE INTERNAL CONTROL 03/14/2023 Passed  Passed Final   • THC, Screen, Urine 03/14/2023 Negative  Negative Final   • THC INTERNAL CONTROL 03/14/2023 Passed  Passed Final   • Lot Number 03/14/2023 -   Final   • Expiration Date 03/14/2023 -   Final   Office Visit on 01/13/2023   Component Date Value Ref Range Status   • Prolactin 01/13/2023 15.60  4.79 - 23.30 ng/mL Final   • Free T4 01/13/2023 1.20  0.93 - 1.70 " ng/dL Final   • TSH 01/13/2023 1.250  0.270 - 4.200 uIU/mL Final   • WBC 01/13/2023 7.65  3.40 - 10.80 10*3/mm3 Final   • RBC 01/13/2023 4.81  3.77 - 5.28 10*6/mm3 Final   • Hemoglobin 01/13/2023 12.8  12.0 - 15.9 g/dL Final   • Hematocrit 01/13/2023 39.4  34.0 - 46.6 % Final   • MCV 01/13/2023 81.9  79.0 - 97.0 fL Final   • MCH 01/13/2023 26.6  26.6 - 33.0 pg Final   • MCHC 01/13/2023 32.5  31.5 - 35.7 g/dL Final   • RDW 01/13/2023 13.5  12.3 - 15.4 % Final   • RDW-SD 01/13/2023 39.5  37.0 - 54.0 fl Final   • MPV 01/13/2023 10.6  6.0 - 12.0 fL Final   • Platelets 01/13/2023 291  140 - 450 10*3/mm3 Final   • Neutrophil % 01/13/2023 66.9  42.7 - 76.0 % Final   • Lymphocyte % 01/13/2023 25.1  19.6 - 45.3 % Final   • Monocyte % 01/13/2023 4.6 (L)  5.0 - 12.0 % Final   • Eosinophil % 01/13/2023 2.6  0.3 - 6.2 % Final   • Basophil % 01/13/2023 0.5  0.0 - 1.5 % Final   • Immature Grans % 01/13/2023 0.3  0.0 - 0.5 % Final   • Neutrophils, Absolute 01/13/2023 5.12  1.70 - 7.00 10*3/mm3 Final   • Lymphocytes, Absolute 01/13/2023 1.92  0.70 - 3.10 10*3/mm3 Final   • Monocytes, Absolute 01/13/2023 0.35  0.10 - 0.90 10*3/mm3 Final   • Eosinophils, Absolute 01/13/2023 0.20  0.00 - 0.40 10*3/mm3 Final   • Basophils, Absolute 01/13/2023 0.04  0.00 - 0.20 10*3/mm3 Final   • Immature Grans, Absolute 01/13/2023 0.02  0.00 - 0.05 10*3/mm3 Final   • nRBC 01/13/2023 0.0  0.0 - 0.2 /100 WBC Final     EKG Results:  No orders to display     Imaging Results:  No Images in the past 120 days found.    ASSESSMENT AND PLAN:    ICD-10-CM ICD-9-CM   1. Current severe episode of major depressive disorder without psychotic features, unspecified whether recurrent  F32.2 296.23   2. YUMI (generalized anxiety disorder)  F41.1 300.02     28 y.o. female who presents today for initial evaluation regarding depressive and anxious symptoms that were reportedly exacerbated after removal of her IUD and cessation of Vyvanse that has since been restarted. We  have discussed the history and interpreted diagnoses as above as well as the treatment plan below, including potential R/B/SE of the recommended regimen of which the patient demonstrates understanding. Patient is agreeable to call 911 or go to the nearest ER should she become concerned for her own safety and/or the safety of those around her. There are no overt indices of acute shannon/psychosis on evaluation today.     1. Medication regimen: titrate sertraline to 150 mg QD, begin bupropion XL at 300 mg QD, continue buspirone at current dose for the time being; patient is advised not to misuse prescribed medications or to use any exogenous substances that aren't disclosed to this provider as they may interact with the regimen to her detriment.   2. Risk Assessment: protracted risk is moderate, imminent risk is low.  Risk factors include: anxiety disorder, mood disorder, and recent/ongoing psychosocial stressors. Protective factors include: no known family history of suicidality, intact reality testing, no substance use disorder, no access to firearms, no present SI, no stated history of suicide attempts or self-harm, patient's exhibited future-orientation, strong social support, and patient's cooperation with care. Do note that this is subject to change with the [place] of new stressors, treatment non-adherence, use of substances, and/or new medical ails.  3. Monitoring: reviewed labs as populated above; PHQ-9 today is 15/27, YUMI-7 today is 17/21  4. Therapy: amenable, refer to [facility]  5. Follow-up: 6 weeks  6. Communications: N/A    TREATMENT PLAN/GOALS: challenge patterns of living conducive to symptom burden, implement recommended regimen as above with augmentative, intermittent supportive psychotherapy to reduce symptom burden. Patient acknowledged and verbally consented to begin treatment as above. The importance of adherence to the recommended treatment and interval follow-up appointments was  emphasized today. Patient was today advised to limit daily caffeine intake, hydrate appropriately, eat healthy and nutritious foods, engage sleep hygiene measures, engage appropriate exposure to sunlight, engage with hobbies in balance with life necessities, and exercise appropriate to their capacity to do so.     Billing: I have seen the patient today and considered her psychiatric complaints, rendered a diagnosis, and discussed treatment with the patient as above with which she consents.    Parts of this note are electronic transcriptions/translations of spoken language to printed text using the Dragon Dictation system.    Electronically signed by Freddy Calvo MD, 04/18/23, [id number]

## 2023-04-28 ENCOUNTER — PATIENT ROUNDING (BHMG ONLY) (OUTPATIENT)
Dept: PSYCHIATRY | Facility: CLINIC | Age: 28
End: 2023-04-28
Payer: COMMERCIAL

## 2023-04-28 DIAGNOSIS — F50.81 BINGE EATING DISORDER: ICD-10-CM

## 2023-04-28 NOTE — TELEPHONE ENCOUNTER
[facility] is down until further notice:     Control Refill Request:  Medication: Vyvanse   Last Office Visit:  03/14/2023  Next Office Visit: 7/17/2023  Last UDS: 3/14/2023  Last Contract: 05/26/2022     Contacted [facility]- [facility]  Per Pharmacy last filled:    3/31/2023  Qty;30

## 2023-04-28 NOTE — PROGRESS NOTES
April 28, 2023     *VOICEMAIL*    Helnorma, may I speak with Purvi Foreman?    My name is JENIFER      I am  with [facility]  [address] [place] [id number] [phone].    Before we get started may I verify your date of birth? 1995    I am calling to officially welcome you to our practice and ask about your recent visit. Is this a good time to talk?     N/A    Tell me about your visit with us. What things went well?     N/A     We're always looking for ways to make our patients' experiences even better. Do you have recommendations on ways we may improve?      N/A    Overall were you satisfied with your first visit to our practice?     N/A     I appreciate you taking the time to speak with me today. Is there anything else I can do for you?     LVM WITH PT TO WELCOME HER TO OUR PRACTICE AND TO CALL OUR OFFICE BACK WITH ANY QUESTIONS.     Thank you, and have a great day.

## 2023-05-04 NOTE — PROGRESS NOTES
Post IUD Visit      CC:  Scheduled IUD check  Chief Complaint   Patient presents with   • IUD Check       HPI:    28 y.o. LMP: No LMP recorded.     Social History     Substance and Sexual Activity   Sexual Activity Yes   • Partners: Male   • Birth control/protection: I.U.D.    Comment: Mirena 23       Pain/Cramping:  yes, intermittant RLQ cramping, intermittent  Vaginal Bleeding:  yes, only gets heavy after being up and moving. Small gush maroon blood once a day mid morning   Pain w sex: No intercourse d/t no desire  Feels strings easily:  Hasn't tried  Last PAP date and results: 2022 Neg    PHYSICAL EXAM:  /78   Wt 131 kg (289 lb 3.2 oz)   BMI 40.34 kg/m²   General- NAD, alert and oriented, appropriate  Psych- Normal mood, good memory  Abdomen- Soft, non distended, non tender, no masses  External genitalia- Normal, no lesions  Urethra- Normal, no masses, non tender  Vagina- Normal, no atrophy, no discharge, no prolapse  Bladder- Normal, no masses, non tender, no prolapse  Cervix- Normal, no lesions, no discharge, No cervical motion tenderness, IUD strings visable 1.5-2cm  Uterus- Normal size, shape & consistency.  Non tender, mobile, & no prolapse  Adnexa- No mass, non tender    ASSESSMENT AND PLAN:    Diagnoses and all orders for this visit:    1. IUD check up (Primary)        Counseling:  Pt was counseled to perform monthly string checks. Keep track of menses.  RTO if menses <q21d, >7d long, or heavy or if positive pregnancy test.    Continue OTC motrin and/or tylenol PRN cramping    Follow Up:  Return in about 1 year (around 2024) for WWE.          Gertrude Bower,   2023    [facility]  [address] [place] [address] [address] [place] [address]  Dept: [phone]  Dept Fax: [phone]  Loc: [phone]  Loc Fax: [phone]

## 2023-05-05 ENCOUNTER — OFFICE VISIT (OUTPATIENT)
Dept: OBSTETRICS AND GYNECOLOGY | Facility: CLINIC | Age: 28
End: 2023-05-05
Payer: COMMERCIAL

## 2023-05-05 VITALS — SYSTOLIC BLOOD PRESSURE: 124 MMHG | BODY MASS INDEX: 40.34 KG/M2 | DIASTOLIC BLOOD PRESSURE: 78 MMHG | WEIGHT: 289.2 LBS

## 2023-05-05 DIAGNOSIS — Z30.431 IUD CHECK UP: Primary | ICD-10-CM

## 2023-05-05 RX ORDER — LEVONORGESTREL 52 MG/1
1 INTRAUTERINE DEVICE INTRAUTERINE
COMMUNITY

## 2023-05-09 NOTE — PROGRESS NOTES
GYN Visit    CC: Libido    HPI:   28 y.o. Contraception or HRT: Contraception:  Mirena IUD  Pt has no complaints today.          History: PMHx, Meds, Allergies, PSHx, Social Hx, and POBHx all reviewed and updated.    PHYSICAL EXAM:  /80   Pulse 90   Wt 131 kg (288 lb)   Breastfeeding No   BMI 40.17 kg/m²   General- NAD, alert and oriented, appropriate  Psych- Normal mood, good memory  Ext- No edema, no cyanosis    Skin- No lesions, no rashes, no acanthosis nigricans        ASSESSMENT AND PLAN:  Diagnoses and all orders for this visit:    1. Morbid obesity with BMI of 40.0-44.9, adult (Primary)  Assessment & Plan:  Patient's (Body mass index is 40.17 kg/m².) indicates that they are morbidly/severely obese (BMI > 40 or > 35 with obesity - related health condition) with health conditions that include none . Weight is improving with lifestyle modifications. . We discussed portion control, increasing exercise and consulting a Bariatric surgeon.     Orders:  -     Ambulatory Referral to Bariatric Surgery    2. Low libido  Assessment & Plan:  Psychological condition is improving with treatment.  Continue current treatment regimen.  Psychological condition  will be reassessed at the next regular appointment   Patient states as she is improving overall since life stressors her libido is improving as well.            Referral/Consult: Bariatric surgery    Follow Up:  Return for Annual physical.          Ditpi De Leon MD  05/10/2023

## 2023-05-10 ENCOUNTER — OFFICE VISIT (OUTPATIENT)
Dept: OBSTETRICS AND GYNECOLOGY | Facility: CLINIC | Age: 28
End: 2023-05-10
Payer: COMMERCIAL

## 2023-05-10 VITALS
SYSTOLIC BLOOD PRESSURE: 132 MMHG | BODY MASS INDEX: 40.17 KG/M2 | HEART RATE: 90 BPM | DIASTOLIC BLOOD PRESSURE: 80 MMHG | WEIGHT: 288 LBS

## 2023-05-10 DIAGNOSIS — E66.01 MORBID OBESITY WITH BMI OF 40.0-44.9, ADULT: Primary | ICD-10-CM

## 2023-05-10 DIAGNOSIS — R68.82 LOW LIBIDO: ICD-10-CM

## 2023-05-11 NOTE — ASSESSMENT & PLAN NOTE
Psychological condition is improving with treatment.  Continue current treatment regimen.  Psychological condition  will be reassessed at the next regular appointment   Patient states as she is improving overall since life stressors her libido is improving as well.

## 2023-05-11 NOTE — ASSESSMENT & PLAN NOTE
Patient's (Body mass index is 40.17 kg/m².) indicates that they are morbidly/severely obese (BMI > 40 or > 35 with obesity - related health condition) with health conditions that include none . Weight is improving with lifestyle modifications. . We discussed portion control, increasing exercise and consulting a Bariatric surgeon.

## 2023-05-26 ENCOUNTER — PATIENT MESSAGE (OUTPATIENT)
Dept: INTERNAL MEDICINE | Facility: CLINIC | Age: 28
End: 2023-05-26
Payer: COMMERCIAL

## 2023-05-26 NOTE — TELEPHONE ENCOUNTER
From: Purvi Foreman  To: Kendrick Thompson  Sent: 5/26/2023 10:47 AM EDT  Subject: Insomnia?     Good morning. I’ve had several appointments the past couple months between my OB/GYN and psychiatrist with medication changes. I’ve been changed from 25mg to 150mg Zoloft, 300mg Wellbutrin and Buspar 7.5mg up to 3x daily along with taking my 70mg Vyvanse and Vitamin D 50mcg. I don’t always take my Buspar 3x daily as it’s not needed since my Zoloft has been changed and Wellbutrin added. However, I always take my medication first thing in the AM, 7:30 at the latest. Today marks 2 weeks I’ve not been able to fall asleep at a decent time (9:30-10:30.) I feel restless and I don’t go to sleep till usually 2am, but it has been 3:30am twice last week. I’ve also tried taking Tylenol PM and Benadryl (not together) & it does not help. I put my phone down, TV is off and my family is asleep, but I just can’t get over this insomnia. The insomnia is catching up with me and I’m exhausted throughout the day and feel like I can’t make it through the day without napping on my lunch which is an hour.   Not sure if the medication changes are causing this or what, but something has got give cause I cannot keep going like this as I have a family at home to care for after my work day is complete.     Thank you!

## 2023-05-30 ENCOUNTER — LAB (OUTPATIENT)
Dept: OBSTETRICS AND GYNECOLOGY | Facility: CLINIC | Age: 28
End: 2023-05-30

## 2023-05-30 DIAGNOSIS — R30.0 DYSURIA: Primary | ICD-10-CM

## 2023-05-30 LAB
BILIRUB BLD-MCNC: NEGATIVE MG/DL
GLUCOSE UR STRIP-MCNC: NEGATIVE MG/DL
KETONES UR QL: NEGATIVE
LEUKOCYTE EST, POC: ABNORMAL
NITRITE UR-MCNC: NEGATIVE MG/ML
PH UR: 6 [PH] (ref 5–8)
PROT UR STRIP-MCNC: ABNORMAL MG/DL
RBC # UR STRIP: ABNORMAL /UL
SP GR UR: 1.02 (ref 1–1.03)
UROBILINOGEN UR QL: NORMAL

## 2023-05-30 PROCEDURE — 87086 URINE CULTURE/COLONY COUNT: CPT | Performed by: NURSE PRACTITIONER

## 2023-05-30 PROCEDURE — 87186 SC STD MICRODIL/AGAR DIL: CPT | Performed by: NURSE PRACTITIONER

## 2023-06-02 DIAGNOSIS — N39.0 URINARY TRACT INFECTION WITH HEMATURIA, SITE UNSPECIFIED: Primary | ICD-10-CM

## 2023-06-02 DIAGNOSIS — R31.9 URINARY TRACT INFECTION WITH HEMATURIA, SITE UNSPECIFIED: Primary | ICD-10-CM

## 2023-06-02 LAB — BACTERIA SPEC AEROBE CULT: ABNORMAL

## 2023-06-02 RX ORDER — NITROFURANTOIN 25; 75 MG/1; MG/1
100 CAPSULE ORAL 2 TIMES DAILY
Qty: 10 CAPSULE | Refills: 0 | Status: SHIPPED | OUTPATIENT
Start: 2023-06-02

## 2023-06-13 ENCOUNTER — OFFICE VISIT (OUTPATIENT)
Dept: PSYCHIATRY | Facility: CLINIC | Age: 28
End: 2023-06-13
Payer: COMMERCIAL

## 2023-06-13 VITALS
HEART RATE: 100 BPM | BODY MASS INDEX: 39.48 KG/M2 | WEIGHT: 282 LBS | DIASTOLIC BLOOD PRESSURE: 84 MMHG | SYSTOLIC BLOOD PRESSURE: 141 MMHG | HEIGHT: 71 IN

## 2023-06-13 DIAGNOSIS — F33.42 MAJOR DEPRESSION, RECURRENT, FULL REMISSION: Primary | ICD-10-CM

## 2023-06-13 DIAGNOSIS — F41.9 ANXIETY: ICD-10-CM

## 2023-06-13 RX ORDER — SERTRALINE HYDROCHLORIDE 100 MG/1
150 TABLET, FILM COATED ORAL DAILY
Qty: 135 TABLET | Refills: 0 | Status: SHIPPED | OUTPATIENT
Start: 2023-06-13

## 2023-06-13 RX ORDER — BUPROPION HYDROCHLORIDE 300 MG/1
300 TABLET ORAL EVERY MORNING
Qty: 90 TABLET | Refills: 0 | Status: SHIPPED | OUTPATIENT
Start: 2023-06-13

## 2023-06-13 NOTE — PROGRESS NOTES
"[facility]  Follow-up Visit    Chief Complaint: \"My  and I have three children... want to have another one... had my IUD taken out in October 2022... always had me on Zoloft... since I had my IUD taken out, I just haven't been myself... I have no libido, I don't have any energy... vitamin D... seemed to help a little... I saw Dr. De Leon... said I have depression... we need to put an IUD back in... and up it to 100... I'm on edge all the time...\"     History of Present Illness: Purvi Foreman is a 28 y.o. female who presents today for follow-up regarding MDD, YUMI. Last seen: 04/18 at which time sertraline was titrated and bupropion was started; Vyvanse was reduced in the interim to address insomnia, which has improved. She presents unaccompanied in no acute distress and engages with me appropriately. Psychotropic regimen perceived to be effective. Side-effects per given history: denies outside of possible reduction of libido.    Current treatment regimen includes:   - sertraline 150 mg QD  - bupropion  mg QD  - buspirone 7.5 mg TID  - via another provider: Vyvanse 60 mg QD for appetite suppression    Today the patient feels the medications have been of benefit. Mood has been significantly better, she's not feeling on-edge. She's using less buspirone, usually just in the AM unless she feels she needs more. Libido is some better too, but decreased from baseline. She's aware sertraline can have an impact on this issue, but would prefer to maintain current dosing at this time given recent improvements to mood and anxiety. Thought process and content are devoid of overt aberration suggestive of acute shannon/psychosis. The patient denies SI/HI/AVH. There are no overt changes on exam today compared to most recent evaluation.  - contextual changes: less stress overall;  has been helpful around the home; she has been making more time for herself (maybe 15 minutes " at a time, but making this more routine)  - sleep: improving since taper to Vyvanse; getting around 6 hours nightly now  - appetite: reduced (intentional), has lost 10 lb since last visit    I have counseled the patient with regard to diagnoses and the recommended treatment regimen as documented below. Patient acknowledges the diagnoses per my rendered interpretation. Patient demonstrates understanding of potential risks/benefits/side effects associated with this regimen and is amenable to proceed in this fashion.     Psychotherapy  - Time: 10 minutes  - interventions employed: the therapeutic alliance was strengthened to encourage the patient to express their thoughts and feelings freely. Esteem building was enhanced through praise, reassurance, normalizing/challenging, and encouragement as appropriate. Coping skills were enhanced to build distress tolerance skills and emotional regulation. Allowed patient to freely discuss issues without interruption or judgement with unconditional positive regard, active listening skills, and empathy. Provided a safe, confidential environment to facilitate the development of a positive therapeutic relationship and encourage open, honest communication. Assisted patient in processing session content; acknowledged and normalized/addressed, as appropriate, patient’s thoughts, feelings, and concerns by utilizing a person-centered approach in efforts to build appropriate rapport and a positive therapeutic relationship with open and honest communication.   - Diagnoses: see assessment and plan below  - Symptoms: see subjective above  - Goals   - patient: sustain improvements to mood, bolster frustration tolerance, work to implement more time for herself and respite in general   - provider: challenge patterns of living conducive to pathology, strengthen defenses, promote problems solving, restore adaptive functioning and provide symptom relief.  - Treatment plan: continue supportive  psychotherapy in subsequent appointments to provide symptom relief; see assessment and plan below for additional details:   - iteration: 1   - progress: good   - (X)illumination, (X)contextualization, (X)detection, (working)development, (-)elaboration, (-)refinement  - functional status: improved  - mental status exam: as below  - prognosis: good    Psychiatric History:  Diagnoses: anxiety, depression, BED  Outpatient history: denies  Inpatient history: denies  Medication trials: fluoxetine, duloxetine, bupropion (didn't notice a difference; never titrated)  Other treatment modalities: saw a therapist at a young age (around 9 YO, time of parents' divorce)  Presenting regimen: Vyvanse 70 mg QD for appetite suppression, sertraline 100 mg QD, buspirone 7.5 mg TID  Self harm: denies  Suicide attempts: denies     Social History:  Residence: lives in a house with  ( 2018) and her three children (1 YO, 3 YO, 6 YO)  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 (was [employer]; change was 09/2021 and since some of the volume has been more difficult to manage due to systemic issues)  Education: HS diploma, some college  Pertinent developmental history: denies; denies abuse hx, but dad left when patient was 4 YO and she didn't see him again until she was around 9 YO, mom was working three jobs (spent most of her time with her )  Pertinent legal history: denies  Hobbies/interests: played sports (tumbling, gymnastics); does spend a good amount of time outdoors (fishing, on boat), but isn't particularly interested in doing this  Gnosticism: Rastafari  Exercise: 20 minutes every other day  Dietary habits: defer  Sleep hygiene: defer  Social habits: time constraints  Sunlight: no concern for under-exposure  Caffeine intake: no pertinent issues; 1 can of coke daily, 40 oz of tea daily, rarely coffee  Hydration habits: no pertinent issues   history: denies    Social History      Socioeconomic History    Marital status:    Tobacco Use    Smoking status: Never    Smokeless tobacco: Never   Vaping Use    Vaping Use: Never used   Substance and Sexual Activity    Alcohol use: Yes     Comment: drinks rarely    Drug use: Never    Sexual activity: Yes     Partners: Male     Birth control/protection: I.U.D.     Comment: Mirena 4/4/23     Tobacco use counseling/intervention: N/A, patient does not use tobacco; patient has been counseled with regard to risks of tobacco use.    PHQ-9 Depression Screening  PHQ-9 Total Score:      Little interest or pleasure in doing things?     Feeling down, depressed, or hopeless?     Trouble falling or staying asleep, or sleeping too much?     Feeling tired or having little energy?     Poor appetite or overeating?     Feeling bad about yourself - or that you are a failure or have let yourself or your family down?     Trouble concentrating on things, such as reading the newspaper or watching television?     Moving or speaking so slowly that other people could have noticed? Or the opposite - being so fidgety or restless that you have been moving around a lot more than usual?     Thoughts that you would be better off dead, or of hurting yourself in some way?     PHQ-9 Total Score       Change in PHQ-9 since last measure: N/A (15)    YUMI-7       Change in YUMI-7 since last measure: N/A (17)    Problem List:  Patient Active Problem List   Diagnosis    Binge eating disorder    Abnormal thyroid blood test    YUMI (generalized anxiety disorder)    Low libido    Fatigue    Irregular menses    Current severe episode of major depressive disorder without psychotic features, unspecified whether recurrent    Encounter for other contraceptive management    Morbid obesity with BMI of 40.0-44.9, adult     Allergy:   Allergies   Allergen Reactions    Penicillins Swelling and Rash    Latex Rash      Discontinued Medications:  Medications Discontinued During This Encounter    Medication Reason    sertraline (Zoloft) 100 MG tablet Reorder    buPROPion XL (Wellbutrin XL) 300 MG 24 hr tablet Reorder       Current Medications:   Current Outpatient Medications   Medication Sig Dispense Refill    buPROPion XL (Wellbutrin XL) 300 MG 24 hr tablet Take 1 tablet by mouth Every Morning. 90 tablet 0    busPIRone (BUSPAR) 15 MG tablet Take 0.5 tablets by mouth 3 (Three) Times a Day As Needed (anxiety). 90 tablet 2    Cholecalciferol (Vitamin D) 50 MCG (2000 UT) tablet Take 2,000 Units by mouth Daily. 30 tablet 1    Levonorgestrel (Mirena, 52 MG,) 20 MCG/DAY intrauterine device IUD 1 each by Intrauterine route.      lisdexamfetamine (Vyvanse) 60 MG capsule Take 1 capsule by mouth Every Morning 30 capsule 0    nitrofurantoin, macrocrystal-monohydrate, (Macrobid) 100 MG capsule Take 1 capsule by mouth 2 (Two) Times a Day. 10 capsule 0    sertraline (Zoloft) 100 MG tablet Take 1.5 tablets by mouth Daily. 135 tablet 0    Prenatal Vit-Fe Sulfate-FA-DHA (Prenatal Vitamin/Min +DHA) 27-0.8-200 MG capsule Take 1 each by mouth Daily. (Patient not taking: Reported on 6/13/2023) 90 capsule 4     No current facility-administered medications for this visit.     Past Medical History:  Past Medical History:   Diagnosis Date    Anxiety     Depression     Foot pain, bilateral     History of gestational hypertension     Hypertension     No meds    Migraine WITH aura     Seasonal allergies      Past Surgical History:  Past Surgical History:   Procedure Laterality Date    APPENDECTOMY      RETAINED PLACENTA REMOVAL      D+C w second tri loss     Mental Status Exam:   Appearance: well-groomed, sits upright, age-appropriate, above average habitus, tall  Behavior: calm, cooperative, appropriate in demeanor, appropriate eye-contact  Mood/affect: euthymic / mood-congruent, appropriate in both range and amplitude  Speech: within expected variance; appropriate rate, appropriate rhythm, appropriate tone; non-pressured  Thought  "Process: linear, goal-directed; no FOI or RADHA; abstraction intact  Thought Content: coherent, devoid of overt delusions/perceptual disturbances  SI/HI: denies both SI and HI; exhibits future-orientation, self-advocates appropriately, no regular self-harm, no appreciable intent  Memory: no overt deficits  Orientation: oriented to person/place/time/situation  Concentration: appropriate during interview  Intellectual capacity: presumptively average  Insight: fair by given history/exam  Judgment: appropriate by given history/exam  Psychomotor: appropriate  Gait: WNL    Review of Systems:  Review of Systems   Constitutional:  Positive for appetite change. Negative for activity change and unexpected weight change.   HENT:  Negative for drooling.    Eyes:  Negative for visual disturbance.   Respiratory:  Negative for chest tightness and shortness of breath.    Cardiovascular:  Negative for chest pain and palpitations.   Gastrointestinal:  Negative for abdominal pain, diarrhea and nausea.   Endocrine: Negative for cold intolerance and heat intolerance.   Genitourinary:  Negative for difficulty urinating and frequency.   Musculoskeletal:  Negative for myalgias and neck stiffness.   Skin:  Negative for rash.   Neurological:  Negative for dizziness, tremors, seizures and light-headedness.     Vital Signs:   /84   Pulse 100   Ht 180.3 cm (71\")   Wt 128 kg (282 lb)   BMI 39.33 kg/m²      Lab Results:   Lab on 05/30/2023   Component Date Value Ref Range Status    Glucose, UA 05/30/2023 Negative  Negative mg/dL Final    Bilirubin 05/30/2023 Negative  Negative Final    Ketones, UA 05/30/2023 Negative  Negative Final    Specific Gravity  05/30/2023 1.025  1.005 - 1.030 Final    Blood, UA 05/30/2023 3+ (A)  Negative Final    pH, Urine 05/30/2023 6.0  5.0 - 8.0 Final    Protein, POC 05/30/2023 Trace (A)  Negative mg/dL Final    Urobilinogen, UA 05/30/2023 Normal  Normal, 0.2 E.U./dL Final    Leukocytes 05/30/2023 Trace (A)  " Negative Final    Nitrite, UA 05/30/2023 Negative  Negative Final    Urine Culture 05/30/2023 25,000 CFU/mL Enterococcus faecalis (A)   Final   Office Visit on 03/14/2023   Component Date Value Ref Range Status    Amphetamine Screen, Urine 03/14/2023 Positive (A)  Negative Final    AMP INTERNAL CONTROL 03/14/2023 Failed (A)  Passed Final    Barbiturates Screen, Urine 03/14/2023 Negative  Negative Final    BARBITURATE INTERNAL CONTROL 03/14/2023 Passed  Passed Final    Buprenorphine, Screen, Urine 03/14/2023 Negative  Negative Final    BUPRENORPHINE INTERNAL CONTROL 03/14/2023 Passed  Passed Final    Benzodiazepine Screen, Urine 03/14/2023 Negative  Negative Final    BENZODIAZEPINE INTERNAL CONTROL 03/14/2023 Passed  Passed Final    Cocaine Screen, Urine 03/14/2023 Negative  Negative Final    COCAINE INTERNAL CONTROL 03/14/2023 Passed  Passed Final    MDMA (ECSTASY) 03/14/2023 Negative  Negative Final    MDMA (ECSTASY) INTERNAL CONTROL 03/14/2023 Passed  Passed Final    Methamphetamine, Ur 03/14/2023 Negative  Negative Final    METHAMPHETAMINE INTERNAL CONTROL 03/14/2023 Passed  Passed Final    Methadone Screen, Urine 03/14/2023 Negative  Negative Final    METHADONE INTERNAL CONTROL 03/14/2023 Passed  Passed Final    Opiate Screen 03/14/2023 Negative  Negative Final    OPIATES INTERNAL CONTROL 03/14/2023 Passed  Passed Final    Oxycodone Screen, Urine 03/14/2023 Negative  Negative Final    OXYCODONE INTERNAL CONTROL 03/14/2023 Passed  Passed Final    Phencyclidine (PCP), Urine 03/14/2023 Negative  Negative Final    PHENCYCLIDINE INTERNAL CONTROL 03/14/2023 Passed  Passed Final    THC, Screen, Urine 03/14/2023 Negative  Negative Final    THC INTERNAL CONTROL 03/14/2023 Passed  Passed Final    Lot Number 03/14/2023 -   Final    Expiration Date 03/14/2023 -   Final   Office Visit on 01/13/2023   Component Date Value Ref Range Status    Prolactin 01/13/2023 15.60  4.79 - 23.30 ng/mL Final    Free T4 01/13/2023 1.20  0.93  - 1.70 ng/dL Final    TSH 01/13/2023 1.250  0.270 - 4.200 uIU/mL Final    WBC 01/13/2023 7.65  3.40 - 10.80 10*3/mm3 Final    RBC 01/13/2023 4.81  3.77 - 5.28 10*6/mm3 Final    Hemoglobin 01/13/2023 12.8  12.0 - 15.9 g/dL Final    Hematocrit 01/13/2023 39.4  34.0 - 46.6 % Final    MCV 01/13/2023 81.9  79.0 - 97.0 fL Final    MCH 01/13/2023 26.6  26.6 - 33.0 pg Final    MCHC 01/13/2023 32.5  31.5 - 35.7 g/dL Final    RDW 01/13/2023 13.5  12.3 - 15.4 % Final    RDW-SD 01/13/2023 39.5  37.0 - 54.0 fl Final    MPV 01/13/2023 10.6  6.0 - 12.0 fL Final    Platelets 01/13/2023 291  140 - 450 10*3/mm3 Final    Neutrophil % 01/13/2023 66.9  42.7 - 76.0 % Final    Lymphocyte % 01/13/2023 25.1  19.6 - 45.3 % Final    Monocyte % 01/13/2023 4.6 (L)  5.0 - 12.0 % Final    Eosinophil % 01/13/2023 2.6  0.3 - 6.2 % Final    Basophil % 01/13/2023 0.5  0.0 - 1.5 % Final    Immature Grans % 01/13/2023 0.3  0.0 - 0.5 % Final    Neutrophils, Absolute 01/13/2023 5.12  1.70 - 7.00 10*3/mm3 Final    Lymphocytes, Absolute 01/13/2023 1.92  0.70 - 3.10 10*3/mm3 Final    Monocytes, Absolute 01/13/2023 0.35  0.10 - 0.90 10*3/mm3 Final    Eosinophils, Absolute 01/13/2023 0.20  0.00 - 0.40 10*3/mm3 Final    Basophils, Absolute 01/13/2023 0.04  0.00 - 0.20 10*3/mm3 Final    Immature Grans, Absolute 01/13/2023 0.02  0.00 - 0.05 10*3/mm3 Final    nRBC 01/13/2023 0.0  0.0 - 0.2 /100 WBC Final     EKG Results:  No orders to display     Imaging Results:  No Images in the past 120 days found.    ASSESSMENT AND PLAN:    ICD-10-CM ICD-9-CM   1. Major depression, recurrent, full remission  F33.42 296.36   2. Anxiety  F41.9 300.00     28 y.o. female who presents today for follow-up regarding MDD, YUMI. We have discussed the interval history and the treatment plan below, including potential R/B/SE of the recommended regimen of which the patient demonstrates understanding. Patient is agreeable to call 911 or go to the nearest ER should she become concerned for  her own safety and/or the safety of those around her. There are no overt indices of acute shannon/psychosis on exam today.     Medication regimen: continue sertraline, bupropion XL, and buspirone; patient is advised not to misuse prescribed medications or to use them with any exogenous substances that aren't disclosed to this provider as they may interact with the regimen to the patient's detriment.   Risk Assessment: protracted risk is moderate, imminent risk is low - no interval change. Do note that this is subject to change with the Sabianism of new stressors, treatment non-adherence, use of substances, and/or new medical ails.   Monitoring: reviewed labs as populated above  Therapy: referred  Follow-up: 3 months  Communications: N/A    TREATMENT PLAN/GOALS: challenge patterns of living conducive to symptom burden, implement recommended regimen as above with augmentative, intermittent supportive psychotherapy to reduce symptom burden. Patient acknowledged and verbally consented to continue treatment. The importance of adherence to the recommended treatment and interval follow-up appointments was again emphasized today: patient has good treatment adherence per given history. Patient was today reminded to limit daily caffeine intake, hydrate appropriately, eat healthy and nutritious foods, engage sleep hygiene measures, engage appropriate exposure to sunlight, engage with hobbies in balance with life necessities, and exercise appropriate to their capacity to do so.     Billing: This encounter is of moderate complexity based on number/complexity of problems addressed today and risk of complications/morbidity: 2+ stable chronic illnesses and prescription management.    Parts of this note are electronic transcriptions/translations of spoken language to printed text using the Dragon Dictation system.    Electronically signed by Freddy Calvo MD, 06/13/23, [id number]

## 2023-08-04 ENCOUNTER — OFFICE VISIT (OUTPATIENT)
Dept: INTERNAL MEDICINE | Facility: CLINIC | Age: 28
End: 2023-08-04
Payer: COMMERCIAL

## 2023-08-04 VITALS
WEIGHT: 281 LBS | OXYGEN SATURATION: 98 % | TEMPERATURE: 98.2 F | SYSTOLIC BLOOD PRESSURE: 132 MMHG | HEART RATE: 101 BPM | DIASTOLIC BLOOD PRESSURE: 83 MMHG | HEIGHT: 71 IN | BODY MASS INDEX: 39.34 KG/M2

## 2023-08-04 DIAGNOSIS — H66.002 NON-RECURRENT ACUTE SUPPURATIVE OTITIS MEDIA OF LEFT EAR WITHOUT SPONTANEOUS RUPTURE OF TYMPANIC MEMBRANE: Primary | ICD-10-CM

## 2023-08-04 PROCEDURE — 99213 OFFICE O/P EST LOW 20 MIN: CPT

## 2023-08-04 RX ORDER — DOXYCYCLINE HYCLATE 100 MG/1
100 CAPSULE ORAL 2 TIMES DAILY
Qty: 10 CAPSULE | Refills: 0 | Status: SHIPPED | OUTPATIENT
Start: 2023-08-04 | End: 2023-08-09

## 2023-08-04 NOTE — PROGRESS NOTES
"Chief Complaint  Earache (Just the left ear is hurting)    Subjective          Purvi Foreman presents to [facility]  History of Present Illness    Purvi is here for left ear pain. She said she woke up this morning with it really bothering her. She denies any other symptoms at this time.     Current Outpatient Medications   Medication Instructions    buPROPion XL (WELLBUTRIN XL) 300 mg, Oral, Every Morning    busPIRone (BUSPAR) 7.5 mg, Oral, 3 Times Daily PRN    doxycycline (VIBRAMYCIN) 100 mg, Oral, 2 Times Daily    Levonorgestrel (Mirena, 52 MG,) 20 MCG/DAY intrauterine device IUD 1 each, Intrauterine    lisdexamfetamine (VYVANSE) 70 mg, Oral, Every Morning    sertraline (ZOLOFT) 150 mg, Oral, Daily    traZODone (DESYREL) 50 mg, Oral, Nightly PRN    Vitamin D 2,000 Units, Oral, Daily       The following portions of the patient's history were reviewed and updated as appropriate: allergies, current medications, past family history, past medical history, past social history, past surgical history, and problem list.    Objective   Vital Signs:   /83 (BP Location: Left arm, Patient Position: Sitting, Cuff Size: Adult)   Pulse 101   Temp 98.2 øF (36.8 øC) (Temporal)   Ht 180.3 cm (71\")   Wt 127 kg (281 lb)   SpO2 98%   BMI 39.19 kg/mý     BP Readings from Last 3 Encounters:   08/04/23 132/83   07/11/23 120/81   06/13/23 141/84     Wt Readings from Last 3 Encounters:   08/04/23 127 kg (281 lb)   07/11/23 128 kg (282 lb)   06/13/23 128 kg (282 lb)           Physical Exam  Vitals reviewed.   Constitutional:       Appearance: Normal appearance. She is well-developed.   HENT:      Head: Normocephalic and atraumatic.      Right Ear: A middle ear effusion is present.      Left Ear: Tenderness present. There is mastoid tenderness. Tympanic membrane is erythematous and bulging.      Mouth/Throat:      Pharynx: No oropharyngeal exudate.   Eyes:      " Conjunctiva/sclera: Conjunctivae normal.      Pupils: Pupils are equal, round, and reactive to light.   Neck:      Thyroid: No thyromegaly or thyroid tenderness.   Cardiovascular:      Rate and Rhythm: Normal rate and regular rhythm.      Heart sounds: No murmur heard.    No friction rub. No gallop.   Pulmonary:      Effort: Pulmonary effort is normal.      Breath sounds: Normal breath sounds. No wheezing or rhonchi.   Lymphadenopathy:      Cervical: No cervical adenopathy.   Skin:     General: Skin is warm and dry.   Neurological:      Mental Status: She is alert and oriented to person, place, and time.   Psychiatric:         Mood and Affect: Affect normal.        Result Review :   The following data was reviewed by: RHIANNA Suarez on 08/04/2023:  Common labs          1/13/2023    10:21   Common Labs   WBC 7.65    Hemoglobin 12.8    Hematocrit 39.4    Platelets 291             Lab Results   Component Value Date    SARSANTIGEN Not Detected 08/10/2022    COVID19 Not Detected 08/10/2022    RAPFLUA Negative 08/08/2022    RAPFLUB Negative 08/08/2022    FLUAAG Not Detected 08/10/2022    FLUBAG Not Detected 08/10/2022    RAPSCRN Negative 08/10/2022    INR 0.95 (L) 01/16/2019    BILIRUBINUR Negative 05/30/2023       Procedures        Assessment and Plan    Diagnoses and all orders for this visit:    1. Non-recurrent acute suppurative otitis media of left ear without spontaneous rupture of tympanic membrane (Primary)  Comments:  Will treat with antibiotics. Recommended alternating tylenol and motrin for pain relief. Discussed follow up if symptoms worsen or do not improve  Orders:  -     doxycycline (VIBRAMYCIN) 100 MG capsule; Take 1 capsule by mouth 2 (Two) Times a Day for 5 days.  Dispense: 10 capsule; Refill: 0          There are no discontinued medications.       Follow Up   No follow-ups on file.  Patient was given instructions and counseling regarding her condition or for health maintenance advice. Please see  specific information pulled into the AVS if appropriate.       RHIANNA Suarez  08/07/23  10:18 EDT

## 2023-08-04 NOTE — LETTER
August 4, 2023     Patient: Purvi Foreman   YOB: 1995   Date of Visit: 8/4/2023       To Whom It May Concern:    It is my medical opinion that Purvi Foreman may return to work TODAY           Sincerely,        RHIANNA Suarez

## 2023-08-16 ENCOUNTER — PATIENT MESSAGE (OUTPATIENT)
Dept: INTERNAL MEDICINE | Facility: CLINIC | Age: 28
End: 2023-08-16
Payer: COMMERCIAL

## 2023-08-16 DIAGNOSIS — E66.01 MORBID OBESITY WITH BMI OF 40.0-44.9, ADULT: ICD-10-CM

## 2023-08-16 NOTE — TELEPHONE ENCOUNTER
From: Purvi Foreman  To: Kendrick Thompson  Sent: 8/16/2023 8:25 AM EDT  Subject: Refill    Good morning! I took my last pill of my Vyvanse prescription this am, could you send in a refill, please?    Thank you!

## 2023-08-16 NOTE — TELEPHONE ENCOUNTER
CONTROLLED MEDICATION. PLEASE ADVISE.     Vyvanse 70mg    Last Filled: 7/11/23 #30-no refills   Last Visit: 8/4/23  Next Appt: 11/21/23  Last UDS: 7/11/23  Last Controlled Contract: 7/11/23

## 2023-09-05 ENCOUNTER — TELEPHONE (OUTPATIENT)
Dept: PSYCHIATRY | Facility: CLINIC | Age: 28
End: 2023-09-05
Payer: COMMERCIAL

## 2023-09-05 NOTE — TELEPHONE ENCOUNTER
Patient was referred out to Psychiatric on 04/18/2023. Called patient to follow up on referral order, patient states she don't feel that she needs therapy at this time, that her medication is working. Patient is asking that referral be closed, closing out referral

## 2023-09-06 ENCOUNTER — PATIENT MESSAGE (OUTPATIENT)
Dept: INTERNAL MEDICINE | Facility: CLINIC | Age: 28
End: 2023-09-06
Payer: COMMERCIAL

## 2023-09-07 NOTE — TELEPHONE ENCOUNTER
From: Purvi Foreman  To: Kendrick Thompson  Sent: 9/6/2023 9:50 AM EDT  Subject: Vyvanse    I’m sorry for the hassle, in advance. Since 70mg Vyvanse is out of stock still, but [facility] has 30mg & 40mg capsules in stock(last time I checked.) Would you be willing to call in those for it to equal 70mg that I can take daily?     Again, I apologize for the hassle.   Thank you!!

## 2023-10-02 DIAGNOSIS — F33.42 MAJOR DEPRESSION, RECURRENT, FULL REMISSION: ICD-10-CM

## 2023-10-02 RX ORDER — SERTRALINE HYDROCHLORIDE 100 MG/1
150 TABLET, FILM COATED ORAL DAILY
Qty: 135 TABLET | Refills: 0 | Status: SHIPPED | OUTPATIENT
Start: 2023-10-02

## 2023-10-10 ENCOUNTER — OFFICE VISIT (OUTPATIENT)
Dept: PSYCHIATRY | Facility: CLINIC | Age: 28
End: 2023-10-10
Payer: COMMERCIAL

## 2023-10-10 VITALS
BODY MASS INDEX: 40.65 KG/M2 | HEART RATE: 112 BPM | WEIGHT: 290.4 LBS | HEIGHT: 71 IN | SYSTOLIC BLOOD PRESSURE: 131 MMHG | DIASTOLIC BLOOD PRESSURE: 83 MMHG

## 2023-10-10 DIAGNOSIS — F33.0 MAJOR DEPRESSIVE DISORDER, RECURRENT, MILD: ICD-10-CM

## 2023-10-10 DIAGNOSIS — F50.81 BINGE EATING DISORDER: ICD-10-CM

## 2023-10-10 DIAGNOSIS — F41.1 GAD (GENERALIZED ANXIETY DISORDER): Primary | ICD-10-CM

## 2023-10-10 PROBLEM — F33.42 MAJOR DEPRESSION, RECURRENT, FULL REMISSION: Status: ACTIVE | Noted: 2023-10-10

## 2023-10-10 RX ORDER — BUPROPION HYDROCHLORIDE 300 MG/1
300 TABLET ORAL EVERY MORNING
Qty: 90 TABLET | Refills: 0 | Status: SHIPPED | OUTPATIENT
Start: 2023-10-10

## 2023-10-10 RX ORDER — CLONIDINE HYDROCHLORIDE 0.1 MG/1
0.1 TABLET ORAL 2 TIMES DAILY PRN
Qty: 180 TABLET | Refills: 0 | Status: SHIPPED | OUTPATIENT
Start: 2023-10-10

## 2023-10-10 RX ORDER — BUSPIRONE HYDROCHLORIDE 10 MG/1
10 TABLET ORAL 3 TIMES DAILY
Qty: 270 TABLET | Refills: 0 | Status: SHIPPED | OUTPATIENT
Start: 2023-10-10

## 2023-10-10 RX ORDER — DOXYCYCLINE HYCLATE 100 MG/1
1 CAPSULE ORAL EVERY 12 HOURS SCHEDULED
COMMUNITY
Start: 2023-08-18 | End: 2023-10-10

## 2023-10-10 NOTE — PROGRESS NOTES
"[facility]  Follow-up Visit    Chief Complaint: \"My  and I have three children... want to have another one... had my IUD taken out in October 2022... always had me on Zoloft... since I had my IUD taken out, I just haven't been myself... I have no libido, I don't have any energy... vitamin D... seemed to help a little... I saw Dr. De Leon... said I have depression... we need to put an IUD back in... and up it to 100... I'm on edge all the time...\"     History of Present Illness: Purvi Foreman is a 28 y.o. female who presents today for follow-up regarding MDD, YUMI. Last seen: 06/13 at which time no changes were made to her regimen. She presents unaccompanied in no acute distress and engages with me appropriately. Psychotropic regimen perceived to be effective. Side-effects per given history: denies outside of possible reduction of libido.    Current treatment regimen includes:   - sertraline 150 mg QD  - bupropion  mg QD  - buspirone 7.5 mg TID (hasn't been taking this)  - via another provider: Vyvanse 70 mg QD for appetite suppression (30+40 given issues with shortages), trazodone 50 mg HS PRN insomnia    Today she reports she's been feeling more on edge since running out of her sertraline over a week ago. She's stopped taking buspirone as she didn't feel like this was doing much for her; she was expecting PRN-type results, I have advised this is more for baseline anxiety reduction in the background and doesn't typically work effectively as a PRN agent. She's had some issues with anxiety and depression given stress and intermittent non-adherence to her regimen due to issues with both access and miscommunications possible on behalf of our office (she thought she had to be seen prior to refills being sent). Thought process and content are devoid of overt aberration suggestive of acute shannon/psychosis. The patient denies SI/HI/AVH. There are no overt changes on " exam today compared to most recent evaluation.  - contextual changes: work has been more of a stress lately (working 10 hour shifts 5 days weekly), has been having to work weekends without days off; having to coordinate healthcare for her daughter whom will require surgery in January for dental work; her mom has been ill recently and has been less available to sit for her three children  - sleep: improved with trazodone  - appetite: reduced (intentional), struggles with increased appetite when without access to Vyvanse    I have counseled the patient with regard to diagnoses and the recommended treatment regimen as documented below: I will begin clonidine for anxiety/irritability; I have advised this agent has propensity to diminish blood pressure and may result in dizziness, sedation. Patient acknowledges the diagnoses per my rendered interpretation. Patient demonstrates understanding of potential risks/benefits/side effects associated with this regimen and is amenable to proceed in this fashion.     Psychotherapy  - Time: 18 minutes  - interventions employed: the therapeutic alliance was strengthened to encourage the patient to express their thoughts and feelings freely. Esteem building was enhanced through praise, reassurance, normalizing/challenging, and encouragement as appropriate. Coping skills were enhanced to build distress tolerance skills and emotional regulation. Allowed patient to freely discuss issues without interruption or judgement with unconditional positive regard, active listening skills, and empathy. Provided a safe, confidential environment to facilitate the development of a positive therapeutic relationship and encourage open, honest communication. Assisted patient in processing session content; acknowledged and normalized/addressed, as appropriate, patient's thoughts, feelings, and concerns by utilizing a person-centered approach in efforts to build appropriate rapport and a positive  therapeutic relationship with open and honest communication.   - Diagnoses: see assessment and plan below  - Symptoms: see subjective above  - Goals   - patient: sustain improvements to mood, bolster frustration tolerance, work to implement more time for herself and respite in general   - provider: challenge patterns of living conducive to pathology, strengthen defenses, promote problems solving, restore adaptive functioning and provide symptom relief.  - Treatment plan: continue supportive psychotherapy in subsequent appointments to provide symptom relief; see assessment and plan below for additional details:   - iteration: 1   - progress: good   - (X)illumination, (X)contextualization, (X)detection, (working)development, (-)elaboration, (-)refinement  - functional status: improved  - mental status exam: as below  - prognosis: good    Psychiatric History:  Diagnoses: anxiety, depression, BED  Outpatient history: denies  Inpatient history: denies  Medication trials: fluoxetine, duloxetine  Other treatment modalities: saw a therapist at a young age (around 9 YO, time of parents' divorce)  Presenting regimen: Vyvanse 70 mg QD for appetite suppression, sertraline 100 mg QD, buspirone 7.5 mg TID  Self harm: denies  Suicide attempts: denies     Social History:  Residence: lives in a house with  ( 2018) and her three children (1 YO, 3 YO, 4 YO)  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[employer] (was [employer]; change was 09/2021 and since some of the volume has been more difficult to manage due to systemic issues)  Education: HS diploma, some college  Pertinent developmental history: denies; denies abuse hx, but dad left when patient was 6 YO and she didn't see him again until she was around 9 YO, mom was working three jobs (spent most of her time with her )  Pertinent legal history: denies  Hobbies/interests: played sports (tumbling, gymnastics); does spend a good amount of  time outdoors (fishing, on boat), but isn't particularly interested in doing this  Sabianist: Jewish  Exercise: 20 minutes every other day  Dietary habits: defer  Sleep hygiene: defer  Social habits: time constraints  Sunlight: no concern for under-exposure  Caffeine intake: no pertinent issues; 1 can of coke daily, 40 oz of tea daily, rarely coffee  Hydration habits: no pertinent issues   history: denies    Social History     Socioeconomic History    Marital status:    Tobacco Use    Smoking status: Never    Smokeless tobacco: Never   Vaping Use    Vaping Use: Never used   Substance and Sexual Activity    Alcohol use: Yes     Comment: drinks rarely    Drug use: Never    Sexual activity: Yes     Partners: Male     Birth control/protection: I.U.D.     Comment: Mirena 4/4/23     Tobacco use counseling/intervention: N/A, patient does not use tobacco; patient has been counseled with regard to risks of tobacco use.    PHQ-9 Depression Screening  PHQ-9 Total Score:      Little interest or pleasure in doing things?     Feeling down, depressed, or hopeless?     Trouble falling or staying asleep, or sleeping too much?     Feeling tired or having little energy?     Poor appetite or overeating?     Feeling bad about yourself - or that you are a failure or have let yourself or your family down?     Trouble concentrating on things, such as reading the newspaper or watching television?     Moving or speaking so slowly that other people could have noticed? Or the opposite - being so fidgety or restless that you have been moving around a lot more than usual?     Thoughts that you would be better off dead, or of hurting yourself in some way?     PHQ-9 Total Score       Change in PHQ-9 since last measure: N/A (15)    YUMI-7       Change in YUMI-7 since last measure: N/A (17)    Problem List:  Patient Active Problem List   Diagnosis    Binge eating disorder    Abnormal thyroid blood test    YUMI (generalized anxiety  disorder)    Low libido    Fatigue    Irregular menses    Current severe episode of major depressive disorder without psychotic features, unspecified whether recurrent    Encounter for other contraceptive management    Morbid obesity with BMI of 40.0-44.9, adult    Major depression, recurrent, full remission     Allergy:   Allergies   Allergen Reactions    Penicillins Swelling and Rash    Latex Rash      Discontinued Medications:  Medications Discontinued During This Encounter   Medication Reason    doxycycline (VIBRAMYCIN) 100 MG capsule     busPIRone (BUSPAR) 15 MG tablet Reorder    buPROPion XL (Wellbutrin XL) 300 MG 24 hr tablet Reorder     Current Medications:   Current Outpatient Medications   Medication Sig Dispense Refill    buPROPion XL (Wellbutrin XL) 300 MG 24 hr tablet Take 1 tablet by mouth Every Morning. 90 tablet 0    busPIRone (BUSPAR) 10 MG tablet Take 1 tablet by mouth 3 (Three) Times a Day. 270 tablet 0    Cholecalciferol (Vitamin D) 50 MCG (2000 UT) tablet Take 2,000 Units by mouth Daily. 30 tablet 5    Levonorgestrel (Mirena, 52 MG,) 20 MCG/DAY intrauterine device IUD 1 each by Intrauterine route.      lisdexamfetamine (Vyvanse) 30 MG capsule Take 1 capsule by mouth Every Morning 30 capsule 0    lisdexamfetamine (Vyvanse) 40 MG capsule Take 1 capsule by mouth Every Morning 30 capsule 0    lisdexamfetamine (Vyvanse) 70 MG capsule Take 1 capsule by mouth Every Morning 30 capsule 0    sertraline (Zoloft) 100 MG tablet Take 1.5 tablets by mouth Daily. 135 tablet 0    traZODone (DESYREL) 50 MG tablet Take 1 tablet by mouth At Night As Needed for Sleep. 30 tablet 0    cloNIDine (Catapres) 0.1 MG tablet Take 1 tablet by mouth 2 (Two) Times a Day As Needed (anxiety/irritability). 180 tablet 0    levothyroxine (Synthroid) 25 MCG tablet Take 1 tablet by mouth Every Morning. 90 tablet 0     No current facility-administered medications for this visit.     Past Medical History:  Past Medical History:    Diagnosis Date    Anxiety     Depression     Foot pain, bilateral     History of gestational hypertension     Hypertension     No meds    Migraine WITH aura     Seasonal allergies      Past Surgical History:  Past Surgical History:   Procedure Laterality Date    APPENDECTOMY      RETAINED PLACENTA REMOVAL      D+C w second tri loss     Mental Status Exam:   Appearance: well-groomed, sits upright, age-appropriate, above average habitus, tall  Behavior: calm, cooperative, appropriate in demeanor, appropriate eye-contact  Mood/affect: euthymic / mood-congruent, appropriate in both range and amplitude  Speech: within expected variance; appropriate rate, appropriate rhythm, appropriate tone; non-pressured  Thought Process: linear, goal-directed; no FOI or RADHA; abstraction intact  Thought Content: coherent, devoid of overt delusions/perceptual disturbances  SI/HI: denies both SI and HI; exhibits future-orientation, self-advocates appropriately, no regular self-harm, no appreciable intent  Memory: no overt deficits  Orientation: oriented to person/place/time/situation  Concentration: appropriate during interview  Intellectual capacity: presumptively average  Insight: fair by given history/exam  Judgment: appropriate by given history/exam  Psychomotor: appropriate  Gait: WNL    Review of Systems:  Review of Systems   Constitutional:  Negative for activity change, appetite change and unexpected weight change.   HENT:  Negative for drooling.    Eyes:  Negative for visual disturbance.   Respiratory:  Negative for chest tightness and shortness of breath.    Cardiovascular:  Negative for chest pain and palpitations.   Gastrointestinal:  Negative for abdominal pain, diarrhea and nausea.   Endocrine: Negative for cold intolerance and heat intolerance.   Genitourinary:  Negative for difficulty urinating and frequency.   Musculoskeletal:  Negative for myalgias and neck stiffness.   Skin:  Negative for rash.   Neurological:   "Negative for dizziness, tremors, seizures and light-headedness.     Vital Signs:   /83   Pulse 112   Ht 180.3 cm (71\")   Wt 132 kg (290 lb 6.4 oz)   BMI 40.50 kg/mý      Lab Results:   Office Visit on 07/11/2023   Component Date Value Ref Range Status    Amphetamine Screen, Urine 07/11/2023 Positive (A)  Negative Final    AMP INTERNAL CONTROL 07/11/2023 Passed  Passed Final    Barbiturates Screen, Urine 07/11/2023 Negative  Negative Final    BARBITURATE INTERNAL CONTROL 07/11/2023 Passed  Passed Final    Buprenorphine, Screen, Urine 07/11/2023 Negative  Negative Final    BUPRENORPHINE INTERNAL CONTROL 07/11/2023 Passed  Passed Final    Benzodiazepine Screen, Urine 07/11/2023 Negative  Negative Final    BENZODIAZEPINE INTERNAL CONTROL 07/11/2023 Passed  Passed Final    Cocaine Screen, Urine 07/11/2023 Negative  Negative Final    COCAINE INTERNAL CONTROL 07/11/2023 Passed  Passed Final    MDMA (ECSTASY) 07/11/2023 Negative  Negative Final    MDMA (ECSTASY) INTERNAL CONTROL 07/11/2023 Passed  Passed Final    Methamphetamine, Ur 07/11/2023 Negative  Negative Final    METHAMPHETAMINE INTERNAL CONTROL 07/11/2023 Passed  Passed Final    Methadone Screen, Urine 07/11/2023 Negative  Negative Final    METHADONE INTERNAL CONTROL 07/11/2023 Passed  Passed Final    Opiate Screen 07/11/2023 Negative  Negative Final    OPIATES INTERNAL CONTROL 07/11/2023 Passed  Passed Final    Oxycodone Screen, Urine 07/11/2023 Negative  Negative Final    OXYCODONE INTERNAL CONTROL 07/11/2023 Passed  Passed Final    Phencyclidine (PCP), Urine 07/11/2023 Negative  Negative Final    PHENCYCLIDINE INTERNAL CONTROL 07/11/2023 Passed  Passed Final    THC, Screen, Urine 07/11/2023 Negative  Negative Final    THC INTERNAL CONTROL 07/11/2023 Passed  Passed Final    Lot Number 07/11/2023 [id number]   Final    Expiration Date 07/11/2023 10/18/2024   Final   Lab on 05/30/2023   Component Date Value Ref Range Status    Glucose, UA 05/30/2023 " Negative  Negative mg/dL Final    Bilirubin 05/30/2023 Negative  Negative Final    Ketones, UA 05/30/2023 Negative  Negative Final    Specific Gravity  05/30/2023 1.025  1.005 - 1.030 Final    Blood, UA 05/30/2023 3+ (A)  Negative Final    pH, Urine 05/30/2023 6.0  5.0 - 8.0 Final    Protein, POC 05/30/2023 Trace (A)  Negative mg/dL Final    Urobilinogen, UA 05/30/2023 Normal  Normal, 0.2 E.U./dL Final    Leukocytes 05/30/2023 Trace (A)  Negative Final    Nitrite, UA 05/30/2023 Negative  Negative Final    Urine Culture 05/30/2023 25,000 CFU/mL Enterococcus faecalis (A)   Final     EKG Results:  No orders to display     Imaging Results:  No Images in the past 120 days found.    ASSESSMENT AND PLAN:    ICD-10-CM ICD-9-CM   1. YUMI (generalized anxiety disorder)  F41.1 300.02   2. Major depressive disorder, recurrent, mild  F33.0 296.31   3. Binge eating disorder  F50.81 307.50     28 y.o. female who presents today for follow-up regarding MDD, YUMI. We have discussed the interval history and the treatment plan below, including potential R/B/SE of the recommended regimen of which the patient demonstrates understanding. Patient is agreeable to call 911 or go to the nearest ER should she become concerned for her own safety and/or the safety of those around her. There are no overt indices of acute shannon/psychosis on exam today.     Medication regimen: begin clonidine 0.1 mg BID PRN anxiety/irritability, titrate buspirone 10 mg TID; continue sertraline, bupropion XL; patient is advised not to misuse prescribed medications or to use them with any exogenous substances that aren't disclosed to this provider as they may interact with the regimen to the patient's detriment.   Risk Assessment: protracted risk is moderate, imminent risk is low - no interval change. Do note that this is subject to change with the Religious of new stressors, treatment non-adherence, use of substances, and/or new medical ails.   Monitoring: reviewed labs  as populated above  Therapy: defer per patient preference  Follow-up: 6 weeks  Communications: N/A    TREATMENT PLAN/GOALS: challenge patterns of living conducive to symptom burden, implement recommended regimen as above with augmentative, intermittent supportive psychotherapy to reduce symptom burden. Patient acknowledged and verbally consented to continue treatment. The importance of adherence to the recommended treatment and interval follow-up appointments was again emphasized today: patient has good treatment adherence per given history. Patient was today reminded to limit daily caffeine intake, hydrate appropriately, eat healthy and nutritious foods, engage sleep hygiene measures, engage appropriate exposure to sunlight, engage with hobbies in balance with life necessities, and exercise appropriate to their capacity to do so.     Billing: This encounter is of moderate complexity based on number/complexity of problems addressed today and risk of complications/morbidity: 2+ stable chronic illnesses and prescription management. Additionally, I provided 18 minutes of dedicated psychotherapy to the patient as documented above.     Parts of this note are electronic transcriptions/translations of spoken language to printed text using the Dragon Dictation system.    Electronically signed by Freddy Calvo MD, 10/10/23, [id number]

## 2023-11-03 RX ORDER — LISDEXAMFETAMINE DIMESYLATE CAPSULES 40 MG/1
40 CAPSULE ORAL EVERY MORNING
Qty: 30 CAPSULE | Refills: 0 | Status: SHIPPED | OUTPATIENT
Start: 2023-11-03

## 2023-11-03 RX ORDER — LISDEXAMFETAMINE DIMESYLATE CAPSULES 30 MG/1
30 CAPSULE ORAL EVERY MORNING
Qty: 30 CAPSULE | Refills: 0 | Status: SHIPPED | OUTPATIENT
Start: 2023-11-03

## 2023-11-03 NOTE — TELEPHONE ENCOUNTER
Refill request for  controlled substance.      Date of request: 11/3/2023    Medication requested: Vyvanse   Last OV: 8/4/23  Last UDS: 7/11/23  Contract signed: yes    Date:7/11/23  Next office visit: 11/21/23    Dorita Dial

## 2023-11-16 DIAGNOSIS — Z01.818 PREOPERATIVE TESTING: Primary | ICD-10-CM

## 2023-11-20 ENCOUNTER — OFFICE VISIT (OUTPATIENT)
Dept: PSYCHIATRY | Facility: CLINIC | Age: 28
End: 2023-11-20
Payer: COMMERCIAL

## 2023-11-20 VITALS
HEART RATE: 82 BPM | DIASTOLIC BLOOD PRESSURE: 75 MMHG | WEIGHT: 293 LBS | HEIGHT: 71 IN | BODY MASS INDEX: 41.02 KG/M2 | SYSTOLIC BLOOD PRESSURE: 130 MMHG

## 2023-11-20 DIAGNOSIS — E66.9 OBESITY (BMI 30-39.9): Primary | ICD-10-CM

## 2023-11-20 DIAGNOSIS — F33.42 MAJOR DEPRESSION, RECURRENT, FULL REMISSION: Primary | ICD-10-CM

## 2023-11-20 DIAGNOSIS — F41.1 GAD (GENERALIZED ANXIETY DISORDER): ICD-10-CM

## 2023-11-20 DIAGNOSIS — E66.9 OBESITY (BMI 30-39.9): ICD-10-CM

## 2023-11-20 DIAGNOSIS — F50.81 BINGE EATING DISORDER: ICD-10-CM

## 2023-11-20 LAB
ALBUMIN SERPL-MCNC: 4.5 G/DL (ref 3.5–5.2)
ALBUMIN/GLOB SERPL: 1.5 G/DL
ALP SERPL-CCNC: 98 U/L (ref 39–117)
ALT SERPL W P-5'-P-CCNC: 12 U/L (ref 1–33)
ANION GAP SERPL CALCULATED.3IONS-SCNC: 11 MMOL/L (ref 5–15)
AST SERPL-CCNC: 15 U/L (ref 1–32)
BILIRUB SERPL-MCNC: 0.4 MG/DL (ref 0–1.2)
BUN SERPL-MCNC: 15 MG/DL (ref 6–20)
BUN/CREAT SERPL: 15.8 (ref 7–25)
CALCIUM SPEC-SCNC: 9.9 MG/DL (ref 8.6–10.5)
CHLORIDE SERPL-SCNC: 102 MMOL/L (ref 98–107)
CO2 SERPL-SCNC: 27 MMOL/L (ref 22–29)
CREAT SERPL-MCNC: 0.95 MG/DL (ref 0.57–1)
DEPRECATED RDW RBC AUTO: 38.1 FL (ref 37–54)
EGFRCR SERPLBLD CKD-EPI 2021: 83.3 ML/MIN/1.73
ERYTHROCYTE [DISTWIDTH] IN BLOOD BY AUTOMATED COUNT: 13.1 % (ref 12.3–15.4)
GLOBULIN UR ELPH-MCNC: 3 GM/DL
GLUCOSE SERPL-MCNC: 92 MG/DL (ref 65–99)
HBA1C MFR BLD: 5.2 % (ref 4.8–5.6)
HCT VFR BLD AUTO: 39.4 % (ref 34–46.6)
HGB BLD-MCNC: 13 G/DL (ref 12–15.9)
MCH RBC QN AUTO: 26.9 PG (ref 26.6–33)
MCHC RBC AUTO-ENTMCNC: 33 G/DL (ref 31.5–35.7)
MCV RBC AUTO: 81.4 FL (ref 79–97)
PLATELET # BLD AUTO: 268 10*3/MM3 (ref 140–450)
PMV BLD AUTO: 10.6 FL (ref 6–12)
POTASSIUM SERPL-SCNC: 4.1 MMOL/L (ref 3.5–5.2)
PROT SERPL-MCNC: 7.5 G/DL (ref 6–8.5)
RBC # BLD AUTO: 4.84 10*6/MM3 (ref 3.77–5.28)
SODIUM SERPL-SCNC: 140 MMOL/L (ref 136–145)
T4 FREE SERPL-MCNC: 1.14 NG/DL (ref 0.93–1.7)
TSH SERPL DL<=0.05 MIU/L-ACNC: 1.26 UIU/ML (ref 0.27–4.2)
WBC NRBC COR # BLD AUTO: 7.53 10*3/MM3 (ref 3.4–10.8)

## 2023-11-20 PROCEDURE — 84439 ASSAY OF FREE THYROXINE: CPT | Performed by: OBSTETRICS & GYNECOLOGY

## 2023-11-20 PROCEDURE — 83036 HEMOGLOBIN GLYCOSYLATED A1C: CPT | Performed by: OBSTETRICS & GYNECOLOGY

## 2023-11-20 PROCEDURE — 80050 GENERAL HEALTH PANEL: CPT | Performed by: OBSTETRICS & GYNECOLOGY

## 2023-11-20 PROCEDURE — 99214 OFFICE O/P EST MOD 30 MIN: CPT | Performed by: PSYCHIATRY & NEUROLOGY

## 2023-11-20 NOTE — PROGRESS NOTES
"[facility]  Follow-up Visit    Chief Complaint: \"My  and I have three children... want to have another one... had my IUD taken out in October 2022... always had me on Zoloft... since I had my IUD taken out, I just haven't been myself... I have no libido, I don't have any energy... vitamin D... seemed to help a little... I saw Dr. De Leon... said I have depression... we need to put an IUD back in... and up it to 100... I'm on edge all the time...\"     History of Present Illness: Purvi Foreman is a 28 y.o. female who presents today for follow-up regarding MDD, YUMI. Last seen: 10/10 at which time clonidine was started, buspirone was changed. She presents unaccompanied in no acute distress and engages with me appropriately. Psychotropic regimen perceived to be effective. Side-effects per given history: sedation with clonidine.    Current treatment regimen includes:   - sertraline 150 mg QD  - bupropion  mg QD  - clonidine 0.1 mg BID PRN anxiety/irritability  - buspirone 10 mg TID (20 mg QAM and 10 mg QPM)  - via another provider: Vyvanse 70 mg QD for appetite suppression (30+40 given issues with shortages), trazodone 50 mg HS PRN insomnia    Today she feels she's had a turnaround and is feeling much better. Anxiety symptoms are adequately managed with current interventions. Depression symptoms are adequately managed with current interventions. Considering weight loss surgery, has appointment in January with Dr. Moore. Binge eating disorder is adequately managed with her lisdexamfetamine, but excess weight does tend to contribute to psychological suffering due to associative constructs in the patient's thinking. Thought process and content are devoid of overt aberration suggestive of acute shannon/psychosis. The patient denies SI/HI/AVH. There are no overt changes on exam today compared to most recent evaluation.  - contextual changes: considering weight loss " surgery  - sleep: no change, adequate  - appetite: reduced (intentional) with lisdexamfetamine    I have counseled the patient with regard to diagnoses and the recommended treatment regimen as documented below. Patient acknowledges the diagnoses per my rendered interpretation. Patient demonstrates understanding of potential risks/benefits/side effects associated with this regimen and is amenable to proceed in this fashion.     Psychotherapy  - Time: 10 minutes  - interventions employed: the therapeutic alliance was strengthened to encourage the patient to express their thoughts and feelings freely. Esteem building was enhanced through praise, reassurance, normalizing/challenging, and encouragement as appropriate. Coping skills were enhanced to build distress tolerance skills and emotional regulation. Allowed patient to freely discuss issues without interruption or judgement with unconditional positive regard, active listening skills, and empathy. Provided a safe, confidential environment to facilitate the development of a positive therapeutic relationship and encourage open, honest communication. Assisted patient in processing session content; acknowledged and normalized/addressed, as appropriate, patient’s thoughts, feelings, and concerns by utilizing a person-centered approach in efforts to build appropriate rapport and a positive therapeutic relationship with open and honest communication.   - Diagnoses: see assessment and plan below  - Symptoms: see subjective above  - Goals   - patient: sustain improvements to mood, bolster frustration tolerance, work to implement more time for herself and respite in general   - provider: challenge patterns of living conducive to pathology, strengthen defenses, promote problems solving, restore adaptive functioning and provide symptom relief.  - Treatment plan: continue supportive psychotherapy in subsequent appointments to provide symptom relief; see assessment and plan below  for additional details:   - iteration: 1   - progress: good   - (X)illumination, (X)contextualization, (X)detection, (working)development, (-)elaboration, (-)refinement  - functional status: improved  - mental status exam: as below  - prognosis: good    Psychiatric History:  Diagnoses: anxiety, depression, BED  Outpatient history: denies  Inpatient history: denies  Medication trials: fluoxetine, duloxetine  Other treatment modalities: saw a therapist at a young age (around 9 YO, time of parents' divorce)  Presenting regimen: Vyvanse 70 mg QD for appetite suppression, sertraline 100 mg QD, buspirone 7.5 mg TID  Self harm: denies  Suicide attempts: denies     Social History:  Residence: lives in a house with  ( 2018) and her three children (1 YO, 3 YO, 4 YO)  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[employer] (was [employer]; change was 09/2021 and since some of the volume has been more difficult to manage due to systemic issues)  Education: HS diploma, some college  Pertinent developmental history: denies; denies abuse hx, but dad left when patient was 4 YO and she didn't see him again until she was around 9 YO, mom was working three jobs (spent most of her time with her )  Pertinent legal history: denies  Hobbies/interests: played sports (tumbling, gymnastics); does spend a good amount of time outdoors (fishing, on boat), but isn't particularly interested in doing this  Anabaptist: Shinto  Exercise: 20 minutes every other day  Dietary habits: defer  Sleep hygiene: defer  Social habits: time constraints  Sunlight: no concern for under-exposure  Caffeine intake: no pertinent issues; 1 can of coke daily, 40 oz of tea daily, rarely coffee  Hydration habits: no pertinent issues   history: denies    Social History     Socioeconomic History    Marital status:    Tobacco Use    Smoking status: Never    Smokeless tobacco: Never   Vaping Use    Vaping Use: Never used    Substance and Sexual Activity    Alcohol use: Yes     Comment: drinks rarely    Drug use: Never    Sexual activity: Yes     Partners: Male     Birth control/protection: I.U.D.     Comment: Mirena 4/4/23     Tobacco use counseling/intervention: N/A, patient does not use tobacco; patient has been counseled with regard to risks of tobacco use.    PHQ-9 Depression Screening  PHQ-9 Total Score:      Little interest or pleasure in doing things?     Feeling down, depressed, or hopeless?     Trouble falling or staying asleep, or sleeping too much?     Feeling tired or having little energy?     Poor appetite or overeating?     Feeling bad about yourself - or that you are a failure or have let yourself or your family down?     Trouble concentrating on things, such as reading the newspaper or watching television?     Moving or speaking so slowly that other people could have noticed? Or the opposite - being so fidgety or restless that you have been moving around a lot more than usual?     Thoughts that you would be better off dead, or of hurting yourself in some way?     PHQ-9 Total Score       Change in PHQ-9 since last measure: N/A (15)    YUMI-7       Change in YUMI-7 since last measure: N/A (17)    Problem List:  Patient Active Problem List   Diagnosis    Binge eating disorder    Abnormal thyroid blood test    YUMI (generalized anxiety disorder)    Low libido    Fatigue    Irregular menses    Current severe episode of major depressive disorder without psychotic features, unspecified whether recurrent    Encounter for other contraceptive management    Morbid obesity with BMI of 40.0-44.9, adult    Major depression, recurrent, full remission     Allergy:   Allergies   Allergen Reactions    Penicillins Swelling and Rash    Latex Rash      Discontinued Medications:  There are no discontinued medications.    Current Medications:   Current Outpatient Medications   Medication Sig Dispense Refill    buPROPion XL (Wellbutrin XL) 300  MG 24 hr tablet Take 1 tablet by mouth Every Morning. 90 tablet 0    busPIRone (BUSPAR) 10 MG tablet Take 1 tablet by mouth 3 (Three) Times a Day. 270 tablet 0    Cholecalciferol (Vitamin D) 50 MCG (2000 UT) tablet Take 2,000 Units by mouth Daily. 30 tablet 5    cloNIDine (Catapres) 0.1 MG tablet Take 1 tablet by mouth 2 (Two) Times a Day As Needed (anxiety/irritability). 180 tablet 0    Levonorgestrel (Mirena, 52 MG,) 20 MCG/DAY intrauterine device IUD 1 each by Intrauterine route.      levothyroxine (Synthroid) 25 MCG tablet Take 1 tablet by mouth Every Morning. 90 tablet 0    lisdexamfetamine (Vyvanse) 30 MG capsule Take 1 capsule by mouth Every Morning 30 capsule 0    lisdexamfetamine (Vyvanse) 40 MG capsule Take 1 capsule by mouth Every Morning 30 capsule 0    lisdexamfetamine (Vyvanse) 70 MG capsule Take 1 capsule by mouth Every Morning 30 capsule 0    sertraline (Zoloft) 100 MG tablet Take 1.5 tablets by mouth Daily. 135 tablet 0    traZODone (DESYREL) 50 MG tablet Take 1 tablet by mouth At Night As Needed for Sleep. 30 tablet 0     No current facility-administered medications for this visit.     Past Medical History:  Past Medical History:   Diagnosis Date    Anxiety     Depression     Foot pain, bilateral     History of gestational hypertension     Hypertension     No meds    Migraine WITH aura     Seasonal allergies      Past Surgical History:  Past Surgical History:   Procedure Laterality Date    APPENDECTOMY      RETAINED PLACENTA REMOVAL      D+C w second tri loss     Mental Status Exam:   Appearance: well-groomed, sits upright, age-appropriate, above average habitus, tall  Behavior: calm, cooperative, appropriate in demeanor, appropriate eye-contact  Mood/affect: euthymic / mood-congruent, appropriate in both range and amplitude  Speech: within expected variance; appropriate rate, appropriate rhythm, appropriate tone; non-pressured  Thought Process: linear, goal-directed; no FOI or RADHA; abstraction  "intact  Thought Content: coherent, devoid of overt delusions/perceptual disturbances  SI/HI: denies both SI and HI; exhibits future-orientation, self-advocates appropriately, no regular self-harm, no appreciable intent  Memory: no overt deficits  Orientation: oriented to person/place/time/situation  Concentration: appropriate during interview  Intellectual capacity: presumptively average  Insight: fair by given history/exam  Judgment: appropriate by given history/exam  Psychomotor: appropriate  Gait: WNL    Review of Systems:  Review of Systems   Constitutional:  Negative for activity change, appetite change and unexpected weight change.   HENT:  Negative for drooling.    Eyes:  Negative for visual disturbance.   Respiratory:  Negative for chest tightness and shortness of breath.    Cardiovascular:  Negative for chest pain and palpitations.   Gastrointestinal:  Negative for abdominal pain, diarrhea and nausea.   Endocrine: Negative for cold intolerance and heat intolerance.   Genitourinary:  Negative for difficulty urinating and frequency.   Musculoskeletal:  Negative for myalgias and neck stiffness.   Skin:  Negative for rash.   Neurological:  Negative for dizziness, tremors, seizures and light-headedness.     Vital Signs:   /75   Pulse 82   Ht 180.3 cm (70.98\")   Wt 133 kg (293 lb)   BMI 40.88 kg/m²      Lab Results:   Office Visit on 07/11/2023   Component Date Value Ref Range Status    Amphetamine Screen, Urine 07/11/2023 Positive (A)  Negative Final    AMP INTERNAL CONTROL 07/11/2023 Passed  Passed Final    Barbiturates Screen, Urine 07/11/2023 Negative  Negative Final    BARBITURATE INTERNAL CONTROL 07/11/2023 Passed  Passed Final    Buprenorphine, Screen, Urine 07/11/2023 Negative  Negative Final    BUPRENORPHINE INTERNAL CONTROL 07/11/2023 Passed  Passed Final    Benzodiazepine Screen, Urine 07/11/2023 Negative  Negative Final    BENZODIAZEPINE INTERNAL CONTROL 07/11/2023 Passed  Passed Final    " Cocaine Screen, Urine 07/11/2023 Negative  Negative Final    COCAINE INTERNAL CONTROL 07/11/2023 Passed  Passed Final    MDMA (ECSTASY) 07/11/2023 Negative  Negative Final    MDMA (ECSTASY) INTERNAL CONTROL 07/11/2023 Passed  Passed Final    Methamphetamine, Ur 07/11/2023 Negative  Negative Final    METHAMPHETAMINE INTERNAL CONTROL 07/11/2023 Passed  Passed Final    Methadone Screen, Urine 07/11/2023 Negative  Negative Final    METHADONE INTERNAL CONTROL 07/11/2023 Passed  Passed Final    Opiate Screen 07/11/2023 Negative  Negative Final    OPIATES INTERNAL CONTROL 07/11/2023 Passed  Passed Final    Oxycodone Screen, Urine 07/11/2023 Negative  Negative Final    OXYCODONE INTERNAL CONTROL 07/11/2023 Passed  Passed Final    Phencyclidine (PCP), Urine 07/11/2023 Negative  Negative Final    PHENCYCLIDINE INTERNAL CONTROL 07/11/2023 Passed  Passed Final    THC, Screen, Urine 07/11/2023 Negative  Negative Final    THC INTERNAL CONTROL 07/11/2023 Passed  Passed Final    Lot Number 07/11/2023 [id number]   Final    Expiration Date 07/11/2023 10/18/2024   Final   Lab on 05/30/2023   Component Date Value Ref Range Status    Glucose, UA 05/30/2023 Negative  Negative mg/dL Final    Bilirubin 05/30/2023 Negative  Negative Final    Ketones, UA 05/30/2023 Negative  Negative Final    Specific Gravity  05/30/2023 1.025  1.005 - 1.030 Final    Blood, UA 05/30/2023 3+ (A)  Negative Final    pH, Urine 05/30/2023 6.0  5.0 - 8.0 Final    Protein, POC 05/30/2023 Trace (A)  Negative mg/dL Final    Urobilinogen, UA 05/30/2023 Normal  Normal, 0.2 E.U./dL Final    Leukocytes 05/30/2023 Trace (A)  Negative Final    Nitrite, UA 05/30/2023 Negative  Negative Final    Urine Culture 05/30/2023 25,000 CFU/mL Enterococcus faecalis (A)   Final     EKG Results:  No orders to display     Imaging Results:  No Images in the past 120 days found.    ASSESSMENT AND PLAN:    ICD-10-CM ICD-9-CM   1. Major depression, recurrent, full remission  F33.42 296.36    2. YUMI (generalized anxiety disorder)  F41.1 300.02   3. Binge eating disorder  F50.81 307.50     28 y.o. female who presents today for follow-up regarding MDD, YUMI. We have discussed the interval history and the treatment plan below, including potential R/B/SE of the recommended regimen of which the patient demonstrates understanding. Patient is agreeable to call 911 or go to the nearest ER should she become concerned for her own safety and/or the safety of those around her. There are no overt indices of acute shannon/psychosis on exam today.     Medication regimen: continue clonidine 0.1 mg BID PRN anxiety/irritability,  buspirone 10 mg TID, sertraline, bupropion XL; patient is advised not to misuse prescribed medications or to use them with any exogenous substances that aren't disclosed to this provider as they may interact with the regimen to the patient's detriment.   Risk Assessment: protracted risk is moderate, imminent risk is low - no interval change. Do note that this is subject to change with the [place] of new stressors, treatment non-adherence, use of substances, and/or new medical ails.   Monitoring: reviewed labs as populated above  Therapy: defer per patient preference  Follow-up: 2 months  Communications: N/A    TREATMENT PLAN/GOALS: challenge patterns of living conducive to symptom burden, implement recommended regimen as above with augmentative, intermittent supportive psychotherapy to reduce symptom burden. Patient acknowledged and verbally consented to continue treatment. The importance of adherence to the recommended treatment and interval follow-up appointments was again emphasized today: patient has good treatment adherence per given history. Patient was today reminded to limit daily caffeine intake, hydrate appropriately, eat healthy and nutritious foods, engage sleep hygiene measures, engage appropriate exposure to sunlight, engage with hobbies in balance with life necessities, and exercise  appropriate to their capacity to do so.     Billing: This encounter is of moderate complexity based on number/complexity of problems addressed today and risk of complications/morbidity: 2+ stable chronic illnesses and prescription management.     Parts of this note are electronic transcriptions/translations of spoken language to printed text using the Dragon Dictation system.    Electronically signed by Freddy Calvo MD, 11/20/23, 1826

## 2023-11-21 ENCOUNTER — OFFICE VISIT (OUTPATIENT)
Dept: INTERNAL MEDICINE | Facility: CLINIC | Age: 28
End: 2023-11-21
Payer: COMMERCIAL

## 2023-11-21 VITALS
BODY MASS INDEX: 39.34 KG/M2 | WEIGHT: 281 LBS | HEART RATE: 99 BPM | DIASTOLIC BLOOD PRESSURE: 81 MMHG | SYSTOLIC BLOOD PRESSURE: 117 MMHG | OXYGEN SATURATION: 98 % | HEIGHT: 71 IN

## 2023-11-21 DIAGNOSIS — F50.81 BINGE EATING DISORDER: ICD-10-CM

## 2023-11-21 DIAGNOSIS — G47.00 INSOMNIA, UNSPECIFIED TYPE: ICD-10-CM

## 2023-11-21 DIAGNOSIS — F33.42 MAJOR DEPRESSION, RECURRENT, FULL REMISSION: Primary | ICD-10-CM

## 2023-11-21 PROCEDURE — 99214 OFFICE O/P EST MOD 30 MIN: CPT | Performed by: INTERNAL MEDICINE

## 2023-11-21 RX ORDER — LEVOTHYROXINE SODIUM 0.03 MG/1
25 TABLET ORAL
Qty: 90 TABLET | Refills: 1 | Status: SHIPPED | OUTPATIENT
Start: 2023-11-21

## 2023-11-21 RX ORDER — TRAZODONE HYDROCHLORIDE 50 MG/1
50 TABLET ORAL NIGHTLY PRN
Qty: 90 TABLET | Refills: 1 | Status: SHIPPED | OUTPATIENT
Start: 2023-11-21

## 2023-11-21 RX ORDER — SERTRALINE HYDROCHLORIDE 100 MG/1
150 TABLET, FILM COATED ORAL DAILY
Qty: 135 TABLET | Refills: 0 | Status: CANCELLED | OUTPATIENT
Start: 2023-11-21

## 2023-11-21 RX ORDER — ESCITALOPRAM OXALATE 20 MG/1
20 TABLET ORAL DAILY
Qty: 90 TABLET | Refills: 1 | Status: SHIPPED | OUTPATIENT
Start: 2023-11-21

## 2023-11-21 NOTE — PROGRESS NOTES
"Chief Complaint  Obesity (Follow up ) and binge eating disorder (Follow up )    Subjective          Purvi Foreman presents to [facility]  History of Present Illness  Patient is considering bariatric surgery. Patient reports having difficulty maintaining weight loss. Patient concerned medications may be interacting. Patient reports previously on lexapro, and did not work well enough by itself. Patient thinks may work well with addition of wellbutrin, and buspar. Patient has not been taking synthroid. Patient reports sleeping well and trazodone is helpful.       Current Outpatient Medications   Medication Instructions    buPROPion XL (WELLBUTRIN XL) 300 mg, Oral, Every Morning    busPIRone (BUSPAR) 10 mg, Oral, 3 Times Daily    cloNIDine (CATAPRES) 0.1 mg, Oral, 2 Times Daily PRN    escitalopram (LEXAPRO) 20 mg, Oral, Daily    Levonorgestrel (Mirena, 52 MG,) 20 MCG/DAY intrauterine device IUD 1 each, Intrauterine    levothyroxine (SYNTHROID) 25 mcg, Oral, Every Early Morning    lisdexamfetamine (VYVANSE) 30 mg, Oral, Every Morning    lisdexamfetamine (VYVANSE) 40 mg, Oral, Every Morning    traZODone (DESYREL) 50 mg, Oral, Nightly PRN       The following portions of the patient's history were reviewed and updated as appropriate: allergies, current medications, past family history, past medical history, past social history, past surgical history, and problem list.    Objective   Vital Signs:   /81 (BP Location: Left arm)   Pulse 99   Ht 180.3 cm (71\")   Wt 127 kg (281 lb)   SpO2 98%   BMI 39.19 kg/m²     BP Readings from Last 3 Encounters:   11/21/23 117/81   11/20/23 130/75   10/10/23 131/83     Wt Readings from Last 3 Encounters:   11/21/23 127 kg (281 lb)   11/20/23 133 kg (293 lb)   10/10/23 132 kg (290 lb 6.4 oz)         Physical Exam   Appearance: No acute distress, well-nourished  Head: normocephalic, atraumatic  Eyes: extraocular movements " intact, no scleral icterus, no conjunctival injection  Ears, Nose, and Throat: external ears normal, nares patent, moist mucous membranes  Cardiovascular: regular rate. no edema  Respiratory: breathing comfortably, symmetric chest rise,   Neuro: alert and oriented to time, place, and person. Normal gait  Psych: normal mood and affect       Result Review :   The following data was reviewed by: Kendrick Thompson Jr, MD on 11/21/2023:  Common labs          1/13/2023    10:21 11/20/2023    15:19   Common Labs   Glucose  92    BUN  15    Creatinine  0.95    Sodium  140    Potassium  4.1    Chloride  102    Calcium  9.9    Albumin  4.5    Total Bilirubin  0.4    Alkaline Phosphatase  98    AST (SGOT)  15    ALT (SGPT)  12    WBC 7.65  7.53    Hemoglobin 12.8  13.0    Hematocrit 39.4  39.4    Platelets 291  268    Hemoglobin A1C  5.20        Lab Results   Component Value Date    SARSANTIGEN Not Detected 08/10/2022    COVID19 Not Detected 08/10/2022    RAPFLUA Negative 08/08/2022    RAPFLUB Negative 08/08/2022    FLUAAG Not Detected 08/10/2022    FLUBAG Not Detected 08/10/2022    RAPSCRN Negative 08/10/2022    INR 0.95 (L) 01/16/2019    BILIRUBINUR Negative 05/30/2023       Last Urine Toxicity  More data exists         Latest Ref Rng & Units 7/11/2023 3/14/2023   LAST URINE TOXICITY RESULTS   Amphetamine, Urine Qual Negative Positive  Positive    Barbiturates Screen, Urine Negative Negative  Negative    Benzodiazepine Screen, Urine Negative Negative  Negative    Buprenorphine, Screen, Urine Negative Negative  Negative    Cocaine Screen, Urine Negative Negative  Negative    Methadone Screen , Urine Negative Negative  Negative    Methamphetamine, Ur Negative Negative  Negative        Assessment and Plan    Diagnoses and all orders for this visit:    1. Major depression, recurrent, full remission (Primary)  Comments:  switch from sertraline to mroe weight neutral lexapro. monitor efficacy. f/u with mental health  team.  Orders:  -     escitalopram (Lexapro) 20 MG tablet; Take 1 tablet by mouth Daily.  Dispense: 90 tablet; Refill: 1    2. Binge eating disorder  Comments:  cont vyvanse. encouraged restart synthroid to help. wellbutrin may also help with appetite suppression.  Orders:  -     levothyroxine (Synthroid) 25 MCG tablet; Take 1 tablet by mouth Every Morning.  Dispense: 90 tablet; Refill: 1    3. Insomnia, unspecified type  Comments:  doing well with trazodone prn nightly.  Orders:  -     traZODone (DESYREL) 50 MG tablet; Take 1 tablet by mouth At Night As Needed for Sleep.  Dispense: 90 tablet; Refill: 1          Medications Discontinued During This Encounter   Medication Reason    Cholecalciferol (Vitamin D) 50 MCG (2000 UT) tablet *Therapy completed    lisdexamfetamine (Vyvanse) 70 MG capsule *Therapy completed    sertraline (Zoloft) 100 MG tablet Alternate therapy    levothyroxine (Synthroid) 25 MCG tablet Reorder    traZODone (DESYREL) 50 MG tablet Reorder        Follow Up   Return in about 3 months (around 2/21/2024) for Recheck.  Patient was given instructions and counseling regarding her condition or for health maintenance advice. Please see specific information pulled into the AVS if appropriate.       Kendrick Thompson Jr, MD  11/21/23  10:49 EST

## 2023-12-22 ENCOUNTER — TELEPHONE (OUTPATIENT)
Dept: INTERNAL MEDICINE | Facility: CLINIC | Age: 28
End: 2023-12-22
Payer: COMMERCIAL

## 2023-12-22 RX ORDER — LISDEXAMFETAMINE DIMESYLATE CAPSULES 70 MG/1
70 CAPSULE ORAL EVERY MORNING
Qty: 30 CAPSULE | Refills: 0 | Status: SHIPPED | OUTPATIENT
Start: 2023-12-22

## 2023-12-22 RX ORDER — LISDEXAMFETAMINE DIMESYLATE CAPSULES 40 MG/1
40 CAPSULE ORAL EVERY MORNING
Qty: 30 CAPSULE | Refills: 0 | Status: CANCELLED | OUTPATIENT
Start: 2023-12-22

## 2023-12-22 RX ORDER — LISDEXAMFETAMINE DIMESYLATE CAPSULES 30 MG/1
30 CAPSULE ORAL EVERY MORNING
Qty: 30 CAPSULE | Refills: 0 | Status: CANCELLED | OUTPATIENT
Start: 2023-12-22

## 2023-12-22 NOTE — TELEPHONE ENCOUNTER
Refill request for  controlled substance.      Date of request: 12/22/2023   Medication requested: Vyvanse   Last OV: 11/21/23  Last UDS: 7/11/23  Contract signed: yes    Date:7/11/23  Next office visit: 2/23/24    Dorita Dial    Additional details provided by patient: PATIENT STATED THE PHARMACY HAS 70 MG AVAILABLE IF WE CAN SEND IT AS SOON AS POSSIBLE. OTHER WISE SHE HAS TO PAY FOR THEM SEPARATELY AND COSTS MORE MONEY.

## 2023-12-22 NOTE — TELEPHONE ENCOUNTER
Caller: Purvi Foreman    Relationship: Self    Best call back number: [phone]    Requested Prescriptions:   Requested Prescriptions     Pending Prescriptions Disp Refills    lisdexamfetamine (Vyvanse) 30 MG capsule 30 capsule 0     Sig: Take 1 capsule by mouth Every Morning    lisdexamfetamine (Vyvanse) 40 MG capsule 30 capsule 0     Sig: Take 1 capsule by mouth Every Morning        Pharmacy where request should be sent: [facility] [address] [place] [address] [phone] [facility] [phone]      Last office visit with prescribing clinician: 11/21/2023   Last telemedicine visit with prescribing clinician: Visit date not found   Next office visit with prescribing clinician: 2/23/2024     Additional details provided by patient: PATIENT STATED THE PHARMACY HAS 70 MG AVAILABLE IF WE CAN SEND IT AS SOON AS POSSIBLE. OTHER WISE SHE HAS TO PAY FOR THEM SEPARATELY AND COSTS MORE MONEY.     Does the patient have less than a 3 day supply:  [x] Yes  [] No      Rivera Perdomo Rep   12/22/23 10:02 EST

## 2024-01-02 ENCOUNTER — HOSPITAL ENCOUNTER (OUTPATIENT)
Dept: GENERAL RADIOLOGY | Facility: HOSPITAL | Age: 29
Discharge: HOME | End: 2024-01-02
Admitting: OBSTETRICS & GYNECOLOGY
Payer: COMMERCIAL

## 2024-01-02 DIAGNOSIS — Z01.818 PREOPERATIVE TESTING: ICD-10-CM

## 2024-01-02 PROCEDURE — 71046 X-RAY EXAM CHEST 2 VIEWS: CPT

## 2024-01-09 PROBLEM — R06.83 SNORING: Status: ACTIVE | Noted: 2024-01-09

## 2024-01-09 PROBLEM — R53.82 CHRONIC FATIGUE: Status: ACTIVE | Noted: 2023-01-13

## 2024-01-16 ENCOUNTER — HOSPITAL ENCOUNTER (OUTPATIENT)
Dept: CARDIOLOGY | Facility: HOSPITAL | Age: 29
Discharge: HOME | End: 2024-01-16
Admitting: OBSTETRICS & GYNECOLOGY
Payer: COMMERCIAL

## 2024-01-16 DIAGNOSIS — Z01.818 PREOPERATIVE TESTING: ICD-10-CM

## 2024-01-16 PROCEDURE — 93005 ELECTROCARDIOGRAM TRACING: CPT | Performed by: OBSTETRICS & GYNECOLOGY

## 2024-01-17 ENCOUNTER — CONSULT (OUTPATIENT)
Dept: BARIATRICS/WEIGHT MGMT | Facility: CLINIC | Age: 29
End: 2024-01-17
Payer: COMMERCIAL

## 2024-01-17 ENCOUNTER — PREP FOR SURGERY (OUTPATIENT)
Dept: OTHER | Facility: HOSPITAL | Age: 29
End: 2024-01-17
Payer: COMMERCIAL

## 2024-01-17 VITALS
HEIGHT: 69 IN | BODY MASS INDEX: 43.4 KG/M2 | TEMPERATURE: 97.8 F | WEIGHT: 293 LBS | HEART RATE: 112 BPM | DIASTOLIC BLOOD PRESSURE: 86 MMHG | SYSTOLIC BLOOD PRESSURE: 148 MMHG

## 2024-01-17 DIAGNOSIS — G89.29 CHRONIC BILATERAL LOW BACK PAIN WITHOUT SCIATICA: ICD-10-CM

## 2024-01-17 DIAGNOSIS — R10.13 DYSPEPSIA: ICD-10-CM

## 2024-01-17 DIAGNOSIS — F41.1 GAD (GENERALIZED ANXIETY DISORDER): ICD-10-CM

## 2024-01-17 DIAGNOSIS — F50.81 BINGE EATING DISORDER: ICD-10-CM

## 2024-01-17 DIAGNOSIS — M54.50 CHRONIC BILATERAL LOW BACK PAIN WITHOUT SCIATICA: ICD-10-CM

## 2024-01-17 DIAGNOSIS — E66.01 MORBID OBESITY WITH BMI OF 40.0-44.9, ADULT: Primary | ICD-10-CM

## 2024-01-17 DIAGNOSIS — Z01.818 PREOPERATIVE TESTING: ICD-10-CM

## 2024-01-17 DIAGNOSIS — F33.42 MAJOR DEPRESSION, RECURRENT, FULL REMISSION: ICD-10-CM

## 2024-01-17 DIAGNOSIS — R53.82 CHRONIC FATIGUE: ICD-10-CM

## 2024-01-17 DIAGNOSIS — Z71.3 DIETARY COUNSELING: ICD-10-CM

## 2024-01-17 PROBLEM — F32.2 CURRENT SEVERE EPISODE OF MAJOR DEPRESSIVE DISORDER WITHOUT PSYCHOTIC FEATURES, UNSPECIFIED WHETHER RECURRENT: Status: RESOLVED | Noted: 2023-04-06 | Resolved: 2024-01-17

## 2024-01-17 LAB
QT INTERVAL: 372 MS
QTC INTERVAL: 452 MS

## 2024-01-17 RX ORDER — SODIUM CHLORIDE, SODIUM LACTATE, POTASSIUM CHLORIDE, CALCIUM CHLORIDE 600; 310; 30; 20 MG/100ML; MG/100ML; MG/100ML; MG/100ML
30 INJECTION, SOLUTION INTRAVENOUS CONTINUOUS
OUTPATIENT
Start: 2024-01-17

## 2024-01-17 RX ORDER — CHOLECALCIFEROL (VITAMIN D3) 125 MCG
1 CAPSULE ORAL DAILY
COMMUNITY
Start: 2023-12-23

## 2024-01-17 NOTE — PROGRESS NOTES
"Metabolic and Bariatric Surgery Nutrition Assessment    Patient Name: Purvi Foreman    YOB: 1995   Age: 28 y.o.  Sex: female  MRN: [id number]     Assessment Date: 01/17/2024    Procedure considering: sleeve    Anthropometric Measurements  Height: 69.49\"          Current weight: 300 lbs   BMI: 43.68 kg/m²    Patient Goals and Expectations                        Patient's stated weight goal: 175-200 lbs  Reasons for desiring weight loss: improved quality of life     Medical History  Pertinent diagnosis/problems: depression, anxiety, binge eating disorder, back pain     Weight History  Highest adult weight: 300 lbs                              Lowest adult weight: 190 lbs  Onset of obesity: adolescence   Possible triggers or life events that may have led to weight gain: pregnancy, trauma, comfort eating     Previous Weight Loss Efforts  Patient has tried to lose weight in the past including Weight Watchers, calorie counting, intermittent fasting, high protein, prescription medications, RD supervised and exercise.   Patient has lost up to 40 lbs on diets, but was unable to maintain this long term.     Diet History  Patient is eating 2 meals and 1-2 snacks daily.     24 Hour Recall  Breakfast: none   Lunch: leftovers or turkey sandwich and chips   Dinner: Hello Fresh meal with meat, vegetables and starch   Snacks: fruit, brownie, snack cake    Beverages: Coke, sweet tea, water     Eating out: 1-2 times per week   Vitamins/supplements: none   Diet restrictions or food allergies: NKFA    Patient identifies problem areas to be physical hunger, over consumption, grazing, sweets cravings, binge eating, eating for comfort, eating out of boredom, eating in response to stress and inactivity.      Physical Activity  Work-related activity: sedentary   Planned exercise: none   Barriers to activity: none      Nutrition Intervention  Pre and post op nutrition education and coaching for behavior change completed. " Program materials provided. Emphasized the importance of taking in at least 70 g protein and 64 oz fluid daily. Discussed personal habits and lifestyle behaviors that may influence weight loss efforts. Self monitoring strategies such as keeping a food journal were encouraged. Patient verbalized understanding and questions were answered.  Short term goals: decrease/eliminate high calorie and carbonated beverages     Recommendations/Plan  Patient will be evaluated by multidisciplinary team before undergoing a review of their candidacy.  Additional nutrition counseling available and encouraged both pre and post op.   Patient demonstrated a good comprehension of diet requirements and commitment to make healthy changes.   Purvi Foreman appears to be a suitable candidate for bariatric surgery from a nutritional standpoint.      Katherine Harris RD  01/17/24  11:13 EST

## 2024-01-17 NOTE — PROGRESS NOTES
[facility]  [address] [id number] [phone]  [address] [id number] [phone]  Dept: [phone]  1/17/2024      Purvi Foreman.  [id number]  [id number]  1995  female      Chief Complaint of weight gain; unable to maintain weight loss    History of Present Illness:   Purvi is a 28 y.o. female who presents today for evaluation, education and consultation regarding weight loss surgery. The patient is interested in the sleeve gastrectomy.      Diet History:Purvi has been overweight for at least 6 years, has been 35 pounds or more overweight for at least 6 years, has been 100 pounds or more overweight for 3 or more years and started dieting at age 24.  The most weight Purvi lost was 30 pounds on medication and maintained the weight loss for a few months. Purvi describes her eating habits as snacking between meals, eating sweets, drinking caffeine containing drins, drinking soda, and emotional eating. Purvi Foreman has tried Weight Watchers, Fasting, Physician monitored, reduced calorie, exercising, and prescription medications among others with success of losing up to 30 pounds, but in each instance regained the weight.     See dietician documentation for complete history.    Bariatric Surgery Evaluation: The patient is being seen for an initial visit for bariatric surgery evaluation and education.     Bariatric Co-morbidities:  back pain, menstrual irregularities, and depression    Patient Active Problem List   Diagnosis    Binge eating disorder    Abnormal thyroid blood test    YUMI (generalized anxiety disorder)    Low libido    Chronic fatigue    Irregular menses    Encounter for other contraceptive management    Morbid obesity with BMI of 40.0-44.9, adult    Major depression, recurrent, full remission    Snoring    Dyspepsia    Dietary counseling    Chronic low back pain        Past Medical History:   Diagnosis Date    Anxiety     Depression     Foot pain, bilateral     History of gestational hypertension     Hypertension     No meds    Migraine WITH aura     Seasonal allergies        Past Surgical History:   Procedure Laterality Date    APPENDECTOMY  2012    RETAINED PLACENTA REMOVAL  2019    D+C w second tri loss       Allergies   Allergen Reactions    Penicillins Swelling and Rash    Latex Rash         Current Outpatient Medications:     buPROPion XL (Wellbutrin XL) 300 MG 24 hr tablet, Take 1 tablet by mouth Every Morning., Disp: 90 tablet, Rfl: 0    busPIRone (BUSPAR) 10 MG tablet, Take 1 tablet by mouth 3 (Three) Times a Day., Disp: 270 tablet, Rfl: 0    Cholecalciferol (Vitamin D3) 50 MCG (2000 UT) tablet, Take 1 tablet by mouth Daily., Disp: , Rfl:     escitalopram (Lexapro) 20 MG tablet, Take 1 tablet by mouth Daily., Disp: 90 tablet, Rfl: 1    Levonorgestrel (Mirena, 52 MG,) 20 MCG/DAY intrauterine device IUD, 1 each by Intrauterine route., Disp: , Rfl:     levothyroxine (Synthroid) 25 MCG tablet, Take 1 tablet by mouth Every Morning., Disp: 90 tablet, Rfl: 1    lisdexamfetamine (Vyvanse) 70 MG capsule, Take 1 capsule by mouth Every Morning, Disp: 30 capsule, Rfl: 0    cloNIDine (Catapres) 0.1 MG tablet, Take 1 tablet by mouth 2 (Two) Times a Day As Needed (anxiety/irritability)., Disp: 180 tablet, Rfl: 0    traZODone (DESYREL) 50 MG tablet, Take 1 tablet by mouth At Night As Needed for Sleep., Disp: 90 tablet, Rfl: 1    Social History     Socioeconomic History    Marital status:    Tobacco Use    Smoking status: Never    Smokeless tobacco: Never   Vaping Use    Vaping Use: Never used   Substance and Sexual Activity    Alcohol use: Yes     Comment: drinks rarely    Drug use: Never    Sexual activity: Yes     Partners: Male     Birth control/protection: I.U.D.     Comment: Mirena 4/4/23       Family History   Problem Relation Age of Onset    Endometrial cancer Mother          HYPERPLASIA    Hypertension Mother     Hypertension Father     Diabetes Father     Esophageal cancer Maternal Grandfather     Lung cancer Paternal Grandmother     Stroke Neg Hx     Breast cancer Neg Hx     Colon cancer Neg Hx     Ovarian cancer Neg Hx          Review of Systems:  Review of Systems   Constitutional:  Positive for fatigue.   Respiratory:  Negative for chest tightness and shortness of breath.    Cardiovascular:  Negative for chest pain.   Gastrointestinal:         Dyspepsia   Musculoskeletal:  Positive for back pain.   All other systems reviewed and are negative.      Physical Exam:  Vital Signs:  Weight: 136 kg (300 lb)   Body mass index is 43.68 kg/m².  Temp: 97.8 °F (36.6 °C)   Heart Rate: 112   BP: 148/86     Physical Exam  Vitals reviewed.   Constitutional:       General: She is not in acute distress.     Appearance: Normal appearance. She is morbidly obese.   HENT:      Head: Normocephalic and atraumatic.      Mouth/Throat:      Mouth: Mucous membranes are moist.   Eyes:      General: No scleral icterus.     Extraocular Movements: Extraocular movements intact.      Conjunctiva/sclera: Conjunctivae normal.      Pupils: Pupils are equal, round, and reactive to light.   Cardiovascular:      Rate and Rhythm: Normal rate and regular rhythm.      Heart sounds: Normal heart sounds. No murmur heard.     No gallop.   Pulmonary:      Effort: Pulmonary effort is normal. No respiratory distress.      Breath sounds: Normal breath sounds.   Abdominal:      General: Bowel sounds are normal. There is no distension.      Palpations: Abdomen is soft. There is no mass.      Tenderness: There is no abdominal tenderness. There is no guarding.   Musculoskeletal:         General: Normal range of motion.      Cervical back: Normal range of motion and neck supple.   Skin:     General: Skin is warm and dry.   Neurological:      General: No focal deficit present.      Mental Status: She is alert and oriented to  person, place, and time.   Psychiatric:         Mood and Affect: Mood normal.         Behavior: Behavior normal.         Thought Content: Thought content normal.         Judgment: Judgment normal.            Assessment:         Purvi Foreman is a 28 y.o. year old female with medically complicated severe obesity. Weight: 136 kg (300 lb), Body mass index is 43.68 kg/m². and weight related problems including back pain, menstrual irregularities, and depression.    I explained in detail, potential surgical options of interest to the patient including the RNY gastric bypass, sleeve gastrectomy, and gastric band while considering the patient's medical history. At this time, the patient expressed interest in the Laparoscopic Sleeve  All of those procedures can be performed laparoscopically but there is a chance to convert to open if any technical challenges or complications do occur.  Bariatric surgery is not cosmetic surgery but rather a tool to help a patient make a life-long commitment lifestyle changes including diet, exercise, behavior changes, and taking supplemental vitamins and minerals.    Due to the patient's BMI, history, and co-morbidities related to potential surgical complications were evaluated. Due to Purvi Foreman's risk factors female will obtain the following prior to being scheduled for surgical intervention:    A letter of medical support as well as a history and physical must be obtained from her primary care provider. The patient was given a copy of a sample form, that will suffice as their letter to take to their primary are provider.    A referral for pre-operative psychological evaluation was ordered for the patient to evaluate candidacy as well as provide mental health support, should it be warranted before or after surgery.    Radiologic studies CXR 11/20/2023, Cardiology studies EKG 1/16/2024, and labs from 11/20/2023 as well as notes from primary care provider were reviewed  and   psychology clearance and EGD with biopsywere ordered at this time. These will be drawn and patient will be notified with results. Patient will complete new, pre-operative radiology prior to being scheduled for surgery.     Purvi Foreman was screened for sleep apnea in our office today and based on their results she is low risk. The risks, as they relate to chronic hypercapnia r/t untreated SRIDEVI were discussed with the patient and She verbalized understanding.     A pre-operative diagnostic esophagogastroduodenoscopy with biopsy for evaluation will be ordered and scheduled for this patient. The risks and benefits of the procedure were discussed with the patient in detail and all questions were answered.  Possibility of perforation, bleeding, aspiration, anoxic brain injury, respiratory and/or cardiac arrest and death were discussed.   She received handouts regarding, all questions were answered.     As this patient is a female of childbearing age she was counseled regarding conception and pregnancy after surgery. Patient was advised that conception and pregnancy within the first 18 months following surgery is not advised due to increased metabolic demands required during pregnancy as well as increased risk of nutrient and vitamin deficiencies which could jeopardize fetal development and birth weight.    The risks, benefits, alternatives, and potential complications of all of the sleeve gastrectomy explained in detail including, but not limited to death, anesthesia and medication adverse effect/DVT, pulmonary embolism, trocar site/incisional hernia, wound infection, abdominal infection, bleeding, failure to lose weight or gain weight and change in body image, metabolic complications with calcium, thiamine, vitamin B12, folate, iron, and anemia.    The patient was advised to start a high protein, low fat and low carbohydrate diet  start routine exercise including but not limited to 150 minutes per week. The  patient received a resistance band along with a handout of exercises. The patient was given individualized information by our dietician along with handouts.     The patient was given information regarding the TARSHA educational video. TARSHA is an internet based educational video which explains the sourgical procedure and answers basic questions regarding the procedure. The patient was provided with instructions and a password to watch the video.    The patient understands the surgical procedures and the different surgical options that are available.  She understands the lifestyle changes that would be required after surgery and has agreed to participate in a pre-operative and postoperative weight management program.  She also expressed understanding of possible risks, had several questions answered and desires to proceed.    I think she is a good candidate for this surgery, and is interested in a sleeve gastrectomy.    Encounter Diagnoses   Name Primary?    Morbid obesity with BMI of 40.0-44.9, adult Yes    Dyspepsia     Binge eating disorder     YUMI (generalized anxiety disorder)     Major depression, recurrent, full remission     Chronic bilateral low back pain without sciatica     Chronic fatigue     Dietary counseling        Plan:    Patient will be evaluated by a bariatric dietician psychologist before undergoing a multidisciplinary review of her candidacy. We discussed weight loss requirements prior to surgery and rationale, as well as other program requirements to ensure the safest approach to surgery. We spent time discussing different surgical procedures and plan of care throughout their lifespan to ensure long term success in achieving and maintaining a healthier weight. Patient will proceed with preoperative lab work, radiology, and endoscopy after obtaining agreed upon specialty, clearances, sleep study, and letter of support from her primary care provider.    Total time spent during this encounter today  was 60 minutes and includes preparing for the visit, reviewing tests, performing a medically appropriate examination and/or evaluations, counseling and educating the patient/family/caregiver, ordering medications, tests, or procedures, documenting information in the medical record, and independently interpreting results and communicating that information with the patient/family/caregiver  Gertrude Grant, APRN  1/17/2024

## 2024-01-17 NOTE — H&P (VIEW-ONLY)
[facility] BARIATRIC SURGERY  [address] [id number] [phone]  [address] [id number] [phone]  Dept: [phone]  1/17/2024      Purvi Foreman.  [id number]  [id number]  1995  female      Chief Complaint of weight gain; unable to maintain weight loss    History of Present Illness:   Purvi is a 28 y.o. female who presents today for evaluation, education and consultation regarding weight loss surgery. The patient is interested in the sleeve gastrectomy.      Diet History:Purvi has been overweight for at least 6 years, has been 35 pounds or more overweight for at least 6 years, has been 100 pounds or more overweight for 3 or more years and started dieting at age 24.  The most weight Purvi lost was 30 pounds on medication and maintained the weight loss for a few months. Purvi describes her eating habits as snacking between meals, eating sweets, drinking caffeine containing drins, drinking soda, and emotional eating. Purvi Foreman has tried Weight Watchers, Fasting, Physician monitored, reduced calorie, exercising, and prescription medications among others with success of losing up to 30 pounds, but in each instance regained the weight.     See dietician documentation for complete history.    Bariatric Surgery Evaluation: The patient is being seen for an initial visit for bariatric surgery evaluation and education.     Bariatric Co-morbidities:  back pain, menstrual irregularities, and depression    Patient Active Problem List   Diagnosis    Binge eating disorder    Abnormal thyroid blood test    YUMI (generalized anxiety disorder)    Low libido    Chronic fatigue    Irregular menses    Encounter for other contraceptive management    Morbid obesity with BMI of 40.0-44.9, adult    Major depression, recurrent, full remission    Snoring    Dyspepsia    Dietary counseling    Chronic low back pain        Past Medical History:   Diagnosis Date    Anxiety     Depression     Foot pain, bilateral     History of gestational hypertension     Hypertension     No meds    Migraine WITH aura     Seasonal allergies        Past Surgical History:   Procedure Laterality Date    APPENDECTOMY  2012    RETAINED PLACENTA REMOVAL  2019    D+C w second tri loss       Allergies   Allergen Reactions    Penicillins Swelling and Rash    Latex Rash         Current Outpatient Medications:     buPROPion XL (Wellbutrin XL) 300 MG 24 hr tablet, Take 1 tablet by mouth Every Morning., Disp: 90 tablet, Rfl: 0    busPIRone (BUSPAR) 10 MG tablet, Take 1 tablet by mouth 3 (Three) Times a Day., Disp: 270 tablet, Rfl: 0    Cholecalciferol (Vitamin D3) 50 MCG (2000 UT) tablet, Take 1 tablet by mouth Daily., Disp: , Rfl:     escitalopram (Lexapro) 20 MG tablet, Take 1 tablet by mouth Daily., Disp: 90 tablet, Rfl: 1    Levonorgestrel (Mirena, 52 MG,) 20 MCG/DAY intrauterine device IUD, 1 each by Intrauterine route., Disp: , Rfl:     levothyroxine (Synthroid) 25 MCG tablet, Take 1 tablet by mouth Every Morning., Disp: 90 tablet, Rfl: 1    lisdexamfetamine (Vyvanse) 70 MG capsule, Take 1 capsule by mouth Every Morning, Disp: 30 capsule, Rfl: 0    cloNIDine (Catapres) 0.1 MG tablet, Take 1 tablet by mouth 2 (Two) Times a Day As Needed (anxiety/irritability)., Disp: 180 tablet, Rfl: 0    traZODone (DESYREL) 50 MG tablet, Take 1 tablet by mouth At Night As Needed for Sleep., Disp: 90 tablet, Rfl: 1    Social History     Socioeconomic History    Marital status:    Tobacco Use    Smoking status: Never    Smokeless tobacco: Never   Vaping Use    Vaping Use: Never used   Substance and Sexual Activity    Alcohol use: Yes     Comment: drinks rarely    Drug use: Never    Sexual activity: Yes     Partners: Male     Birth control/protection: I.U.D.     Comment: Mirena 4/4/23       Family History   Problem Relation Age of Onset    Endometrial cancer Mother          HYPERPLASIA    Hypertension Mother     Hypertension Father     Diabetes Father     Esophageal cancer Maternal Grandfather     Lung cancer Paternal Grandmother     Stroke Neg Hx     Breast cancer Neg Hx     Colon cancer Neg Hx     Ovarian cancer Neg Hx          Review of Systems:  Review of Systems   Constitutional:  Positive for fatigue.   Respiratory:  Negative for chest tightness and shortness of breath.    Cardiovascular:  Negative for chest pain.   Gastrointestinal:         Dyspepsia   Musculoskeletal:  Positive for back pain.   All other systems reviewed and are negative.      Physical Exam:  Vital Signs:  Weight: 136 kg (300 lb)   Body mass index is 43.68 kg/m².  Temp: 97.8 °F (36.6 °C)   Heart Rate: 112   BP: 148/86     Physical Exam  Vitals reviewed.   Constitutional:       General: She is not in acute distress.     Appearance: Normal appearance. She is morbidly obese.   HENT:      Head: Normocephalic and atraumatic.      Mouth/Throat:      Mouth: Mucous membranes are moist.   Eyes:      General: No scleral icterus.     Extraocular Movements: Extraocular movements intact.      Conjunctiva/sclera: Conjunctivae normal.      Pupils: Pupils are equal, round, and reactive to light.   Cardiovascular:      Rate and Rhythm: Normal rate and regular rhythm.      Heart sounds: Normal heart sounds. No murmur heard.     No gallop.   Pulmonary:      Effort: Pulmonary effort is normal. No respiratory distress.      Breath sounds: Normal breath sounds.   Abdominal:      General: Bowel sounds are normal. There is no distension.      Palpations: Abdomen is soft. There is no mass.      Tenderness: There is no abdominal tenderness. There is no guarding.   Musculoskeletal:         General: Normal range of motion.      Cervical back: Normal range of motion and neck supple.   Skin:     General: Skin is warm and dry.   Neurological:      General: No focal deficit present.      Mental Status: She is alert and oriented to  person, place, and time.   Psychiatric:         Mood and Affect: Mood normal.         Behavior: Behavior normal.         Thought Content: Thought content normal.         Judgment: Judgment normal.            Assessment:         Purvi Foreman is a 28 y.o. year old female with medically complicated severe obesity. Weight: 136 kg (300 lb), Body mass index is 43.68 kg/m². and weight related problems including back pain, menstrual irregularities, and depression.    I explained in detail, potential surgical options of interest to the patient including the RNY gastric bypass, sleeve gastrectomy, and gastric band while considering the patient's medical history. At this time, the patient expressed interest in the Laparoscopic Sleeve  All of those procedures can be performed laparoscopically but there is a chance to convert to open if any technical challenges or complications do occur.  Bariatric surgery is not cosmetic surgery but rather a tool to help a patient make a life-long commitment lifestyle changes including diet, exercise, behavior changes, and taking supplemental vitamins and minerals.    Due to the patient's BMI, history, and co-morbidities related to potential surgical complications were evaluated. Due to Purvi Foreman's risk factors female will obtain the following prior to being scheduled for surgical intervention:    A letter of medical support as well as a history and physical must be obtained from her primary care provider. The patient was given a copy of a sample form, that will suffice as their letter to take to their primary are provider.    A referral for pre-operative psychological evaluation was ordered for the patient to evaluate candidacy as well as provide mental health support, should it be warranted before or after surgery.    Radiologic studies CXR 11/20/2023, Cardiology studies EKG 1/16/2024, and labs from 11/20/2023 as well as notes from primary care provider were reviewed  and   psychology clearance and EGD with biopsywere ordered at this time. These will be drawn and patient will be notified with results. Patient will complete new, pre-operative radiology prior to being scheduled for surgery.     Purvi Foreman was screened for sleep apnea in our office today and based on their results she is low risk. The risks, as they relate to chronic hypercapnia r/t untreated SRIDEVI were discussed with the patient and She verbalized understanding.     A pre-operative diagnostic esophagogastroduodenoscopy with biopsy for evaluation will be ordered and scheduled for this patient. The risks and benefits of the procedure were discussed with the patient in detail and all questions were answered.  Possibility of perforation, bleeding, aspiration, anoxic brain injury, respiratory and/or cardiac arrest and death were discussed.   She received handouts regarding, all questions were answered.     As this patient is a female of childbearing age she was counseled regarding conception and pregnancy after surgery. Patient was advised that conception and pregnancy within the first 18 months following surgery is not advised due to increased metabolic demands required during pregnancy as well as increased risk of nutrient and vitamin deficiencies which could jeopardize fetal development and birth weight.    The risks, benefits, alternatives, and potential complications of all of the sleeve gastrectomy explained in detail including, but not limited to death, anesthesia and medication adverse effect/DVT, pulmonary embolism, trocar site/incisional hernia, wound infection, abdominal infection, bleeding, failure to lose weight or gain weight and change in body image, metabolic complications with calcium, thiamine, vitamin B12, folate, iron, and anemia.    The patient was advised to start a high protein, low fat and low carbohydrate diet  start routine exercise including but not limited to 150 minutes per week. The  patient received a resistance band along with a handout of exercises. The patient was given individualized information by our dietician along with handouts.     The patient was given information regarding the TARSHA educational video. TARSHA is an internet based educational video which explains the sourgical procedure and answers basic questions regarding the procedure. The patient was provided with instructions and a password to watch the video.    The patient understands the surgical procedures and the different surgical options that are available.  She understands the lifestyle changes that would be required after surgery and has agreed to participate in a pre-operative and postoperative weight management program.  She also expressed understanding of possible risks, had several questions answered and desires to proceed.    I think she is a good candidate for this surgery, and is interested in a sleeve gastrectomy.    Encounter Diagnoses   Name Primary?    Morbid obesity with BMI of 40.0-44.9, adult Yes    Dyspepsia     Binge eating disorder     YUMI (generalized anxiety disorder)     Major depression, recurrent, full remission     Chronic bilateral low back pain without sciatica     Chronic fatigue     Dietary counseling        Plan:    Patient will be evaluated by a bariatric dietician psychologist before undergoing a multidisciplinary review of her candidacy. We discussed weight loss requirements prior to surgery and rationale, as well as other program requirements to ensure the safest approach to surgery. We spent time discussing different surgical procedures and plan of care throughout their lifespan to ensure long term success in achieving and maintaining a healthier weight. Patient will proceed with preoperative lab work, radiology, and endoscopy after obtaining agreed upon specialty, clearances, sleep study, and letter of support from her primary care provider.    Total time spent during this encounter today  was 60 minutes and includes preparing for the visit, reviewing tests, performing a medically appropriate examination and/or evaluations, counseling and educating the patient/family/caregiver, ordering medications, tests, or procedures, documenting information in the medical record, and independently interpreting results and communicating that information with the patient/family/caregiver  Gertrude Grant, APRN  1/17/2024

## 2024-01-19 ENCOUNTER — PATIENT ROUNDING (BHMG ONLY) (OUTPATIENT)
Dept: BARIATRICS/WEIGHT MGMT | Facility: CLINIC | Age: 29
End: 2024-01-19
Payer: COMMERCIAL

## 2024-01-19 NOTE — PROGRESS NOTES
Good afternoon,    My name is Regi Naidu, I am the Practice Manager for [facility] Bariatric Surgery.      I want to officially welcome you to our practice and ask about your recent visit.      If you could tell me about your recent visit with us. What things went well?      We're always looking for ways to make our patients experiences even better. Do you have recommendations on ways we may improve?      I appreciate you taking the time to answer a few questions today.      Thank you, and have a great day!        Message was sent to the patient via [employer] for PATIENT ROUNDING for [facility].

## 2024-01-22 RX ORDER — LISDEXAMFETAMINE DIMESYLATE CAPSULES 70 MG/1
70 CAPSULE ORAL EVERY MORNING
Qty: 30 CAPSULE | Refills: 0 | Status: SHIPPED | OUTPATIENT
Start: 2024-01-22

## 2024-01-22 NOTE — TELEPHONE ENCOUNTER
Refill request for controlled substance.      Date of request: 1/22/2024   Medication requested: Vyvanse   Last OV: 11/21/23  Last UDS: 3/14/23  Contract signed: yes    Date:7/11/23  Next office visit: 2/23/24    Dorita Dial

## 2024-01-24 NOTE — PROGRESS NOTES
"Well Woman Visit    CC: Scheduled annual well gyn visit  Chief Complaint   Patient presents with    Annual Exam       HPI:   28 y.o.   Social History     Substance and Sexual Activity   Sexual Activity Yes    Partners: Male    Birth control/protection: I.U.D.    Comment: Mirena 23     Office Visit with Gertrude Bower, DO (2022)  IGP,rfx Aptima HPV All Pth (2022 09:35) Pap neg    Hx of migraines w aura  Menses:   irreg, last menses 2023  Pain with menses:  None and no pelvic pain    Pt has no complaints today.  Has planned bariatric surgery soon.     PCP: does manage PMHx and preventative labs.  No recent lipid panel.  History: PMHx, Meds, Allergies, PSHx, Social Hx, and POBHx all reviewed and updated.    PHYSICAL EXAM:  /76   Ht 176.5 cm (69.49\")   Wt 136 kg (300 lb)   LMP 2023 Comment: None with Mirena  BMI 43.68 kg/m²  Not found.  General- NAD, alert and oriented, appropriate  Psych- Normal mood, good memory  Neck- No masses, no thyroid enlargement  CV- Regular rhythm, no murmurs  Resp- CTA to bases, no wheezes  Abdomen- Soft, non distended, non tender, no masses    Breast left-  Bilaterally symmetrical, no masses, non tender, no nipple discharge, no axillary or supraclavicular nodes palpable.    Breast right- Bilaterally symmetrical, no masses, non tender, no nipple discharge, no axillary or supraclavicular nodes palpable.       Chaperone present during pelvic exam.  External genitalia- Normal female, no lesions  Urethra/meatus- Normal, no masses, non tender  Bladder- Normal, no masses, non tender  Vagina- Normal, no atrophy, no lesions, no discharge.    Prolapse : none noted, not examined with split speculum to delineate  Cvx- Normal, no lesions, no discharge, No cervical motion tenderness, IUD strings visable 1.5cm  Uterus- Normal size, shape & consistency.  Non tender, mobile.  Adnexa- No mass, non tender  Anus/Rectum/Perineum- Not performed    Lymphatic- No palpable " neck, axillary, or groin nodes  Ext- No edema, no cyanosis    Skin- No lesions, no rashes, no acanthosis nigricans      ASSESSMENT and PLAN:    Diagnoses and all orders for this visit:    1. Well woman exam (Primary)  -     Lipid Panel; Future    2. IUD check up        Preventative:  BREAST HEALTH- Monthly self breast exam importance and how to reviewed. MMG and/or MRI (prn) reviewed per society guidelines and her individual history. Screen: Not medically needed  CERVICAL CANCER Screening- Reviewed current ASCCP guidelines for screening w and wo cotest HPV, age specific.  Screen: Already up to date  COLON CANCER Screening- Reviewed current medical society guidelines and options.  Screen:  Not medically needed  Importance of WEIGHT MANAGEMENT, nutrition, and exercise reviewed  BONE HEALTH- Reviewed current medical society guidelines and options for testing, calcium and vit D intake.  Weight bearing exercise.  DEXA: Not medically needed  VACCINATIONS Recommended: Covid vaccine, Flu annually.  Importance discussed, risk being unvaccinated reviewed.  Questions answered  Smoking status- NON SMOKER  Follow up PCP/Specialist PMHx and/or Labs          She understands the importance of having any ordered tests to be performed in a timely fashion.  The risks of not performing them include, but are not limited to, advanced cancer stages, bone loss from osteoporosis and/or subsequent increase in morbidity and/or mortality.  She is encouraged to review her results online and/or contact or office if she has questions.     Follow Up:  Return in about 1 year (around 1/26/2025) for WWE.            Gertrude Bower,   01/26/2024    [facility]  [address] [place] [address]  Dept: [phone]  Dept Fax: [phone]  Loc: [phone]  Loc Fax: [phone]

## 2024-01-25 PROBLEM — Z01.818 PREOPERATIVE TESTING: Status: ACTIVE | Noted: 2024-01-17

## 2024-01-26 ENCOUNTER — OFFICE VISIT (OUTPATIENT)
Dept: OBSTETRICS AND GYNECOLOGY | Facility: CLINIC | Age: 29
End: 2024-01-26
Payer: COMMERCIAL

## 2024-01-26 VITALS
WEIGHT: 293 LBS | SYSTOLIC BLOOD PRESSURE: 120 MMHG | HEIGHT: 69 IN | BODY MASS INDEX: 43.4 KG/M2 | DIASTOLIC BLOOD PRESSURE: 76 MMHG

## 2024-01-26 DIAGNOSIS — Z01.419 WELL WOMAN EXAM: Primary | ICD-10-CM

## 2024-01-26 DIAGNOSIS — Z30.431 IUD CHECK UP: ICD-10-CM

## 2024-01-29 ENCOUNTER — OFFICE VISIT (OUTPATIENT)
Dept: PSYCHIATRY | Facility: CLINIC | Age: 29
End: 2024-01-29
Payer: COMMERCIAL

## 2024-01-29 VITALS
OXYGEN SATURATION: 99 % | HEIGHT: 70 IN | HEART RATE: 97 BPM | SYSTOLIC BLOOD PRESSURE: 136 MMHG | DIASTOLIC BLOOD PRESSURE: 74 MMHG | BODY MASS INDEX: 43.05 KG/M2

## 2024-01-29 DIAGNOSIS — F50.81 BINGE EATING DISORDER: ICD-10-CM

## 2024-01-29 DIAGNOSIS — F33.0 MAJOR DEPRESSIVE DISORDER, RECURRENT, MILD: ICD-10-CM

## 2024-01-29 DIAGNOSIS — F33.42 MAJOR DEPRESSION, RECURRENT, FULL REMISSION: Primary | ICD-10-CM

## 2024-01-29 DIAGNOSIS — F41.1 GAD (GENERALIZED ANXIETY DISORDER): ICD-10-CM

## 2024-01-29 PROCEDURE — 99214 OFFICE O/P EST MOD 30 MIN: CPT | Performed by: PSYCHIATRY & NEUROLOGY

## 2024-01-29 RX ORDER — CLONIDINE HYDROCHLORIDE 0.1 MG/1
0.1 TABLET ORAL 2 TIMES DAILY PRN
Qty: 180 TABLET | Refills: 0 | Status: SHIPPED | OUTPATIENT
Start: 2024-01-29

## 2024-01-29 RX ORDER — BUPROPION HYDROCHLORIDE 300 MG/1
300 TABLET ORAL EVERY MORNING
Qty: 90 TABLET | Refills: 0 | Status: SHIPPED | OUTPATIENT
Start: 2024-01-29

## 2024-01-29 RX ORDER — BUSPIRONE HYDROCHLORIDE 10 MG/1
10 TABLET ORAL 3 TIMES DAILY
Qty: 270 TABLET | Refills: 0 | Status: SHIPPED | OUTPATIENT
Start: 2024-01-29

## 2024-01-29 NOTE — PROGRESS NOTES
"[facility]  Follow-up Visit    Chief Complaint: \"My  and I have three children... want to have another one... had my IUD taken out in October 2022... always had me on Zoloft... since I had my IUD taken out, I just haven't been myself... I have no libido, I don't have any energy... vitamin D... seemed to help a little... I saw Dr. De Leon... said I have depression... we need to put an IUD back in... and up it to 100... I'm on edge all the time...\"     History of Present Illness: Purvi Foreman is a 28 y.o. female who presents today for follow-up regarding MDD, YUMI. Last seen: 11/20 at which time no changes were made to her regimen; sertraline has been replaced by escitalopram in the interim of visits. She presents unaccompanied in no acute distress and engages with me appropriately. Psychotropic regimen perceived to be effective. Side-effects per given history: sedation with clonidine.    Current treatment regimen includes:   - escitalopram 20 mg QD  - bupropion  mg QD  - clonidine 0.1 mg BID PRN anxiety/irritability  - buspirone 10 mg TID (20 mg QAM and 10 mg QPM)  - via another provider: Vyvanse 70 mg QD for appetite suppression (issues with shortages), trazodone 50 mg HS PRN insomnia    Today she feels she's been doing better. She felt she was not doing well with sertraline, spoke with PCP about this and was changed to escitalopram, which has been \"a world of difference\". Anxiety symptoms are adequately managed with current interventions. Depression symptoms are adequately managed with current interventions. Considering weight loss surgery, EGD tomorrow. Binge eating disorder is adequately managed with her lisdexamfetamine, but excess weight does tend to contribute to psychological suffering due to associative constructs in the patient's thinking - note she's had issues with access to her lisdexamfetamine over the last few weeks. Thought process and content " are devoid of overt aberration suggestive of acute shannon/psychosis. The patient denies SI/HI/AVH. There are no overt changes on exam today compared to most recent evaluation.  - contextual changes: considering weight loss surgery, EGD tomorrow (plan to be set up from pre-op in the next few weeks)  - sleep: no change, adequate (trazodone continues to be helpful)  - appetite: reduced (intentional) with lisdexamfetamine    I have counseled the patient with regard to diagnoses and the recommended treatment regimen as documented below. Patient acknowledges the diagnoses per my rendered interpretation. Patient demonstrates understanding of potential risks/benefits/side effects associated with this regimen and is amenable to proceed in this fashion.     Psychotherapy  - Time: 8 minutes  - interventions employed: the therapeutic alliance was strengthened to encourage the patient to express their thoughts and feelings freely. Esteem building was enhanced through praise, reassurance, normalizing/challenging, and encouragement as appropriate. Coping skills were enhanced to build distress tolerance skills and emotional regulation. Allowed patient to freely discuss issues without interruption or judgement with unconditional positive regard, active listening skills, and empathy. Provided a safe, confidential environment to facilitate the development of a positive therapeutic relationship and encourage open, honest communication. Assisted patient in processing session content; acknowledged and normalized/addressed, as appropriate, patient’s thoughts, feelings, and concerns by utilizing a person-centered approach in efforts to build appropriate rapport and a positive therapeutic relationship with open and honest communication.   - Diagnoses: see assessment and plan below  - Symptoms: see subjective above  - Goals   - patient: sustain improvements to mood, bolster frustration tolerance, work to implement more time for herself and  respite in general   - provider: challenge patterns of living conducive to pathology, strengthen defenses, promote problems solving, restore adaptive functioning and provide symptom relief.  - Treatment plan: continue supportive psychotherapy in subsequent appointments to provide symptom relief; see assessment and plan below for additional details:   - iteration: 1   - progress: good   - (X)illumination, (X)contextualization, (X)detection, (working)development, (-)elaboration, (-)refinement  - functional status: improved  - mental status exam: as below  - prognosis: good    Psychiatric History:  Diagnoses: anxiety, depression, BED  Outpatient history: denies  Inpatient history: denies  Medication trials: fluoxetine, duloxetine  Other treatment modalities: saw a therapist at a young age (around 9 YO, time of parents' divorce)  Presenting regimen: Vyvanse 70 mg QD for appetite suppression, sertraline 100 mg QD, buspirone 7.5 mg TID  Self harm: denies  Suicide attempts: denies     Social History:  Residence: lives in a house with  ( 2018) and her three children (3 YO, 3 YO, 4 YO)  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[employer] (was [employer]; change was 09/2021 and since some of the volume has been more difficult to manage due to systemic issues)  Education: HS diploma, some college  Pertinent developmental history: denies; denies abuse hx, but dad left when patient was 4 YO and she didn't see him again until she was around 9 YO, mom was working three jobs (spent most of her time with her )  Pertinent legal history: denies  Hobbies/interests: played sports (tumbling, gymnastics); does spend a good amount of time outdoors (fishing, on boat), but isn't particularly interested in doing this  Anabaptism: Jewish  Exercise: 20 minutes every other day  Dietary habits: defer  Sleep hygiene: defer  Social habits: time constraints  Sunlight: no concern for under-exposure  Caffeine  intake: no pertinent issues; 1 can of coke daily, 40 oz of tea daily, rarely coffee  Hydration habits: no pertinent issues   history: denies    Social History     Socioeconomic History    Marital status:    Tobacco Use    Smoking status: Never    Smokeless tobacco: Never   Vaping Use    Vaping Use: Never used   Substance and Sexual Activity    Alcohol use: Yes     Comment: drinks rarely    Drug use: Never    Sexual activity: Yes     Partners: Male     Birth control/protection: I.U.D.     Comment: Mirena 4/4/23     Tobacco use counseling/intervention: N/A, patient does not use tobacco; patient has been counseled with regard to risks of tobacco use.    PHQ-9 Depression Screening  PHQ-9 Total Score: 7    Little interest or pleasure in doing things? 1-->several days   Feeling down, depressed, or hopeless? 1-->several days   Trouble falling or staying asleep, or sleeping too much? 1-->several days   Feeling tired or having little energy? 1-->several days   Poor appetite or overeating? 2-->more than half the days   Feeling bad about yourself - or that you are a failure or have let yourself or your family down? 1-->several days   Trouble concentrating on things, such as reading the newspaper or watching television? 0-->not at all   Moving or speaking so slowly that other people could have noticed? Or the opposite - being so fidgety or restless that you have been moving around a lot more than usual? 0-->not at all   Thoughts that you would be better off dead, or of hurting yourself in some way? 0-->not at all   PHQ-9 Total Score 7     Change in PHQ-9 since last measure: -8 (15)    YUMI-7  Feeling nervous, anxious or on edge: More than half the days  Not being able to stop or control worrying: More than half the days  Worrying too much about different things: More than half the days  Trouble Relaxing: More than half the days  Being so restless that it is hard to sit still: Nearly every day  Feeling afraid as if  something awful might happen: More than half the days  Becoming easily annoyed or irritable: More than half the days  YUMI 7 Total Score: 15  If you checked any problems, how difficult have these problems made it for you to do your work, take care of things at home, or get along with other people: Somewhat difficult    Change in YUMI-7 since last measure: -2 (17)    Problem List:  Patient Active Problem List   Diagnosis    Binge eating disorder    Abnormal thyroid blood test    YUMI (generalized anxiety disorder)    Low libido    Chronic fatigue    Irregular menses    Encounter for other contraceptive management    Morbid obesity with BMI of 40.0-44.9, adult    Major depression, recurrent, full remission    Snoring    Dyspepsia    Dietary counseling    Chronic low back pain    Preoperative testing     Allergy:   Allergies   Allergen Reactions    Penicillins Swelling and Rash    Latex Rash      Discontinued Medications:  There are no discontinued medications.    Current Medications:   Current Outpatient Medications   Medication Sig Dispense Refill    buPROPion XL (Wellbutrin XL) 300 MG 24 hr tablet Take 1 tablet by mouth Every Morning. 90 tablet 0    busPIRone (BUSPAR) 10 MG tablet Take 1 tablet by mouth 3 (Three) Times a Day. 270 tablet 0    Cholecalciferol (Vitamin D3) 50 MCG (2000 UT) tablet Take 1 tablet by mouth Daily.      cloNIDine (Catapres) 0.1 MG tablet Take 1 tablet by mouth 2 (Two) Times a Day As Needed (anxiety/irritability). 180 tablet 0    escitalopram (Lexapro) 20 MG tablet Take 1 tablet by mouth Daily. 90 tablet 1    Levonorgestrel (Mirena, 52 MG,) 20 MCG/DAY intrauterine device IUD 1 each by Intrauterine route.      levothyroxine (Synthroid) 25 MCG tablet Take 1 tablet by mouth Every Morning. 90 tablet 1    lisdexamfetamine (Vyvanse) 70 MG capsule Take 1 capsule by mouth Every Morning 30 capsule 0    traZODone (DESYREL) 50 MG tablet Take 1 tablet by mouth At Night As Needed for Sleep. 90 tablet 1      No current facility-administered medications for this visit.     Past Medical History:  Past Medical History:   Diagnosis Date    Anxiety     Depression     Disease of thyroid gland     Foot pain, bilateral     History of gestational hypertension     Hypertension     No meds    Migraine WITH aura     Seasonal allergies      Past Surgical History:  Past Surgical History:   Procedure Laterality Date    APPENDECTOMY  2012    RETAINED PLACENTA REMOVAL  2019    D+C w second tri loss     Mental Status Exam:   Appearance: well-groomed, sits upright, age-appropriate, above average habitus, tall  Behavior: calm, cooperative, appropriate in demeanor, appropriate eye-contact  Mood/affect: euthymic / mood-congruent, appropriate in both range and amplitude  Speech: within expected variance; appropriate rate, appropriate rhythm, appropriate tone; non-pressured  Thought Process: linear, goal-directed; no FOI or RADHA; abstraction intact  Thought Content: coherent, devoid of overt delusions/perceptual disturbances  SI/HI: denies both SI and HI; exhibits future-orientation, self-advocates appropriately, no regular self-harm, no appreciable intent  Memory: no overt deficits  Orientation: oriented to person/place/time/situation  Concentration: appropriate during interview  Intellectual capacity: presumptively average  Insight: fair by given history/exam  Judgment: appropriate by given history/exam  Psychomotor: appropriate  Gait: WNL    Review of Systems:  Review of Systems   Constitutional:  Negative for activity change, appetite change and unexpected weight change.   HENT:  Negative for drooling.    Eyes:  Negative for visual disturbance.   Respiratory:  Negative for chest tightness and shortness of breath.    Cardiovascular:  Negative for chest pain and palpitations.   Gastrointestinal:  Negative for abdominal pain, diarrhea and nausea.   Endocrine: Negative for cold intolerance and heat intolerance.   Genitourinary:  Negative  "for difficulty urinating and frequency.   Musculoskeletal:  Negative for myalgias and neck stiffness.   Skin:  Negative for rash.   Neurological:  Negative for dizziness, tremors, seizures and light-headedness.     Vital Signs:   /74   Pulse 97   Ht 177.8 cm (70\")   SpO2 99%   BMI 43.05 kg/m²      Lab Results:   Hospital Outpatient Visit on 01/16/2024   Component Date Value Ref Range Status    QT Interval 01/16/2024 372  ms Final    QTC Interval 01/16/2024 452  ms Final   Orders Only on 11/20/2023   Component Date Value Ref Range Status    WBC 11/20/2023 7.53  3.40 - 10.80 10*3/mm3 Final    RBC 11/20/2023 4.84  3.77 - 5.28 10*6/mm3 Final    Hemoglobin 11/20/2023 13.0  12.0 - 15.9 g/dL Final    Hematocrit 11/20/2023 39.4  34.0 - 46.6 % Final    MCV 11/20/2023 81.4  79.0 - 97.0 fL Final    MCH 11/20/2023 26.9  26.6 - 33.0 pg Final    MCHC 11/20/2023 33.0  31.5 - 35.7 g/dL Final    RDW 11/20/2023 13.1  12.3 - 15.4 % Final    RDW-SD 11/20/2023 38.1  37.0 - 54.0 fl Final    MPV 11/20/2023 10.6  6.0 - 12.0 fL Final    Platelets 11/20/2023 268  140 - 450 10*3/mm3 Final    Glucose 11/20/2023 92  65 - 99 mg/dL Final    BUN 11/20/2023 15  6 - 20 mg/dL Final    Creatinine 11/20/2023 0.95  0.57 - 1.00 mg/dL Final    Sodium 11/20/2023 140  136 - 145 mmol/L Final    Potassium 11/20/2023 4.1  3.5 - 5.2 mmol/L Final    Chloride 11/20/2023 102  98 - 107 mmol/L Final    CO2 11/20/2023 27.0  22.0 - 29.0 mmol/L Final    Calcium 11/20/2023 9.9  8.6 - 10.5 mg/dL Final    Total Protein 11/20/2023 7.5  6.0 - 8.5 g/dL Final    Albumin 11/20/2023 4.5  3.5 - 5.2 g/dL Final    ALT (SGPT) 11/20/2023 12  1 - 33 U/L Final    AST (SGOT) 11/20/2023 15  1 - 32 U/L Final    Alkaline Phosphatase 11/20/2023 98  39 - 117 U/L Final    Total Bilirubin 11/20/2023 0.4  0.0 - 1.2 mg/dL Final    Globulin 11/20/2023 3.0  gm/dL Final    A/G Ratio 11/20/2023 1.5  g/dL Final    BUN/Creatinine Ratio 11/20/2023 15.8  7.0 - 25.0 Final    Anion Gap " 11/20/2023 11.0  5.0 - 15.0 mmol/L Final    eGFR 11/20/2023 83.3  >60.0 mL/min/1.73 Final    Free T4 11/20/2023 1.14  0.93 - 1.70 ng/dL Final    TSH 11/20/2023 1.260  0.270 - 4.200 uIU/mL Final    Hemoglobin A1C 11/20/2023 5.20  4.80 - 5.60 % Final     EKG Results:  No orders to display     Imaging Results:  No Images in the past 120 days found.    ASSESSMENT AND PLAN:    ICD-10-CM ICD-9-CM   1. Major depression, recurrent, full remission  F33.42 296.36   2. YUMI (generalized anxiety disorder)  F41.1 300.02   3. Binge eating disorder  F50.81 307.50     28 y.o. female who presents today for follow-up regarding MDD, YUMI. We have discussed the interval history and the treatment plan below, including potential R/B/SE of the recommended regimen of which the patient demonstrates understanding. Patient is agreeable to call 911 or go to the nearest ER should she become concerned for her own safety and/or the safety of those around her. There are no overt indices of acute shannon/psychosis on exam today.     Medication regimen: continue escitalopram, bupropion XL, buspirone, clonidine; patient is advised not to misuse prescribed medications or to use them with any exogenous substances that aren't disclosed to this provider as they may interact with the regimen to the patient's detriment.   Risk Assessment: protracted risk is moderate, imminent risk is low - no interval change. Do note that this is subject to change with the Scientologist of new stressors, treatment non-adherence, use of substances, and/or new medical ails.   Monitoring: reviewed labs as populated above  Therapy: defer per patient preference  Follow-up: 3 months  Communications: N/A    TREATMENT PLAN/GOALS: challenge patterns of living conducive to symptom burden, implement recommended regimen as above with augmentative, intermittent supportive psychotherapy to reduce symptom burden. Patient acknowledged and verbally consented to continue treatment. The importance of  adherence to the recommended treatment and interval follow-up appointments was again emphasized today: patient has good treatment adherence per given history. Patient was today reminded to limit daily caffeine intake, hydrate appropriately, eat healthy and nutritious foods, engage sleep hygiene measures, engage appropriate exposure to sunlight, engage with hobbies in balance with life necessities, and exercise appropriate to their capacity to do so.     Billing: This encounter is of moderate complexity based on number/complexity of problems addressed today and risk of complications/morbidity: 2+ stable chronic illnesses and prescription management.     Parts of this note are electronic transcriptions/translations of spoken language to printed text using the Dragon Dictation system.    Electronically signed by Freddy Calvo MD, 01/29/24, [id number]

## 2024-01-30 ENCOUNTER — TELEPHONE (OUTPATIENT)
Dept: INTERNAL MEDICINE | Facility: CLINIC | Age: 29
End: 2024-01-30
Payer: COMMERCIAL

## 2024-01-30 ENCOUNTER — ANESTHESIA EVENT (OUTPATIENT)
Dept: GASTROENTEROLOGY | Facility: HOSPITAL | Age: 29
End: 2024-01-30
Payer: COMMERCIAL

## 2024-01-30 ENCOUNTER — HOSPITAL ENCOUNTER (OUTPATIENT)
Facility: HOSPITAL | Age: 29
Setting detail: HOSPITAL OUTPATIENT SURGERY
Discharge: HOME | End: 2024-01-30
Attending: SURGERY | Admitting: SURGERY
Payer: COMMERCIAL

## 2024-01-30 ENCOUNTER — ANESTHESIA (OUTPATIENT)
Dept: GASTROENTEROLOGY | Facility: HOSPITAL | Age: 29
End: 2024-01-30
Payer: COMMERCIAL

## 2024-01-30 VITALS
RESPIRATION RATE: 14 BRPM | HEIGHT: 70 IN | WEIGHT: 293 LBS | DIASTOLIC BLOOD PRESSURE: 72 MMHG | OXYGEN SATURATION: 99 % | HEART RATE: 78 BPM | SYSTOLIC BLOOD PRESSURE: 116 MMHG | BODY MASS INDEX: 41.95 KG/M2

## 2024-01-30 DIAGNOSIS — G89.29 CHRONIC BILATERAL LOW BACK PAIN WITHOUT SCIATICA: ICD-10-CM

## 2024-01-30 DIAGNOSIS — Z01.818 PREOPERATIVE TESTING: ICD-10-CM

## 2024-01-30 DIAGNOSIS — R10.13 DYSPEPSIA: ICD-10-CM

## 2024-01-30 DIAGNOSIS — M54.50 CHRONIC BILATERAL LOW BACK PAIN WITHOUT SCIATICA: ICD-10-CM

## 2024-01-30 DIAGNOSIS — F41.1 GAD (GENERALIZED ANXIETY DISORDER): ICD-10-CM

## 2024-01-30 DIAGNOSIS — E66.01 MORBID OBESITY WITH BMI OF 40.0-44.9, ADULT: ICD-10-CM

## 2024-01-30 DIAGNOSIS — F50.81 BINGE EATING DISORDER: ICD-10-CM

## 2024-01-30 LAB
B-HCG UR QL: NEGATIVE
EXPIRATION DATE: NORMAL
INTERNAL NEGATIVE CONTROL: NEGATIVE
INTERNAL POSITIVE CONTROL: POSITIVE
Lab: NORMAL

## 2024-01-30 PROCEDURE — 81025 URINE PREGNANCY TEST: CPT | Performed by: SURGERY

## 2024-01-30 PROCEDURE — 25010000002 PROPOFOL 10 MG/ML EMULSION: Performed by: NURSE ANESTHETIST, CERTIFIED REGISTERED

## 2024-01-30 PROCEDURE — 25810000003 LACTATED RINGERS PER 1000 ML: Performed by: SURGERY

## 2024-01-30 PROCEDURE — 87081 CULTURE SCREEN ONLY: CPT | Performed by: SURGERY

## 2024-01-30 RX ORDER — SODIUM CHLORIDE, SODIUM LACTATE, POTASSIUM CHLORIDE, CALCIUM CHLORIDE 600; 310; 30; 20 MG/100ML; MG/100ML; MG/100ML; MG/100ML
1000 INJECTION, SOLUTION INTRAVENOUS CONTINUOUS
Status: DISCONTINUED | OUTPATIENT
Start: 2024-01-30 | End: 2024-01-30 | Stop reason: HOSPADM

## 2024-01-30 RX ORDER — PROPOFOL 10 MG/ML
VIAL (ML) INTRAVENOUS AS NEEDED
Status: DISCONTINUED | OUTPATIENT
Start: 2024-01-30 | End: 2024-01-30 | Stop reason: SURG

## 2024-01-30 RX ORDER — LIDOCAINE HYDROCHLORIDE 20 MG/ML
INJECTION, SOLUTION INFILTRATION; PERINEURAL AS NEEDED
Status: DISCONTINUED | OUTPATIENT
Start: 2024-01-30 | End: 2024-01-30 | Stop reason: SURG

## 2024-01-30 RX ORDER — SODIUM CHLORIDE, SODIUM LACTATE, POTASSIUM CHLORIDE, CALCIUM CHLORIDE 600; 310; 30; 20 MG/100ML; MG/100ML; MG/100ML; MG/100ML
30 INJECTION, SOLUTION INTRAVENOUS CONTINUOUS
Status: DISCONTINUED | OUTPATIENT
Start: 2024-01-30 | End: 2024-01-30 | Stop reason: HOSPADM

## 2024-01-30 RX ADMIN — SODIUM CHLORIDE, POTASSIUM CHLORIDE, SODIUM LACTATE AND CALCIUM CHLORIDE 1000 ML: 600; 310; 30; 20 INJECTION, SOLUTION INTRAVENOUS at 08:57

## 2024-01-30 RX ADMIN — PROPOFOL 50 MG: 10 INJECTION, EMULSION INTRAVENOUS at 09:32

## 2024-01-30 RX ADMIN — LIDOCAINE HYDROCHLORIDE 60 MG: 20 INJECTION, SOLUTION INFILTRATION; PERINEURAL at 09:31

## 2024-01-30 RX ADMIN — PROPOFOL 100 MG: 10 INJECTION, EMULSION INTRAVENOUS at 09:31

## 2024-01-30 NOTE — ANESTHESIA POSTPROCEDURE EVALUATION
Patient: Purvi Foreman    Procedure Summary       Date: 01/30/24 Room / Location: [facility] ENDOSCOPY 1 / [facility] ENDOSCOPY    Anesthesia Start: 0923 Anesthesia Stop: 0942    Procedure: ESOPHAGOGASTRODUODENOSCOPY WITH BIOPSY (Esophagus) Diagnosis:       Morbid obesity with BMI of 40.0-44.9, adult      Dyspepsia      Binge eating disorder      YUMI (generalized anxiety disorder)      Chronic bilateral low back pain without sciatica      Preoperative testing      (Morbid obesity with BMI of 40.0-44.9, adult [E66.01, Z68.41])      (Dyspepsia [R10.13])      (Binge eating disorder [F50.81])      (YUMI (generalized anxiety disorder) [F41.1])      (Chronic bilateral low back pain without sciatica [M54.50, G89.29])      (Preoperative testing [Z01.818])    Surgeons: Josue Moore Jr., MD Provider: Andrés Ying MD    Anesthesia Type: MAC ASA Status: 3            Anesthesia Type: MAC    Vitals  Vitals Value Taken Time   /72 01/30/24 1000   Temp     Pulse 78 01/30/24 1000   Resp 14 01/30/24 1000   SpO2 99 % 01/30/24 1000           Post Anesthesia Care and Evaluation    Patient location during evaluation: PACU  Patient participation: complete - patient participated  Level of consciousness: awake and alert  Pain management: adequate    Airway patency: patent  Anesthetic complications: No anesthetic complications    Cardiovascular status: acceptable  Respiratory status: acceptable  Hydration status: acceptable    Comments: --------------------            01/30/24               1000     --------------------   BP:       116/72     Pulse:      78       Resp:       14       SpO2:      99%      --------------------

## 2024-01-30 NOTE — TELEPHONE ENCOUNTER
WANTING TO KNOW IF THE BARIATRIC FORM CAN BE COMPLETED TODAY?  IT IS IN HER CHART BUT NOT FILLED OUT?

## 2024-01-30 NOTE — OP NOTE
Surgeon: Josue Moore Jr., M.D.    Preoperative Diagnosis: Dyspepsia    Postoperative Diagnosis: #1 gastritis #2 small hiatal hernia    Procedure Performed: Transoral esophagogastroduodenoscopy with gastric antral biopsies    Indications: 28-year-old female who presents for preoperative evaluation.  Patient does not take H2 blocker or PPI on regular basis    Procedure:     The procedure, indications, preparation and potential complications were explained to the patient, who indicated understanding and signed the corresponding consent forms.  The patient was identified, taken to the endoscopy suite, and placed on the left side down decubitus position.  The patient underwent a MAC anesthesia and was appropriately monitored through the case by the anesthesia personnel with continuous pulse oximetry, blood pressure, and cardiac monitoring.  A bite block was placed.  After adequate IV sedation and using a transoral technique a lubed flexible endoscope was placed in the hypopharynx and advanced to the second portion of the duodenum without difficulty. The scope was then withdrawn back into the antrum of the stomach.  Cold forcep biopsies of the antrum were taken to rule out Helicobacter pylori.  The scope was retroflexed noting the body, fundus and cardia.  The scope was then withdrawn back into the esophagus after decompressing the stomach.  The Z line was noted and GE junction measured from the incisors.  The scope was then completely withdrawn.  The patient tolerated the procedure well and left the endoscopy suite in stable condition.  The findings are listed below.    Duodenum: Unremarkable  Antrum: Minimal patchy erythema  Body/Fundus: Unremarkable  Cardia: Small defect  Esophagus: Z-line regular, no erosive esophagitis    Recommendations:     Await biopsy results and follow-up in the office

## 2024-01-30 NOTE — ANESTHESIA PREPROCEDURE EVALUATION
Anesthesia Evaluation     Patient summary reviewed and Nursing notes reviewed                Airway   Mallampati: III  TM distance: >3 FB  Neck ROM: full  Dental      Pulmonary - negative pulmonary ROS   Cardiovascular - negative cardio ROS    ECG reviewed  Rhythm: regular  Rate: normal    (+) hypertension      Neuro/Psych- negative ROS  (+) headaches, psychiatric history Anxiety and Depression  GI/Hepatic/Renal/Endo - negative ROS   (+) morbid obesity, thyroid problem hypothyroidism    Musculoskeletal (-) negative ROS    Abdominal    Substance History - negative use  (+) alcohol use     OB/GYN negative ob/gyn ROS         Other                    Anesthesia Plan    ASA 3     MAC     intravenous induction     Anesthetic plan, risks, benefits, and alternatives have been provided, discussed and informed consent has been obtained with: patient.    Plan discussed with CRNA.    CODE STATUS:

## 2024-01-31 ENCOUNTER — LAB (OUTPATIENT)
Dept: OBSTETRICS AND GYNECOLOGY | Facility: CLINIC | Age: 29
End: 2024-01-31
Payer: COMMERCIAL

## 2024-01-31 DIAGNOSIS — Z01.419 WELL WOMAN EXAM: ICD-10-CM

## 2024-01-31 LAB
CHOLEST SERPL-MCNC: 162 MG/DL (ref 0–200)
HDLC SERPL-MCNC: 45 MG/DL (ref 40–60)
LDLC SERPL CALC-MCNC: 101 MG/DL (ref 0–100)
LDLC/HDLC SERPL: 2.21 {RATIO}
TRIGL SERPL-MCNC: 87 MG/DL (ref 0–150)
UREASE TISS QL: NEGATIVE
VLDLC SERPL-MCNC: 16 MG/DL (ref 5–40)

## 2024-01-31 PROCEDURE — 80061 LIPID PANEL: CPT | Performed by: OBSTETRICS & GYNECOLOGY

## 2024-02-06 ENCOUNTER — PATIENT MESSAGE (OUTPATIENT)
Dept: INTERNAL MEDICINE | Facility: CLINIC | Age: 29
End: 2024-02-06
Payer: COMMERCIAL

## 2024-02-06 RX ORDER — SEMAGLUTIDE 0.25 MG/.5ML
0.25 INJECTION, SOLUTION SUBCUTANEOUS WEEKLY
Qty: 2 ML | Refills: 0 | Status: SHIPPED | OUTPATIENT
Start: 2024-02-06

## 2024-02-06 NOTE — TELEPHONE ENCOUNTER
From: Purvi Foreman  To: Kendrick Thompson  Sent: 2/6/2024 8:35 AM EST  Subject: Wegovy     Vyvanse is out of stock at [facility].. again. I’ve been without it for roughly 2weeks if not longer because [facility] said they would have it within 2weeks.. which clearly was incorrect information.   Would wegovy be an option for me to try till I have my pre-op appointment for bariatric with ? If so, could it be sent in to [facility]?     Thanks a bunch!! I appreciate you!

## 2024-02-12 ENCOUNTER — PRIOR AUTHORIZATION (OUTPATIENT)
Dept: INTERNAL MEDICINE | Facility: CLINIC | Age: 29
End: 2024-02-12
Payer: COMMERCIAL

## 2024-02-20 ENCOUNTER — PATIENT MESSAGE (OUTPATIENT)
Dept: INTERNAL MEDICINE | Facility: CLINIC | Age: 29
End: 2024-02-20
Payer: COMMERCIAL

## 2024-02-20 RX ORDER — ONDANSETRON 4 MG/1
4 TABLET, FILM COATED ORAL EVERY 8 HOURS PRN
Qty: 60 TABLET | Refills: 0 | Status: SHIPPED | OUTPATIENT
Start: 2024-02-20

## 2024-02-20 NOTE — TELEPHONE ENCOUNTER
From: Purvi Foreman  To: Kendrick Thompson  Sent: 2/20/2024 10:21 AM EST  Subject: Nausea w/ Wegovy    I started Wegovy Friday morning and the nausea is horrific. Would you be able to send in Zofran to [facility] for me, please?     Thanks!

## 2024-02-22 ENCOUNTER — CONSULT (OUTPATIENT)
Dept: BARIATRICS/WEIGHT MGMT | Facility: CLINIC | Age: 29
End: 2024-02-22
Payer: COMMERCIAL

## 2024-02-22 VITALS
TEMPERATURE: 97.1 F | WEIGHT: 293 LBS | SYSTOLIC BLOOD PRESSURE: 125 MMHG | DIASTOLIC BLOOD PRESSURE: 75 MMHG | HEART RATE: 80 BPM | HEIGHT: 69 IN | BODY MASS INDEX: 43.4 KG/M2

## 2024-02-22 DIAGNOSIS — Z71.3 DIETARY COUNSELING: ICD-10-CM

## 2024-02-22 DIAGNOSIS — K44.9 HIATAL HERNIA: ICD-10-CM

## 2024-02-22 DIAGNOSIS — G89.29 CHRONIC LOW BACK PAIN, UNSPECIFIED BACK PAIN LATERALITY, UNSPECIFIED WHETHER SCIATICA PRESENT: ICD-10-CM

## 2024-02-22 DIAGNOSIS — M54.50 CHRONIC LOW BACK PAIN, UNSPECIFIED BACK PAIN LATERALITY, UNSPECIFIED WHETHER SCIATICA PRESENT: ICD-10-CM

## 2024-02-22 DIAGNOSIS — R53.82 CHRONIC FATIGUE: ICD-10-CM

## 2024-02-22 DIAGNOSIS — E66.01 OBESITY, CLASS III, BMI 40-49.9 (MORBID OBESITY): Primary | ICD-10-CM

## 2024-02-22 DIAGNOSIS — F41.1 GAD (GENERALIZED ANXIETY DISORDER): ICD-10-CM

## 2024-02-22 DIAGNOSIS — R10.13 DYSPEPSIA: ICD-10-CM

## 2024-02-22 PROBLEM — F50.81 BINGE EATING DISORDER: Status: RESOLVED | Noted: 2021-12-02 | Resolved: 2024-02-22

## 2024-02-22 PROBLEM — F50.819 BINGE EATING DISORDER: Status: RESOLVED | Noted: 2021-12-02 | Resolved: 2024-02-22

## 2024-02-22 PROBLEM — E66.813 OBESITY, CLASS III, BMI 40-49.9 (MORBID OBESITY): Status: ACTIVE | Noted: 2023-04-06

## 2024-02-22 PROBLEM — Z01.818 PREOPERATIVE TESTING: Status: RESOLVED | Noted: 2024-01-17 | Resolved: 2024-02-22

## 2024-02-22 RX ORDER — URSODIOL 300 MG/1
300 CAPSULE ORAL 2 TIMES DAILY
Qty: 60 CAPSULE | Refills: 5 | Status: SHIPPED | OUTPATIENT
Start: 2024-02-22

## 2024-02-22 RX ORDER — ENOXAPARIN SODIUM 100 MG/ML
40 INJECTION SUBCUTANEOUS EVERY 12 HOURS SCHEDULED
Qty: 11.2 ML | Refills: 0 | Status: SHIPPED | OUTPATIENT
Start: 2024-02-22 | End: 2024-03-07

## 2024-02-22 NOTE — PATIENT INSTRUCTIONS
Bariatric Manual    You were provided a manual specific to the procedure that you have chosen.  Please refer to that with any questions or call the office at [phone]

## 2024-02-22 NOTE — H&P
Bariatric Consult:  Referred by Kendrick Thompson Jr., MD    Purvi Foreman is here today for consult on Consult (Sleeve consult )      History of Present Illness:     Purvi Foreman is a 28 y.o. female with morbid obesity with co-morbidities including hypertension and depression who presents for surgical consultation for the above procedure. Purvi has completed the initial intake visit and has been examined by our nurse practitioner, dietician, psychologist and underwent the extensive educational teaching process under the guidance of our bariatric coordinator and myself. Purvi also has seen or if has not yet will see the educational video TARSHA on the surgical procedure if available. Purvi attended today more educational teaching from our bariatric coordinator and myself. Purvi has had an extensive medical workup including a visit with their primary care physician, EKG, chest radiograph, blood work, EGD or UGI and possibly further testing. These have been reviewed by me and discussed with the patient. Purvi is now ready to proceed with surgery. Purvi presently denies nausea, vomiting, fever, chills, chest pain, shortness of air, melena, hematochezia, hemetemesis, dysuria, frequency, hematuria.     Past Medical History:   Diagnosis Date    Anxiety     Depression     Disease of thyroid gland     Foot pain, bilateral     History of gestational hypertension     Hypertension     No meds    Migraine WITH aura     Seasonal allergies        Encounter Diagnoses   Name Primary?    Obesity, Class III, BMI 40-49.9 (morbid obesity) Yes    Chronic low back pain, unspecified back pain laterality, unspecified whether sciatica present     Chronic fatigue     Dietary counseling     YUMI (generalized anxiety disorder)     Hiatal hernia     Dyspepsia        Past Surgical History:   Procedure Laterality Date    APPENDECTOMY  2012    ENDOSCOPY N/A 1/30/2024    Procedure: ESOPHAGOGASTRODUODENOSCOPY WITH  BIOPSY;  Surgeon: Josue Moore Jr., MD;  Location: [facility] ENDOSCOPY;  Service: General;  Laterality: N/A;  PRE- DYSPEPSIA  POST- GASTRITIS, HIATAL HERNIA    RETAINED PLACENTA REMOVAL  2019    D+C w second tri loss         * No active hospital problems. *      Allergies   Allergen Reactions    Penicillins Swelling and Rash    Latex Rash         Current Outpatient Medications:     buPROPion XL (Wellbutrin XL) 300 MG 24 hr tablet, Take 1 tablet by mouth Every Morning., Disp: 90 tablet, Rfl: 0    busPIRone (BUSPAR) 10 MG tablet, Take 1 tablet by mouth 3 (Three) Times a Day., Disp: 270 tablet, Rfl: 0    Cholecalciferol (Vitamin D3) 50 MCG (2000 UT) tablet, Take 1 tablet by mouth Daily., Disp: , Rfl:     cloNIDine (Catapres) 0.1 MG tablet, Take 1 tablet by mouth 2 (Two) Times a Day As Needed (anxiety/irritability)., Disp: 180 tablet, Rfl: 0    Enoxaparin Sodium (LOVENOX) 40 MG/0.4ML solution prefilled syringe syringe, Inject 0.4 mL under the skin into the appropriate area as directed Every 12 (Twelve) Hours for 14 days. Start after surgery unless instructed otherwise, Disp: 11.2 mL, Rfl: 0    escitalopram (Lexapro) 20 MG tablet, Take 1 tablet by mouth Daily., Disp: 90 tablet, Rfl: 1    folic acid-vit B6-vit B12 (FOLBEE) 2.5-25-1 MG tablet tablet, Take 1 tablet by mouth Daily., Disp: 40 tablet, Rfl: 0    Levonorgestrel (Mirena, 52 MG,) 20 MCG/DAY intrauterine device IUD, 1 each by Intrauterine route., Disp: , Rfl:     levothyroxine (Synthroid) 25 MCG tablet, Take 1 tablet by mouth Every Morning., Disp: 90 tablet, Rfl: 1    ondansetron (Zofran) 4 MG tablet, Take 1 tablet by mouth Every 8 (Eight) Hours As Needed for Nausea or Vomiting., Disp: 60 tablet, Rfl: 0    traZODone (DESYREL) 50 MG tablet, Take 1 tablet by mouth At Night As Needed for Sleep., Disp: 90 tablet, Rfl: 1    ursodiol (Actigall) 300 MG capsule, Take 1 capsule by mouth 2 (Two) Times a Day., Disp: 60 capsule, Rfl: 5    Social History     Socioeconomic  History    Marital status:    Tobacco Use    Smoking status: Never    Smokeless tobacco: Never   Vaping Use    Vaping Use: Never used   Substance and Sexual Activity    Alcohol use: Not Currently     Comment: drinks rarely    Drug use: Never    Sexual activity: Yes     Partners: Male     Birth control/protection: I.U.D.     Comment: Mirena 4/4/23       Family History   Problem Relation Age of Onset    Uterine cancer Mother         endometrial hyperplasia s/p hyst    Endometrial cancer Mother         HYPERPLASIA    Hypertension Mother     Hypertension Father     Diabetes Father     Esophageal cancer Maternal Grandfather     Lung cancer Paternal Grandmother     Stroke Neg Hx     Breast cancer Neg Hx     Colon cancer Neg Hx     Ovarian cancer Neg Hx     Pulmonary embolism Neg Hx     Deep vein thrombosis Neg Hx     Malig Hyperthermia Neg Hx        Review of Systems:  Review of Systems   Constitutional:  Positive for fatigue.   Gastrointestinal:  Positive for constipation.   Musculoskeletal:  Positive for arthralgias and back pain.   All other systems reviewed and are negative.      Physical Exam:  Vital Signs:  Weight: (!) 142 kg (312 lb)   Body mass index is 45.43 kg/m².  Temp: 97.1 °F (36.2 °C)   Heart Rate: 80   BP: 125/75     Physical Exam  Vitals reviewed.   HENT:      Head: Normocephalic and atraumatic.      Mouth/Throat:      Mouth: Mucous membranes are moist.      Pharynx: Oropharynx is clear.   Eyes:      General: No scleral icterus.     Extraocular Movements: Extraocular movements intact.      Conjunctiva/sclera: Conjunctivae normal.      Pupils: Pupils are equal, round, and reactive to light.   Neck:      Thyroid: No thyromegaly.   Cardiovascular:      Rate and Rhythm: Normal rate.   Pulmonary:      Effort: Pulmonary effort is normal. No respiratory distress.      Breath sounds: Normal breath sounds. No stridor. No wheezing or rhonchi.   Abdominal:      General: Bowel sounds are normal.       Palpations: Abdomen is soft.      Tenderness: There is no abdominal tenderness. There is no right CVA tenderness, left CVA tenderness, guarding or rebound.      Hernia: No hernia is present.   Musculoskeletal:         General: Normal range of motion.      Cervical back: Normal range of motion and neck supple.   Lymphadenopathy:      Cervical: No cervical adenopathy.   Skin:     General: Skin is warm and dry.      Findings: No erythema.   Neurological:      Mental Status: She is alert and oriented to person, place, and time.   Psychiatric:         Mood and Affect: Mood normal.         Behavior: Behavior normal.         Thought Content: Thought content normal.         Judgment: Judgment normal.         Assessment:    Purvi Foreman is a 28 y.o. year old female with medically complicated severe obesity with a BMI of Body mass index is 45.43 kg/m². and multiple co-morbidities listed in the encounter diagnosis.    I think she is an appropriate candidate for this surgery, and is ready to proceed.    Plan/Discussion/Summary:  Small hiatal hernia per me.  No PPI.  H. pylori negative    The patient has returned to the office for a surgical consultation and has requested to proceed with gastric sleeve.  I have had the opportunity to obtain a history, examine the patient and review the patient's chart. The procedure could be done laparoscopically and or robotically.    The patient understands that surgery is a tool and that weight loss is not guaranteed but only seen in the context of appropriate use, regular follow up, exercise and making appropriate food choices.     I personally discussed the potential complications of the laparoscopic gastric sleeve with this patient.  The patient is well aware of potential complications of the surgery that include but not limited to bleeding, infections, deep vein thrombosis, pulmonary embolism, pulmonary complications such as pneumonia, cardiac event, hernias, small bowel  obstruction, damage to the spleen or other organs, bowel injury, disfiguring scars, failure to lose weight, need for additional surgery, conversion to an open procedure and death.  The patient is also aware of complications which apply in particular to the gastric sleeve and can include but not limited to the leakage of gastric contents at the staple line, the development of an intra-abdominal abscess, gastroesophageal reflux disease, Miller's esophagus, ulcers, vitamin/mineral deficiencies, strictures, and the possibility of converting this procedure to a Ty-en-Y gastric bypass. The patient also understands the possibility of requiring an acid reducer medication for the rest of their life.    The risks, benefits, potential complications and alternative therapies were discussed at great length as outlined in our extensive consent forms, online consent and educational teaching processes.    The patient has confirmed the participation in the programs extensive educational activities.    All questions and concerns were answered to patient's satisfaction.  The patient now wishes to proceed with surgery.     The patient has agreed to a postoperative course of anitcoagulant therapy.      I instructed patient that the surgery could be laparoscopically and/or robotically.    I instructed patient to start on a H2 blocker or proton pump inhibitor if not already on one of these medications.    I explained in detail the procedures that are in the consent.  All of these procedures have a chance to convert to open if any technical challenges or complications do occur.  Bariatric surgery is not cosmetic surgery but rather a tool to help a patient make a life-long commitment lifestyle change including diet, exercise, behavior changes, and taking supplemental vitamins and minerals.    Problems after surgery may require more operations to correct them.    The risks, benefits, alternatives, and potential complications of all of the  procedures were explained in detail including, but not limited to death, anesthesia and medication adverse effect, deep venous thrombosis, pulmonary embolism, trocar site/incisional hernia, wound infection, abdominal infection, bleeding, failure to lose weight, gain weight, a change in body image, metabolic complications with vitamin deficiences and anemia.    Weight loss expectations were discussed with the patient in detail. The weight loss operations most commonly performed are the sleeve gastrectomy and the Ty-en-Y gastric bypass. These operations result in weight losses up to approximately 25-35% of initial body weight 12 to 24 months after surgery with the gastric bypass usually the higher percent of weight loss but depends on patient using the tool.    For the gastric bypass and loop duodenal switch (MARGRET-S) the risks include but not limited to the following early complications:  Anastomotic leak/peritonitis, Ty/Alimentary/biliopancreatic limb obstruction, severe & minor wound infection/seroma, and nausea/vomiting.  Late complications can include but are not limited to malnutrition, vitamin deficiencies, frequent loose stools,  stomal stenosis, marginal ulcer, bowel obstruction, intussusception, internal, and incisional hernia.    Regarding the gastric sleeve, there is higher risk of dysphagia and reflux leading to possible Miller's esophagus compared to a gastric bypass, as well as risk of internal visceral/organ injury, splenectomy, bleeding, infection, leak (which could require further intervention possible conversion to Ty-en-Y gastric bypass), stenosis and possibility of regaining weight.    Purvi was counseled regarding diagnostic results, instructions for management, risk factor reductions, prognosis, patient and family education, impressions, risks and benefits of treatment options and importance of compliance with treatment. Total time of the encounter was over 45 minutes counseling the  patient regarding the procedure as above and reviewing as well as ordering labs, medications and the procedure.  The chart was also reviewed prior to seeing the patient reviewing previous testing, studies and labs.    Purvi understands the surgical procedures and the different surgical options that are available.  She understands the lifestyle changes that are required after surgery and has agreed to follow the guidelines outlined in the weight management program.  She also expressed understanding of the risks involved and had all of female questions answered and desires to proceed.      Josue Moore MD  2/22/2024

## 2024-02-22 NOTE — H&P (VIEW-ONLY)
Bariatric Consult:  Referred by Kendrick Thompson Jr., MD    Purvi Foreman is here today for consult on Consult (Sleeve consult )      History of Present Illness:     Purvi Foreman is a 28 y.o. female with morbid obesity with co-morbidities including hypertension and depression who presents for surgical consultation for the above procedure. Purvi has completed the initial intake visit and has been examined by our nurse practitioner, dietician, psychologist and underwent the extensive educational teaching process under the guidance of our bariatric coordinator and myself. Purvi also has seen or if has not yet will see the educational video TARSHA on the surgical procedure if available. Purvi attended today more educational teaching from our bariatric coordinator and myself. Purvi has had an extensive medical workup including a visit with their primary care physician, EKG, chest radiograph, blood work, EGD or UGI and possibly further testing. These have been reviewed by me and discussed with the patient. Purvi is now ready to proceed with surgery. Purvi presently denies nausea, vomiting, fever, chills, chest pain, shortness of air, melena, hematochezia, hemetemesis, dysuria, frequency, hematuria.     Past Medical History:   Diagnosis Date    Anxiety     Depression     Disease of thyroid gland     Foot pain, bilateral     History of gestational hypertension     Hypertension     No meds    Migraine WITH aura     Seasonal allergies        Encounter Diagnoses   Name Primary?    Obesity, Class III, BMI 40-49.9 (morbid obesity) Yes    Chronic low back pain, unspecified back pain laterality, unspecified whether sciatica present     Chronic fatigue     Dietary counseling     YUMI (generalized anxiety disorder)     Hiatal hernia     Dyspepsia        Past Surgical History:   Procedure Laterality Date    APPENDECTOMY  2012    ENDOSCOPY N/A 1/30/2024    Procedure: ESOPHAGOGASTRODUODENOSCOPY WITH  BIOPSY;  Surgeon: Josue Moore Jr., MD;  Location: [facility] ENDOSCOPY;  Service: General;  Laterality: N/A;  PRE- DYSPEPSIA  POST- GASTRITIS, HIATAL HERNIA    RETAINED PLACENTA REMOVAL  2019    D+C w second tri loss         * No active hospital problems. *      Allergies   Allergen Reactions    Penicillins Swelling and Rash    Latex Rash         Current Outpatient Medications:     buPROPion XL (Wellbutrin XL) 300 MG 24 hr tablet, Take 1 tablet by mouth Every Morning., Disp: 90 tablet, Rfl: 0    busPIRone (BUSPAR) 10 MG tablet, Take 1 tablet by mouth 3 (Three) Times a Day., Disp: 270 tablet, Rfl: 0    Cholecalciferol (Vitamin D3) 50 MCG (2000 UT) tablet, Take 1 tablet by mouth Daily., Disp: , Rfl:     cloNIDine (Catapres) 0.1 MG tablet, Take 1 tablet by mouth 2 (Two) Times a Day As Needed (anxiety/irritability)., Disp: 180 tablet, Rfl: 0    Enoxaparin Sodium (LOVENOX) 40 MG/0.4ML solution prefilled syringe syringe, Inject 0.4 mL under the skin into the appropriate area as directed Every 12 (Twelve) Hours for 14 days. Start after surgery unless instructed otherwise, Disp: 11.2 mL, Rfl: 0    escitalopram (Lexapro) 20 MG tablet, Take 1 tablet by mouth Daily., Disp: 90 tablet, Rfl: 1    folic acid-vit B6-vit B12 (FOLBEE) 2.5-25-1 MG tablet tablet, Take 1 tablet by mouth Daily., Disp: 40 tablet, Rfl: 0    Levonorgestrel (Mirena, 52 MG,) 20 MCG/DAY intrauterine device IUD, 1 each by Intrauterine route., Disp: , Rfl:     levothyroxine (Synthroid) 25 MCG tablet, Take 1 tablet by mouth Every Morning., Disp: 90 tablet, Rfl: 1    ondansetron (Zofran) 4 MG tablet, Take 1 tablet by mouth Every 8 (Eight) Hours As Needed for Nausea or Vomiting., Disp: 60 tablet, Rfl: 0    traZODone (DESYREL) 50 MG tablet, Take 1 tablet by mouth At Night As Needed for Sleep., Disp: 90 tablet, Rfl: 1    ursodiol (Actigall) 300 MG capsule, Take 1 capsule by mouth 2 (Two) Times a Day., Disp: 60 capsule, Rfl: 5    Social History     Socioeconomic  History    Marital status:    Tobacco Use    Smoking status: Never    Smokeless tobacco: Never   Vaping Use    Vaping Use: Never used   Substance and Sexual Activity    Alcohol use: Not Currently     Comment: drinks rarely    Drug use: Never    Sexual activity: Yes     Partners: Male     Birth control/protection: I.U.D.     Comment: Mirena 4/4/23       Family History   Problem Relation Age of Onset    Uterine cancer Mother         endometrial hyperplasia s/p hyst    Endometrial cancer Mother         HYPERPLASIA    Hypertension Mother     Hypertension Father     Diabetes Father     Esophageal cancer Maternal Grandfather     Lung cancer Paternal Grandmother     Stroke Neg Hx     Breast cancer Neg Hx     Colon cancer Neg Hx     Ovarian cancer Neg Hx     Pulmonary embolism Neg Hx     Deep vein thrombosis Neg Hx     Malig Hyperthermia Neg Hx        Review of Systems:  Review of Systems   Constitutional:  Positive for fatigue.   Gastrointestinal:  Positive for constipation.   Musculoskeletal:  Positive for arthralgias and back pain.   All other systems reviewed and are negative.      Physical Exam:  Vital Signs:  Weight: (!) 142 kg (312 lb)   Body mass index is 45.43 kg/m².  Temp: 97.1 °F (36.2 °C)   Heart Rate: 80   BP: 125/75     Physical Exam  Vitals reviewed.   HENT:      Head: Normocephalic and atraumatic.      Mouth/Throat:      Mouth: Mucous membranes are moist.      Pharynx: Oropharynx is clear.   Eyes:      General: No scleral icterus.     Extraocular Movements: Extraocular movements intact.      Conjunctiva/sclera: Conjunctivae normal.      Pupils: Pupils are equal, round, and reactive to light.   Neck:      Thyroid: No thyromegaly.   Cardiovascular:      Rate and Rhythm: Normal rate.   Pulmonary:      Effort: Pulmonary effort is normal. No respiratory distress.      Breath sounds: Normal breath sounds. No stridor. No wheezing or rhonchi.   Abdominal:      General: Bowel sounds are normal.       Palpations: Abdomen is soft.      Tenderness: There is no abdominal tenderness. There is no right CVA tenderness, left CVA tenderness, guarding or rebound.      Hernia: No hernia is present.   Musculoskeletal:         General: Normal range of motion.      Cervical back: Normal range of motion and neck supple.   Lymphadenopathy:      Cervical: No cervical adenopathy.   Skin:     General: Skin is warm and dry.      Findings: No erythema.   Neurological:      Mental Status: She is alert and oriented to person, place, and time.   Psychiatric:         Mood and Affect: Mood normal.         Behavior: Behavior normal.         Thought Content: Thought content normal.         Judgment: Judgment normal.         Assessment:    Purvi Foreman is a 28 y.o. year old female with medically complicated severe obesity with a BMI of Body mass index is 45.43 kg/m². and multiple co-morbidities listed in the encounter diagnosis.    I think she is an appropriate candidate for this surgery, and is ready to proceed.    Plan/Discussion/Summary:  Small hiatal hernia per me.  No PPI.  H. pylori negative    The patient has returned to the office for a surgical consultation and has requested to proceed with gastric sleeve.  I have had the opportunity to obtain a history, examine the patient and review the patient's chart. The procedure could be done laparoscopically and or robotically.    The patient understands that surgery is a tool and that weight loss is not guaranteed but only seen in the context of appropriate use, regular follow up, exercise and making appropriate food choices.     I personally discussed the potential complications of the laparoscopic gastric sleeve with this patient.  The patient is well aware of potential complications of the surgery that include but not limited to bleeding, infections, deep vein thrombosis, pulmonary embolism, pulmonary complications such as pneumonia, cardiac event, hernias, small bowel  obstruction, damage to the spleen or other organs, bowel injury, disfiguring scars, failure to lose weight, need for additional surgery, conversion to an open procedure and death.  The patient is also aware of complications which apply in particular to the gastric sleeve and can include but not limited to the leakage of gastric contents at the staple line, the development of an intra-abdominal abscess, gastroesophageal reflux disease, Miller's esophagus, ulcers, vitamin/mineral deficiencies, strictures, and the possibility of converting this procedure to a Ty-en-Y gastric bypass. The patient also understands the possibility of requiring an acid reducer medication for the rest of their life.    The risks, benefits, potential complications and alternative therapies were discussed at great length as outlined in our extensive consent forms, online consent and educational teaching processes.    The patient has confirmed the participation in the programs extensive educational activities.    All questions and concerns were answered to patient's satisfaction.  The patient now wishes to proceed with surgery.     The patient has agreed to a postoperative course of anitcoagulant therapy.      I instructed patient that the surgery could be laparoscopically and/or robotically.    I instructed patient to start on a H2 blocker or proton pump inhibitor if not already on one of these medications.    I explained in detail the procedures that are in the consent.  All of these procedures have a chance to convert to open if any technical challenges or complications do occur.  Bariatric surgery is not cosmetic surgery but rather a tool to help a patient make a life-long commitment lifestyle change including diet, exercise, behavior changes, and taking supplemental vitamins and minerals.    Problems after surgery may require more operations to correct them.    The risks, benefits, alternatives, and potential complications of all of the  procedures were explained in detail including, but not limited to death, anesthesia and medication adverse effect, deep venous thrombosis, pulmonary embolism, trocar site/incisional hernia, wound infection, abdominal infection, bleeding, failure to lose weight, gain weight, a change in body image, metabolic complications with vitamin deficiences and anemia.    Weight loss expectations were discussed with the patient in detail. The weight loss operations most commonly performed are the sleeve gastrectomy and the Ty-en-Y gastric bypass. These operations result in weight losses up to approximately 25-35% of initial body weight 12 to 24 months after surgery with the gastric bypass usually the higher percent of weight loss but depends on patient using the tool.    For the gastric bypass and loop duodenal switch (MARGRET-S) the risks include but not limited to the following early complications:  Anastomotic leak/peritonitis, Ty/Alimentary/biliopancreatic limb obstruction, severe & minor wound infection/seroma, and nausea/vomiting.  Late complications can include but are not limited to malnutrition, vitamin deficiencies, frequent loose stools,  stomal stenosis, marginal ulcer, bowel obstruction, intussusception, internal, and incisional hernia.    Regarding the gastric sleeve, there is higher risk of dysphagia and reflux leading to possible Miller's esophagus compared to a gastric bypass, as well as risk of internal visceral/organ injury, splenectomy, bleeding, infection, leak (which could require further intervention possible conversion to Ty-en-Y gastric bypass), stenosis and possibility of regaining weight.    Purvi was counseled regarding diagnostic results, instructions for management, risk factor reductions, prognosis, patient and family education, impressions, risks and benefits of treatment options and importance of compliance with treatment. Total time of the encounter was over 45 minutes counseling the  patient regarding the procedure as above and reviewing as well as ordering labs, medications and the procedure.  The chart was also reviewed prior to seeing the patient reviewing previous testing, studies and labs.    Purvi understands the surgical procedures and the different surgical options that are available.  She understands the lifestyle changes that are required after surgery and has agreed to follow the guidelines outlined in the weight management program.  She also expressed understanding of the risks involved and had all of female questions answered and desires to proceed.      Josue Moore MD  2/22/2024

## 2024-02-26 ENCOUNTER — PREP FOR SURGERY (OUTPATIENT)
Dept: OTHER | Facility: HOSPITAL | Age: 29
End: 2024-02-26
Payer: COMMERCIAL

## 2024-02-26 DIAGNOSIS — E66.01 OBESITY, CLASS III, BMI 40-49.9 (MORBID OBESITY): Primary | ICD-10-CM

## 2024-02-26 DIAGNOSIS — K44.9 HIATAL HERNIA: ICD-10-CM

## 2024-02-26 RX ORDER — SODIUM CHLORIDE 0.9 % (FLUSH) 0.9 %
3 SYRINGE (ML) INJECTION EVERY 12 HOURS SCHEDULED
Status: CANCELLED | OUTPATIENT
Start: 2024-02-26

## 2024-02-26 RX ORDER — CHLORHEXIDINE GLUCONATE ORAL RINSE 1.2 MG/ML
15 SOLUTION DENTAL SEE ADMIN INSTRUCTIONS
Status: CANCELLED | OUTPATIENT
Start: 2024-03-04

## 2024-02-26 RX ORDER — PROMETHAZINE HYDROCHLORIDE 25 MG/1
25 SUPPOSITORY RECTAL ONCE
Status: CANCELLED | OUTPATIENT
Start: 2024-03-04

## 2024-02-26 RX ORDER — PROMETHAZINE HYDROCHLORIDE 12.5 MG/1
12.5 TABLET ORAL ONCE
Status: CANCELLED | OUTPATIENT
Start: 2024-03-04 | End: 2024-02-26

## 2024-02-26 RX ORDER — SCOLOPAMINE TRANSDERMAL SYSTEM 1 MG/1
1 PATCH, EXTENDED RELEASE TRANSDERMAL CONTINUOUS
Status: CANCELLED | OUTPATIENT
Start: 2024-03-04 | End: 2024-03-07

## 2024-02-26 RX ORDER — PANTOPRAZOLE SODIUM 40 MG/10ML
40 INJECTION, POWDER, LYOPHILIZED, FOR SOLUTION INTRAVENOUS ONCE
Status: CANCELLED | OUTPATIENT
Start: 2024-03-04 | End: 2024-02-26

## 2024-02-26 RX ORDER — SODIUM CHLORIDE 0.9 % (FLUSH) 0.9 %
3-10 SYRINGE (ML) INJECTION AS NEEDED
Status: CANCELLED | OUTPATIENT
Start: 2024-03-04

## 2024-02-26 RX ORDER — CEFAZOLIN SODIUM IN 0.9 % NACL 3 G/100 ML
3 INTRAVENOUS SOLUTION, PIGGYBACK (ML) INTRAVENOUS ONCE
Status: CANCELLED | OUTPATIENT
Start: 2024-03-04 | End: 2024-02-26

## 2024-02-26 RX ORDER — METOCLOPRAMIDE HYDROCHLORIDE 5 MG/ML
10 INJECTION INTRAMUSCULAR; INTRAVENOUS ONCE
Status: CANCELLED | OUTPATIENT
Start: 2024-03-04 | End: 2024-02-26

## 2024-02-26 RX ORDER — SODIUM CHLORIDE, SODIUM LACTATE, POTASSIUM CHLORIDE, CALCIUM CHLORIDE 600; 310; 30; 20 MG/100ML; MG/100ML; MG/100ML; MG/100ML
100 INJECTION, SOLUTION INTRAVENOUS CONTINUOUS
Status: CANCELLED | OUTPATIENT
Start: 2024-03-04

## 2024-02-26 RX ORDER — GABAPENTIN 300 MG/1
300 CAPSULE ORAL ONCE
Status: CANCELLED | OUTPATIENT
Start: 2024-03-04 | End: 2024-02-26

## 2024-02-26 RX ORDER — SODIUM CHLORIDE 9 MG/ML
40 INJECTION, SOLUTION INTRAVENOUS AS NEEDED
Status: CANCELLED | OUTPATIENT
Start: 2024-03-04

## 2024-02-29 ENCOUNTER — PRE-ADMISSION TESTING (OUTPATIENT)
Dept: PREADMISSION TESTING | Facility: HOSPITAL | Age: 29
End: 2024-02-29
Payer: COMMERCIAL

## 2024-02-29 VITALS
RESPIRATION RATE: 16 BRPM | BODY MASS INDEX: 44.77 KG/M2 | HEIGHT: 70 IN | HEART RATE: 100 BPM | TEMPERATURE: 97.4 F | OXYGEN SATURATION: 98 % | SYSTOLIC BLOOD PRESSURE: 141 MMHG | DIASTOLIC BLOOD PRESSURE: 83 MMHG

## 2024-02-29 DIAGNOSIS — K44.9 HIATAL HERNIA: ICD-10-CM

## 2024-02-29 DIAGNOSIS — E66.01 OBESITY, CLASS III, BMI 40-49.9 (MORBID OBESITY): ICD-10-CM

## 2024-02-29 LAB
ALBUMIN SERPL-MCNC: 4.1 G/DL (ref 3.5–5.2)
ALBUMIN/GLOB SERPL: 1.5 G/DL
ALP SERPL-CCNC: 84 U/L (ref 39–117)
ALT SERPL W P-5'-P-CCNC: 7 U/L (ref 1–33)
ANION GAP SERPL CALCULATED.3IONS-SCNC: 8 MMOL/L (ref 5–15)
AST SERPL-CCNC: 10 U/L (ref 1–32)
BILIRUB SERPL-MCNC: 0.4 MG/DL (ref 0–1.2)
BUN SERPL-MCNC: 15 MG/DL (ref 6–20)
BUN/CREAT SERPL: 14.7 (ref 7–25)
CALCIUM SPEC-SCNC: 9.2 MG/DL (ref 8.6–10.5)
CHLORIDE SERPL-SCNC: 105 MMOL/L (ref 98–107)
CO2 SERPL-SCNC: 27 MMOL/L (ref 22–29)
CREAT SERPL-MCNC: 1.02 MG/DL (ref 0.57–1)
DEPRECATED RDW RBC AUTO: 40.4 FL (ref 37–54)
EGFRCR SERPLBLD CKD-EPI 2021: 76.1 ML/MIN/1.73
ERYTHROCYTE [DISTWIDTH] IN BLOOD BY AUTOMATED COUNT: 13.2 % (ref 12.3–15.4)
GLOBULIN UR ELPH-MCNC: 2.8 GM/DL
GLUCOSE SERPL-MCNC: 97 MG/DL (ref 65–99)
HCG SERPL QL: NEGATIVE
HCT VFR BLD AUTO: 36.6 % (ref 34–46.6)
HGB BLD-MCNC: 11.9 G/DL (ref 12–15.9)
MCH RBC QN AUTO: 27.2 PG (ref 26.6–33)
MCHC RBC AUTO-ENTMCNC: 32.5 G/DL (ref 31.5–35.7)
MCV RBC AUTO: 83.6 FL (ref 79–97)
PLATELET # BLD AUTO: 226 10*3/MM3 (ref 140–450)
PMV BLD AUTO: 10.3 FL (ref 6–12)
POTASSIUM SERPL-SCNC: 4.2 MMOL/L (ref 3.5–5.2)
PROT SERPL-MCNC: 6.9 G/DL (ref 6–8.5)
RBC # BLD AUTO: 4.38 10*6/MM3 (ref 3.77–5.28)
SODIUM SERPL-SCNC: 140 MMOL/L (ref 136–145)
WBC NRBC COR # BLD AUTO: 5.18 10*3/MM3 (ref 3.4–10.8)

## 2024-02-29 PROCEDURE — 85027 COMPLETE CBC AUTOMATED: CPT

## 2024-02-29 PROCEDURE — 80053 COMPREHEN METABOLIC PANEL: CPT

## 2024-02-29 PROCEDURE — 36415 COLL VENOUS BLD VENIPUNCTURE: CPT

## 2024-02-29 PROCEDURE — 84703 CHORIONIC GONADOTROPIN ASSAY: CPT

## 2024-02-29 NOTE — DISCHARGE INSTRUCTIONS
Take only the following medications the morning of surgery:     LEVOTHYROXINE    ARRIVE AT 0600.    Do not take Bariatric Vitamins, Folic Acid, Actigall (if applicable) or Lovenox Injections (if applicable) the morning of surgery.  If you have a history of blood clots or have a BMI greater than 50, Dr. Moore may order Lovenox for after surgery. Do not take Lovenox blood thinner before surgery.      General Instructions:    Liquids only the day before surgery.  Drink one 20 ounce Gatorade G2 the evening before surgery.  Nothing red in color.   The morning of surgery have another 20 ounce Gatorade G2.  Again, nothing red in color.  Your drink must be completed 2 hours before your arrival time.   Patients who avoid smoking, chewing tobacco and alcohol for 4 weeks prior to surgery have a reduced risk of post-operative complications.  Quit smoking as many days before surgery as you can.  Do not smoke, use chewing tobacco or drink alcohol the day of surgery.   Bring any papers given to you in the doctor's office.  Wear clean comfortable clothes.  Do not wear contact lenses, false eyelashes or make-up.  Bring a case for your glasses.   Bring crutches or walker if applicable.  Remove all piercings.  Leave jewelry and any other valuables at home.  Remove fingernail polish, gel overlays or any artificial nails.  Hair extensions with metal clips must be removed prior to surgery.  The Pre-Admission Testing nurse will instruct you to bring medications if unable to obtain an accurate list in Pre-Admission Testing.    If you were given a blood bank ID arm band remember to bring it with you the day of surgery.    Preventing a Surgical Site Infection:  For 2 to 3 days before surgery, avoid shaving with a razor because the razor can irritate skin and make it easier to develop an infection.    Any areas of open skin can increase the risk of a post-operative wound infection by allowing bacteria to enter and travel throughout the body.   Notify your surgeon if you have any skin wounds / rashes even if it is not near the expected surgical site.  The area will need assessed to determine if surgery should be delayed until it is healed.  2 days prior to surgery, take a shower using a fresh bar of anti-bacterial soap (such as Dial).  Use a clean washcloth and dry with a clean towel.    The day prior to surgery, take a shower using a fresh bar of anti-bacterial soap (such as Dial).  Use a clean washcloth and dry with a clean towel.  Sleep in a clean bed with clean clothing.  Do not allow pets to sleep with you.  The morning of surgery shower using a fresh bar of anti-bacterial soap (such as Dial).  Use a clean washcloth and dry with a clean towel.  Follow the Chlorhexidine instructions below.    CHLORHEXIDINE CLOTH INSTRUCTIONS  The morning of surgery follow these instructions using the Chlorhexidine cloths you've been given.  These steps reduce bacteria on the body.  Do not use the cloths near your eyes, ears mouth, genitalia or on open wounds.  Throw the cloths away after use but do not try to flush them down a toilet.    Open and remove one cloth at a time from the package.    Leave the cloth unfolded and begin the bathing.  Massage the skin with the cloths using gentle pressure to remove bacteria.  Do not scrub harshly.   Follow the steps below with one 2% CHG cloth per area (6 total cloths).  One cloth for neck, shoulders and chest.  One cloth for both arms, hands, fingers and underarms (do underarms last).  One cloth for the abdomen followed by groin.  One cloth for right leg and foot including between the toes.  One cloth for left leg and foot including between the toes.  The last cloth is to be used for the back of the neck, back and buttocks.    Allow the CHG to air dry 3 minutes on the skin which will give it time to work and decrease the chance of irritation.  The skin may feel sticky until it is dry.  Do not rinse with water or any other  liquid or you will lose the beneficial effects of the CHG.  If mild skin irritation occurs, do rinse the skin to remove the CHG.  Report this to the nurse at time of admission.  Do not apply lotions, creams, ointments, deodorants or perfumes after using the clothes. Dress in clean clothes before coming to the hospital.    Ask your surgeon if you will be receiving antibiotics prior to surgery.  Make sure you, your family, and all healthcare providers clean their hands with soap and water or an alcohol based hand  before caring for you or your wound.      Day of surgery:  Your arrival time is approximately two hours before your scheduled surgery time.  Upon arrival, a Pre-op nurse and Anesthesiologist will review your health history, obtain vital signs, and answer questions you may have.  A Pre-op nurse will start an IV and you may receive medication in preparation for surgery, including something to help you relax.  If applicable, we do ask that you have your C-PAP/BI-PAP machine available. It can be utilized the night of surgery.     Please be aware that surgery does come with discomfort.  We want to make every effort to control your discomfort so please discuss any uncontrolled symptoms with your nurse.   Your doctor will most likely have prescribed pain medications.      If you are going home after surgery you will receive individualized written care instructions before being discharged.  A responsible adult must drive you to and from the hospital on the day of your surgery and ideally stay with you through the night.   Discharge prescriptions can be filled by the hospital pharmacy during regular pharmacy hours.  If you are having surgery late in the day/evening your prescription may be e-prescribed to your pharmacy.  Please verify your pharmacy hours or chose a 24 hour pharmacy to avoid not having access to your prescription because your pharmacy has closed for the day.    If you are staying overnight  following surgery, you will be transported to your hospital room following the recovery period.  [facility] has all private rooms.    If you have any questions please call Pre-Admission Testing at [phone].  Deductibles and co-payments are collected on the day of service. Please be prepared to pay the required co-pay, deductible or deposit on the day of service as defined by your plan.    Call your surgeon immediately if you experience any of the following symptoms:  Sore Throat  Shortness of Breath or difficulty breathing  Cough  Chills  Body soreness or muscle pain  Headache  Fever  New loss of taste or smell  Do not arrive for your surgery ill.  Your procedure will need to be rescheduled to another time.  You will need to call your physician before the day of surgery to avoid any unnecessary exposure to hospital staff as well as other patients.

## 2024-03-04 ENCOUNTER — ANESTHESIA (OUTPATIENT)
Dept: PERIOP | Facility: HOSPITAL | Age: 29
DRG: 621 | End: 2024-03-04
Payer: COMMERCIAL

## 2024-03-04 ENCOUNTER — ANESTHESIA EVENT (OUTPATIENT)
Dept: PERIOP | Facility: HOSPITAL | Age: 29
DRG: 621 | End: 2024-03-04
Payer: COMMERCIAL

## 2024-03-04 ENCOUNTER — HOSPITAL ENCOUNTER (INPATIENT)
Facility: HOSPITAL | Age: 29
LOS: 1 days | Discharge: HOME | DRG: 621 | End: 2024-03-05
Attending: SURGERY | Admitting: SURGERY
Payer: COMMERCIAL

## 2024-03-04 DIAGNOSIS — K44.9 HIATAL HERNIA: ICD-10-CM

## 2024-03-04 DIAGNOSIS — E66.01 OBESITY, CLASS III, BMI 40-49.9 (MORBID OBESITY): ICD-10-CM

## 2024-03-04 PROCEDURE — 25010000002 MIDAZOLAM PER 1 MG: Performed by: ANESTHESIOLOGY

## 2024-03-04 PROCEDURE — 25010000002 PROPOFOL 10 MG/ML EMULSION: Performed by: NURSE ANESTHETIST, CERTIFIED REGISTERED

## 2024-03-04 PROCEDURE — 25010000002 MAGNESIUM SULFATE PER 500 MG OF MAGNESIUM: Performed by: NURSE ANESTHETIST, CERTIFIED REGISTERED

## 2024-03-04 PROCEDURE — 25010000002 ACETAMINOPHEN 10 MG/ML SOLUTION: Performed by: NURSE ANESTHETIST, CERTIFIED REGISTERED

## 2024-03-04 PROCEDURE — 25010000002 HYDROMORPHONE PER 4 MG: Performed by: SURGERY

## 2024-03-04 PROCEDURE — 25010000002 PROPOFOL 200 MG/20ML EMULSION: Performed by: NURSE ANESTHETIST, CERTIFIED REGISTERED

## 2024-03-04 PROCEDURE — 25010000002 CLONIDINE PER 1 MG: Performed by: SURGERY

## 2024-03-04 PROCEDURE — 25810000003 LACTATED RINGERS SOLUTION: Performed by: SURGERY

## 2024-03-04 PROCEDURE — 25010000002 DEXAMETHASONE SODIUM PHOSPHATE 20 MG/5ML SOLUTION: Performed by: NURSE ANESTHETIST, CERTIFIED REGISTERED

## 2024-03-04 PROCEDURE — 25010000002 THIAMINE PER 100 MG: Performed by: SURGERY

## 2024-03-04 PROCEDURE — 25810000003 LACTATED RINGERS PER 1000 ML: Performed by: SURGERY

## 2024-03-04 PROCEDURE — 88307 TISSUE EXAM BY PATHOLOGIST: CPT | Performed by: SURGERY

## 2024-03-04 PROCEDURE — 81025 URINE PREGNANCY TEST: CPT | Performed by: ANESTHESIOLOGY

## 2024-03-04 PROCEDURE — 0BQT4ZZ REPAIR DIAPHRAGM, PERCUTANEOUS ENDOSCOPIC APPROACH: ICD-10-PCS | Performed by: SURGERY

## 2024-03-04 PROCEDURE — 8E0W4CZ ROBOTIC ASSISTED PROCEDURE OF TRUNK REGION, PERCUTANEOUS ENDOSCOPIC APPROACH: ICD-10-PCS | Performed by: SURGERY

## 2024-03-04 PROCEDURE — 25810000003 LACTATED RINGERS PER 1000 ML: Performed by: NURSE ANESTHETIST, CERTIFIED REGISTERED

## 2024-03-04 PROCEDURE — 43775 LAP SLEEVE GASTRECTOMY: CPT | Performed by: NURSE PRACTITIONER

## 2024-03-04 PROCEDURE — 25010000002 ONDANSETRON PER 1 MG: Performed by: NURSE ANESTHETIST, CERTIFIED REGISTERED

## 2024-03-04 PROCEDURE — 25010000002 ONDANSETRON PER 1 MG: Performed by: SURGERY

## 2024-03-04 PROCEDURE — 25010000002 SUGAMMADEX 200 MG/2ML SOLUTION: Performed by: NURSE ANESTHETIST, CERTIFIED REGISTERED

## 2024-03-04 PROCEDURE — 25010000002 HYDROMORPHONE PER 4 MG: Performed by: NURSE ANESTHETIST, CERTIFIED REGISTERED

## 2024-03-04 PROCEDURE — 25010000002 ROPIVACAINE PER 1 MG: Performed by: SURGERY

## 2024-03-04 PROCEDURE — 25010000002 FENTANYL CITRATE (PF) 50 MCG/ML SOLUTION: Performed by: NURSE ANESTHETIST, CERTIFIED REGISTERED

## 2024-03-04 PROCEDURE — 25810000003 SODIUM CHLORIDE 0.9 % SOLUTION 1,000 ML FLEX CONT: Performed by: SURGERY

## 2024-03-04 PROCEDURE — 43775 LAP SLEEVE GASTRECTOMY: CPT | Performed by: SURGERY

## 2024-03-04 PROCEDURE — 25010000002 EPINEPHRINE 1 MG/ML SOLUTION 30 ML VIAL: Performed by: SURGERY

## 2024-03-04 PROCEDURE — 25010000002 VANCOMYCIN 10 G RECONSTITUTED SOLUTION: Performed by: SURGERY

## 2024-03-04 PROCEDURE — 25010000002 KETOROLAC TROMETHAMINE PER 15 MG: Performed by: SURGERY

## 2024-03-04 PROCEDURE — 25010000002 METOCLOPRAMIDE PER 10 MG: Performed by: SURGERY

## 2024-03-04 PROCEDURE — 25810000003 SODIUM CHLORIDE 0.9 % SOLUTION: Performed by: SURGERY

## 2024-03-04 PROCEDURE — 0DB64Z3 EXCISION OF STOMACH, PERCUTANEOUS ENDOSCOPIC APPROACH, VERTICAL: ICD-10-PCS | Performed by: SURGERY

## 2024-03-04 DEVICE — PBT NON ABSORBABLE WOUND CLOSURE DEVICE
Type: IMPLANTABLE DEVICE | Site: STOMACH | Status: FUNCTIONAL
Brand: V-LOC

## 2024-03-04 DEVICE — IMPLANTABLE DEVICE
Type: IMPLANTABLE DEVICE | Site: STOMACH | Status: FUNCTIONAL
Brand: TITAN SGS STANDARD GASTRIC STAPLER

## 2024-03-04 RX ORDER — ONDANSETRON 2 MG/ML
INJECTION INTRAMUSCULAR; INTRAVENOUS AS NEEDED
Status: DISCONTINUED | OUTPATIENT
Start: 2024-03-04 | End: 2024-03-04 | Stop reason: SURG

## 2024-03-04 RX ORDER — IPRATROPIUM BROMIDE AND ALBUTEROL SULFATE 2.5; .5 MG/3ML; MG/3ML
3 SOLUTION RESPIRATORY (INHALATION) ONCE AS NEEDED
Status: DISCONTINUED | OUTPATIENT
Start: 2024-03-04 | End: 2024-03-04 | Stop reason: HOSPADM

## 2024-03-04 RX ORDER — METOCLOPRAMIDE HYDROCHLORIDE 5 MG/ML
10 INJECTION INTRAMUSCULAR; INTRAVENOUS EVERY 6 HOURS
Status: DISCONTINUED | OUTPATIENT
Start: 2024-03-04 | End: 2024-03-05 | Stop reason: HOSPADM

## 2024-03-04 RX ORDER — MIDAZOLAM HYDROCHLORIDE 1 MG/ML
1 INJECTION INTRAMUSCULAR; INTRAVENOUS
Status: DISCONTINUED | OUTPATIENT
Start: 2024-03-04 | End: 2024-03-04

## 2024-03-04 RX ORDER — LIDOCAINE HYDROCHLORIDE 10 MG/ML
0.5 INJECTION, SOLUTION INFILTRATION; PERINEURAL ONCE AS NEEDED
Status: DISCONTINUED | OUTPATIENT
Start: 2024-03-04 | End: 2024-03-04 | Stop reason: HOSPADM

## 2024-03-04 RX ORDER — CEFAZOLIN SODIUM IN 0.9 % NACL 3 G/100 ML
3 INTRAVENOUS SOLUTION, PIGGYBACK (ML) INTRAVENOUS ONCE
Status: DISCONTINUED | OUTPATIENT
Start: 2024-03-04 | End: 2024-03-04

## 2024-03-04 RX ORDER — ONDANSETRON 4 MG/1
4 TABLET, ORALLY DISINTEGRATING ORAL EVERY 4 HOURS PRN
Status: DISCONTINUED | OUTPATIENT
Start: 2024-03-04 | End: 2024-03-05 | Stop reason: HOSPADM

## 2024-03-04 RX ORDER — FENTANYL CITRATE 50 UG/ML
INJECTION, SOLUTION INTRAMUSCULAR; INTRAVENOUS AS NEEDED
Status: DISCONTINUED | OUTPATIENT
Start: 2024-03-04 | End: 2024-03-04 | Stop reason: SURG

## 2024-03-04 RX ORDER — MIRTAZAPINE 15 MG/1
15 TABLET, ORALLY DISINTEGRATING ORAL NIGHTLY PRN
Status: DISCONTINUED | OUTPATIENT
Start: 2024-03-04 | End: 2024-03-05 | Stop reason: HOSPADM

## 2024-03-04 RX ORDER — SCOLOPAMINE TRANSDERMAL SYSTEM 1 MG/1
1 PATCH, EXTENDED RELEASE TRANSDERMAL CONTINUOUS
Status: DISCONTINUED | OUTPATIENT
Start: 2024-03-04 | End: 2024-03-05 | Stop reason: HOSPADM

## 2024-03-04 RX ORDER — ROCURONIUM BROMIDE 10 MG/ML
INJECTION, SOLUTION INTRAVENOUS AS NEEDED
Status: DISCONTINUED | OUTPATIENT
Start: 2024-03-04 | End: 2024-03-04 | Stop reason: SURG

## 2024-03-04 RX ORDER — LABETALOL HYDROCHLORIDE 5 MG/ML
5 INJECTION, SOLUTION INTRAVENOUS
Status: DISCONTINUED | OUTPATIENT
Start: 2024-03-04 | End: 2024-03-04 | Stop reason: HOSPADM

## 2024-03-04 RX ORDER — PROMETHAZINE HYDROCHLORIDE 25 MG/1
25 SUPPOSITORY RECTAL ONCE AS NEEDED
Status: DISCONTINUED | OUTPATIENT
Start: 2024-03-04 | End: 2024-03-04 | Stop reason: HOSPADM

## 2024-03-04 RX ORDER — SODIUM CHLORIDE, SODIUM LACTATE, POTASSIUM CHLORIDE, CALCIUM CHLORIDE 600; 310; 30; 20 MG/100ML; MG/100ML; MG/100ML; MG/100ML
150 INJECTION, SOLUTION INTRAVENOUS CONTINUOUS
Status: DISCONTINUED | OUTPATIENT
Start: 2024-03-04 | End: 2024-03-05 | Stop reason: HOSPADM

## 2024-03-04 RX ORDER — CHLORHEXIDINE GLUCONATE ORAL RINSE 1.2 MG/ML
15 SOLUTION DENTAL SEE ADMIN INSTRUCTIONS
Status: COMPLETED | OUTPATIENT
Start: 2024-03-04 | End: 2024-03-04

## 2024-03-04 RX ORDER — PROPOFOL 10 MG/ML
INJECTION, EMULSION INTRAVENOUS AS NEEDED
Status: DISCONTINUED | OUTPATIENT
Start: 2024-03-04 | End: 2024-03-04 | Stop reason: SURG

## 2024-03-04 RX ORDER — PANTOPRAZOLE SODIUM 40 MG/10ML
40 INJECTION, POWDER, LYOPHILIZED, FOR SOLUTION INTRAVENOUS ONCE
Status: COMPLETED | OUTPATIENT
Start: 2024-03-04 | End: 2024-03-04

## 2024-03-04 RX ORDER — LEVOTHYROXINE SODIUM 0.03 MG/1
25 TABLET ORAL
Status: DISCONTINUED | OUTPATIENT
Start: 2024-03-05 | End: 2024-03-05 | Stop reason: HOSPADM

## 2024-03-04 RX ORDER — ENOXAPARIN SODIUM 100 MG/ML
40 INJECTION SUBCUTANEOUS ONCE
Status: COMPLETED | OUTPATIENT
Start: 2024-03-05 | End: 2024-03-05

## 2024-03-04 RX ORDER — CYANOCOBALAMIN 1000 UG/ML
1000 INJECTION, SOLUTION INTRAMUSCULAR; SUBCUTANEOUS ONCE
Status: COMPLETED | OUTPATIENT
Start: 2024-03-05 | End: 2024-03-05

## 2024-03-04 RX ORDER — SODIUM CHLORIDE, SODIUM LACTATE, POTASSIUM CHLORIDE, CALCIUM CHLORIDE 600; 310; 30; 20 MG/100ML; MG/100ML; MG/100ML; MG/100ML
9 INJECTION, SOLUTION INTRAVENOUS CONTINUOUS
Status: DISCONTINUED | OUTPATIENT
Start: 2024-03-04 | End: 2024-03-05 | Stop reason: HOSPADM

## 2024-03-04 RX ORDER — KETAMINE HCL IN NACL, ISO-OSM 100MG/10ML
SYRINGE (ML) INJECTION AS NEEDED
Status: DISCONTINUED | OUTPATIENT
Start: 2024-03-04 | End: 2024-03-04 | Stop reason: SURG

## 2024-03-04 RX ORDER — NALOXONE HCL 0.4 MG/ML
0.1 VIAL (ML) INJECTION
Status: DISCONTINUED | OUTPATIENT
Start: 2024-03-04 | End: 2024-03-05 | Stop reason: HOSPADM

## 2024-03-04 RX ORDER — MAGNESIUM HYDROXIDE 1200 MG/15ML
LIQUID ORAL AS NEEDED
Status: DISCONTINUED | OUTPATIENT
Start: 2024-03-04 | End: 2024-03-04 | Stop reason: HOSPADM

## 2024-03-04 RX ORDER — DIPHENHYDRAMINE HYDROCHLORIDE 50 MG/ML
12.5 INJECTION INTRAMUSCULAR; INTRAVENOUS
Status: DISCONTINUED | OUTPATIENT
Start: 2024-03-04 | End: 2024-03-04 | Stop reason: HOSPADM

## 2024-03-04 RX ORDER — DIPHENHYDRAMINE HYDROCHLORIDE 50 MG/ML
25 INJECTION INTRAMUSCULAR; INTRAVENOUS EVERY 4 HOURS PRN
Status: DISCONTINUED | OUTPATIENT
Start: 2024-03-04 | End: 2024-03-05 | Stop reason: HOSPADM

## 2024-03-04 RX ORDER — FLUMAZENIL 0.1 MG/ML
0.2 INJECTION INTRAVENOUS AS NEEDED
Status: DISCONTINUED | OUTPATIENT
Start: 2024-03-04 | End: 2024-03-04 | Stop reason: HOSPADM

## 2024-03-04 RX ORDER — FAMOTIDINE 10 MG/ML
20 INJECTION, SOLUTION INTRAVENOUS EVERY 12 HOURS SCHEDULED
Status: DISCONTINUED | OUTPATIENT
Start: 2024-03-04 | End: 2024-03-05 | Stop reason: HOSPADM

## 2024-03-04 RX ORDER — LORAZEPAM 1 MG/1
1 TABLET ORAL EVERY 12 HOURS PRN
Status: DISCONTINUED | OUTPATIENT
Start: 2024-03-04 | End: 2024-03-05 | Stop reason: HOSPADM

## 2024-03-04 RX ORDER — MAGNESIUM SULFATE HEPTAHYDRATE 500 MG/ML
INJECTION, SOLUTION INTRAMUSCULAR; INTRAVENOUS AS NEEDED
Status: DISCONTINUED | OUTPATIENT
Start: 2024-03-04 | End: 2024-03-04 | Stop reason: SURG

## 2024-03-04 RX ORDER — MIDAZOLAM HYDROCHLORIDE 1 MG/ML
2 INJECTION INTRAMUSCULAR; INTRAVENOUS
Status: DISCONTINUED | OUTPATIENT
Start: 2024-03-04 | End: 2024-03-04 | Stop reason: HOSPADM

## 2024-03-04 RX ORDER — VANCOMYCIN 2 GRAM/500 ML IN 0.9 % SODIUM CHLORIDE INTRAVENOUS
2000 ONCE
Status: COMPLETED | OUTPATIENT
Start: 2024-03-04 | End: 2024-03-04

## 2024-03-04 RX ORDER — PROMETHAZINE HYDROCHLORIDE 12.5 MG/1
12.5 SUPPOSITORY RECTAL EVERY 4 HOURS PRN
Status: DISCONTINUED | OUTPATIENT
Start: 2024-03-04 | End: 2024-03-05 | Stop reason: HOSPADM

## 2024-03-04 RX ORDER — TRANEXAMIC ACID 100 MG/ML
INJECTION, SOLUTION INTRAVENOUS AS NEEDED
Status: DISCONTINUED | OUTPATIENT
Start: 2024-03-04 | End: 2024-03-04 | Stop reason: SURG

## 2024-03-04 RX ORDER — HYDROMORPHONE HYDROCHLORIDE 1 MG/ML
0.5 INJECTION, SOLUTION INTRAMUSCULAR; INTRAVENOUS; SUBCUTANEOUS
Status: DISCONTINUED | OUTPATIENT
Start: 2024-03-04 | End: 2024-03-05 | Stop reason: HOSPADM

## 2024-03-04 RX ORDER — PROCHLORPERAZINE EDISYLATE 5 MG/ML
10 INJECTION INTRAMUSCULAR; INTRAVENOUS EVERY 6 HOURS PRN
Status: DISCONTINUED | OUTPATIENT
Start: 2024-03-04 | End: 2024-03-05 | Stop reason: HOSPADM

## 2024-03-04 RX ORDER — PROMETHAZINE HYDROCHLORIDE 25 MG/1
12.5 TABLET ORAL ONCE
Status: DISCONTINUED | OUTPATIENT
Start: 2024-03-04 | End: 2024-03-04 | Stop reason: HOSPADM

## 2024-03-04 RX ORDER — ALBUTEROL SULFATE 2.5 MG/3ML
2.5 SOLUTION RESPIRATORY (INHALATION) EVERY 4 HOURS PRN
Status: DISCONTINUED | OUTPATIENT
Start: 2024-03-04 | End: 2024-03-05 | Stop reason: HOSPADM

## 2024-03-04 RX ORDER — HYDRALAZINE HYDROCHLORIDE 20 MG/ML
10 INJECTION INTRAMUSCULAR; INTRAVENOUS
Status: DISCONTINUED | OUTPATIENT
Start: 2024-03-04 | End: 2024-03-05 | Stop reason: HOSPADM

## 2024-03-04 RX ORDER — METOCLOPRAMIDE HYDROCHLORIDE 5 MG/ML
10 INJECTION INTRAMUSCULAR; INTRAVENOUS ONCE
Status: COMPLETED | OUTPATIENT
Start: 2024-03-04 | End: 2024-03-04

## 2024-03-04 RX ORDER — SODIUM CHLORIDE, SODIUM LACTATE, POTASSIUM CHLORIDE, CALCIUM CHLORIDE 600; 310; 30; 20 MG/100ML; MG/100ML; MG/100ML; MG/100ML
100 INJECTION, SOLUTION INTRAVENOUS CONTINUOUS
Status: DISCONTINUED | OUTPATIENT
Start: 2024-03-04 | End: 2024-03-05 | Stop reason: HOSPADM

## 2024-03-04 RX ORDER — GABAPENTIN 300 MG/1
300 CAPSULE ORAL ONCE
Status: COMPLETED | OUTPATIENT
Start: 2024-03-04 | End: 2024-03-04

## 2024-03-04 RX ORDER — DROPERIDOL 2.5 MG/ML
0.62 INJECTION, SOLUTION INTRAMUSCULAR; INTRAVENOUS
Status: DISCONTINUED | OUTPATIENT
Start: 2024-03-04 | End: 2024-03-04 | Stop reason: HOSPADM

## 2024-03-04 RX ORDER — HYDROMORPHONE HYDROCHLORIDE 1 MG/ML
0.5 INJECTION, SOLUTION INTRAMUSCULAR; INTRAVENOUS; SUBCUTANEOUS
Status: DISCONTINUED | OUTPATIENT
Start: 2024-03-04 | End: 2024-03-04 | Stop reason: HOSPADM

## 2024-03-04 RX ORDER — HYDROMORPHONE HYDROCHLORIDE 2 MG/1
2 TABLET ORAL EVERY 4 HOURS PRN
Status: DISCONTINUED | OUTPATIENT
Start: 2024-03-04 | End: 2024-03-05 | Stop reason: HOSPADM

## 2024-03-04 RX ORDER — PROMETHAZINE HYDROCHLORIDE 25 MG/1
25 TABLET ORAL ONCE AS NEEDED
Status: DISCONTINUED | OUTPATIENT
Start: 2024-03-04 | End: 2024-03-04 | Stop reason: HOSPADM

## 2024-03-04 RX ORDER — HYDRALAZINE HYDROCHLORIDE 20 MG/ML
5 INJECTION INTRAMUSCULAR; INTRAVENOUS
Status: DISCONTINUED | OUTPATIENT
Start: 2024-03-04 | End: 2024-03-04 | Stop reason: HOSPADM

## 2024-03-04 RX ORDER — SODIUM CHLORIDE 0.9 % (FLUSH) 0.9 %
3-10 SYRINGE (ML) INJECTION AS NEEDED
Status: DISCONTINUED | OUTPATIENT
Start: 2024-03-04 | End: 2024-03-04 | Stop reason: HOSPADM

## 2024-03-04 RX ORDER — ONDANSETRON 2 MG/ML
4 INJECTION INTRAMUSCULAR; INTRAVENOUS ONCE AS NEEDED
Status: DISCONTINUED | OUTPATIENT
Start: 2024-03-04 | End: 2024-03-04 | Stop reason: HOSPADM

## 2024-03-04 RX ORDER — ACETAMINOPHEN 10 MG/ML
INJECTION, SOLUTION INTRAVENOUS AS NEEDED
Status: DISCONTINUED | OUTPATIENT
Start: 2024-03-04 | End: 2024-03-04 | Stop reason: SURG

## 2024-03-04 RX ORDER — DEXAMETHASONE SODIUM PHOSPHATE 4 MG/ML
INJECTION, SOLUTION INTRA-ARTICULAR; INTRALESIONAL; INTRAMUSCULAR; INTRAVENOUS; SOFT TISSUE AS NEEDED
Status: DISCONTINUED | OUTPATIENT
Start: 2024-03-04 | End: 2024-03-04 | Stop reason: SURG

## 2024-03-04 RX ORDER — SODIUM CHLORIDE 0.9 % (FLUSH) 0.9 %
3 SYRINGE (ML) INJECTION EVERY 12 HOURS SCHEDULED
Status: DISCONTINUED | OUTPATIENT
Start: 2024-03-04 | End: 2024-03-04 | Stop reason: HOSPADM

## 2024-03-04 RX ORDER — EPHEDRINE SULFATE 50 MG/ML
5 INJECTION, SOLUTION INTRAVENOUS ONCE AS NEEDED
Status: DISCONTINUED | OUTPATIENT
Start: 2024-03-04 | End: 2024-03-04 | Stop reason: HOSPADM

## 2024-03-04 RX ORDER — SODIUM CHLORIDE, SODIUM LACTATE, POTASSIUM CHLORIDE, CALCIUM CHLORIDE 600; 310; 30; 20 MG/100ML; MG/100ML; MG/100ML; MG/100ML
INJECTION, SOLUTION INTRAVENOUS CONTINUOUS PRN
Status: DISCONTINUED | OUTPATIENT
Start: 2024-03-04 | End: 2024-03-04 | Stop reason: SURG

## 2024-03-04 RX ORDER — SODIUM CHLORIDE 9 MG/ML
40 INJECTION, SOLUTION INTRAVENOUS AS NEEDED
Status: DISCONTINUED | OUTPATIENT
Start: 2024-03-04 | End: 2024-03-04 | Stop reason: HOSPADM

## 2024-03-04 RX ORDER — ONDANSETRON 2 MG/ML
4 INJECTION INTRAMUSCULAR; INTRAVENOUS EVERY 4 HOURS PRN
Status: DISCONTINUED | OUTPATIENT
Start: 2024-03-04 | End: 2024-03-05 | Stop reason: HOSPADM

## 2024-03-04 RX ORDER — ACETAMINOPHEN 160 MG/5ML
975 SOLUTION ORAL EVERY 6 HOURS
Status: DISCONTINUED | OUTPATIENT
Start: 2024-03-04 | End: 2024-03-05 | Stop reason: HOSPADM

## 2024-03-04 RX ORDER — HYDROCODONE BITARTRATE AND ACETAMINOPHEN 5; 325 MG/1; MG/1
1 TABLET ORAL ONCE AS NEEDED
Status: DISCONTINUED | OUTPATIENT
Start: 2024-03-04 | End: 2024-03-04 | Stop reason: HOSPADM

## 2024-03-04 RX ORDER — TRAMADOL HYDROCHLORIDE 50 MG/1
50 TABLET ORAL EVERY 4 HOURS PRN
Status: DISCONTINUED | OUTPATIENT
Start: 2024-03-04 | End: 2024-03-05 | Stop reason: HOSPADM

## 2024-03-04 RX ORDER — ACETAMINOPHEN 500 MG
1000 TABLET ORAL EVERY 6 HOURS
Status: DISCONTINUED | OUTPATIENT
Start: 2024-03-04 | End: 2024-03-05 | Stop reason: HOSPADM

## 2024-03-04 RX ORDER — NALOXONE HCL 0.4 MG/ML
0.2 VIAL (ML) INJECTION AS NEEDED
Status: DISCONTINUED | OUTPATIENT
Start: 2024-03-04 | End: 2024-03-04 | Stop reason: HOSPADM

## 2024-03-04 RX ORDER — OXYCODONE AND ACETAMINOPHEN 7.5; 325 MG/1; MG/1
1 TABLET ORAL EVERY 4 HOURS PRN
Status: DISCONTINUED | OUTPATIENT
Start: 2024-03-04 | End: 2024-03-04 | Stop reason: HOSPADM

## 2024-03-04 RX ORDER — PROMETHAZINE HYDROCHLORIDE 25 MG/1
25 SUPPOSITORY RECTAL ONCE
Status: DISCONTINUED | OUTPATIENT
Start: 2024-03-04 | End: 2024-03-04 | Stop reason: HOSPADM

## 2024-03-04 RX ORDER — VANCOMYCIN 2 GRAM/500 ML IN 0.9 % SODIUM CHLORIDE INTRAVENOUS
2000 ONCE
Status: DISCONTINUED | OUTPATIENT
Start: 2024-03-04 | End: 2024-03-04

## 2024-03-04 RX ORDER — PROMETHAZINE HYDROCHLORIDE 12.5 MG/1
12.5 TABLET ORAL EVERY 4 HOURS PRN
Status: DISCONTINUED | OUTPATIENT
Start: 2024-03-04 | End: 2024-03-05 | Stop reason: HOSPADM

## 2024-03-04 RX ORDER — LIDOCAINE HYDROCHLORIDE 20 MG/ML
INJECTION, SOLUTION EPIDURAL; INFILTRATION; INTRACAUDAL; PERINEURAL AS NEEDED
Status: DISCONTINUED | OUTPATIENT
Start: 2024-03-04 | End: 2024-03-04 | Stop reason: SURG

## 2024-03-04 RX ORDER — SODIUM CHLORIDE 0.9 % (FLUSH) 0.9 %
3 SYRINGE (ML) INJECTION EVERY 12 HOURS SCHEDULED
Status: DISCONTINUED | OUTPATIENT
Start: 2024-03-04 | End: 2024-03-05 | Stop reason: HOSPADM

## 2024-03-04 RX ORDER — FENTANYL CITRATE 50 UG/ML
50 INJECTION, SOLUTION INTRAMUSCULAR; INTRAVENOUS
Status: DISCONTINUED | OUTPATIENT
Start: 2024-03-04 | End: 2024-03-04 | Stop reason: HOSPADM

## 2024-03-04 RX ADMIN — Medication 20 MG: at 08:32

## 2024-03-04 RX ADMIN — ACETAMINOPHEN 1000 MG: 10 INJECTION, SOLUTION INTRAVENOUS at 08:50

## 2024-03-04 RX ADMIN — ACETAMINOPHEN 975 MG: 160 SOLUTION ORAL at 12:23

## 2024-03-04 RX ADMIN — ACETAMINOPHEN 1000 MG: 500 TABLET ORAL at 18:00

## 2024-03-04 RX ADMIN — FENTANYL CITRATE 25 MCG: 50 INJECTION, SOLUTION INTRAMUSCULAR; INTRAVENOUS at 08:04

## 2024-03-04 RX ADMIN — SODIUM CHLORIDE, POTASSIUM CHLORIDE, SODIUM LACTATE AND CALCIUM CHLORIDE 500 ML: 600; 310; 30; 20 INJECTION, SOLUTION INTRAVENOUS at 07:26

## 2024-03-04 RX ADMIN — HYOSCYAMINE SULFATE 125 MCG: 0.12 TABLET ORAL; SUBLINGUAL at 12:23

## 2024-03-04 RX ADMIN — PROPOFOL 150 MG: 10 INJECTION, EMULSION INTRAVENOUS at 08:04

## 2024-03-04 RX ADMIN — ONDANSETRON 4 MG: 2 INJECTION INTRAMUSCULAR; INTRAVENOUS at 09:01

## 2024-03-04 RX ADMIN — ROCURONIUM BROMIDE 30 MG: 10 INJECTION, SOLUTION INTRAVENOUS at 08:31

## 2024-03-04 RX ADMIN — 0.12% CHLORHEXIDINE GLUCONATE 15 ML: 1.2 RINSE ORAL at 07:12

## 2024-03-04 RX ADMIN — FAMOTIDINE 20 MG: 10 INJECTION INTRAVENOUS at 12:23

## 2024-03-04 RX ADMIN — Medication 3 ML: at 20:26

## 2024-03-04 RX ADMIN — SODIUM CHLORIDE, POTASSIUM CHLORIDE, SODIUM LACTATE AND CALCIUM CHLORIDE 500 ML: 600; 310; 30; 20 INJECTION, SOLUTION INTRAVENOUS at 07:30

## 2024-03-04 RX ADMIN — GABAPENTIN 300 MG: 300 CAPSULE ORAL at 07:12

## 2024-03-04 RX ADMIN — VANCOMYCIN HYDROCHLORIDE 2000 MG: 10 INJECTION, POWDER, LYOPHILIZED, FOR SOLUTION INTRAVENOUS at 07:35

## 2024-03-04 RX ADMIN — FAMOTIDINE 20 MG: 10 INJECTION INTRAVENOUS at 20:26

## 2024-03-04 RX ADMIN — FENTANYL CITRATE 25 MCG: 50 INJECTION, SOLUTION INTRAMUSCULAR; INTRAVENOUS at 08:15

## 2024-03-04 RX ADMIN — ROCURONIUM BROMIDE 50 MG: 10 INJECTION, SOLUTION INTRAVENOUS at 08:04

## 2024-03-04 RX ADMIN — SODIUM CHLORIDE, POTASSIUM CHLORIDE, SODIUM LACTATE AND CALCIUM CHLORIDE 150 ML/HR: 600; 310; 30; 20 INJECTION, SOLUTION INTRAVENOUS at 23:35

## 2024-03-04 RX ADMIN — PANTOPRAZOLE SODIUM 40 MG: 40 INJECTION, POWDER, FOR SOLUTION INTRAVENOUS at 07:25

## 2024-03-04 RX ADMIN — PROPOFOL 150 MCG/KG/MIN: 10 INJECTION, EMULSION INTRAVENOUS at 08:04

## 2024-03-04 RX ADMIN — Medication 30 MG: at 08:15

## 2024-03-04 RX ADMIN — Medication 3 ML: at 12:28

## 2024-03-04 RX ADMIN — FOLIC ACID 250 ML/HR: 5 INJECTION, SOLUTION INTRAMUSCULAR; INTRAVENOUS; SUBCUTANEOUS at 23:30

## 2024-03-04 RX ADMIN — ONDANSETRON 4 MG: 2 INJECTION INTRAMUSCULAR; INTRAVENOUS at 12:32

## 2024-03-04 RX ADMIN — SUGAMMADEX 400 MG: 100 INJECTION, SOLUTION INTRAVENOUS at 09:06

## 2024-03-04 RX ADMIN — METOCLOPRAMIDE 10 MG: 5 INJECTION, SOLUTION INTRAMUSCULAR; INTRAVENOUS at 07:26

## 2024-03-04 RX ADMIN — SODIUM CHLORIDE, POTASSIUM CHLORIDE, SODIUM LACTATE AND CALCIUM CHLORIDE 150 ML/HR: 600; 310; 30; 20 INJECTION, SOLUTION INTRAVENOUS at 18:01

## 2024-03-04 RX ADMIN — HYDROMORPHONE HYDROCHLORIDE 0.5 MG: 1 INJECTION, SOLUTION INTRAMUSCULAR; INTRAVENOUS; SUBCUTANEOUS at 09:40

## 2024-03-04 RX ADMIN — METOCLOPRAMIDE 10 MG: 5 INJECTION, SOLUTION INTRAMUSCULAR; INTRAVENOUS at 23:30

## 2024-03-04 RX ADMIN — METOCLOPRAMIDE 10 MG: 5 INJECTION, SOLUTION INTRAMUSCULAR; INTRAVENOUS at 18:00

## 2024-03-04 RX ADMIN — HYDROMORPHONE HYDROCHLORIDE 2 MG: 2 TABLET ORAL at 16:40

## 2024-03-04 RX ADMIN — DEXAMETHASONE SODIUM PHOSPHATE 10 MG: 4 INJECTION, SOLUTION INTRAMUSCULAR; INTRAVENOUS at 08:04

## 2024-03-04 RX ADMIN — HYDROMORPHONE HYDROCHLORIDE 0.5 MG: 1 INJECTION, SOLUTION INTRAMUSCULAR; INTRAVENOUS; SUBCUTANEOUS at 12:23

## 2024-03-04 RX ADMIN — LIDOCAINE HYDROCHLORIDE 100 MG: 20 INJECTION, SOLUTION EPIDURAL; INFILTRATION; INTRACAUDAL; PERINEURAL at 08:04

## 2024-03-04 RX ADMIN — TRANEXAMIC ACID 1000 MG: 100 INJECTION INTRAVENOUS at 08:58

## 2024-03-04 RX ADMIN — SCOPALAMINE 1 PATCH: 1 PATCH, EXTENDED RELEASE TRANSDERMAL at 07:11

## 2024-03-04 RX ADMIN — ACETAMINOPHEN 1000 MG: 500 TABLET ORAL at 23:30

## 2024-03-04 RX ADMIN — MIDAZOLAM 1 MG: 1 INJECTION INTRAMUSCULAR; INTRAVENOUS at 07:26

## 2024-03-04 RX ADMIN — ROCURONIUM BROMIDE 20 MG: 10 INJECTION, SOLUTION INTRAVENOUS at 08:15

## 2024-03-04 RX ADMIN — METOCLOPRAMIDE 10 MG: 5 INJECTION, SOLUTION INTRAMUSCULAR; INTRAVENOUS at 12:32

## 2024-03-04 RX ADMIN — MAGNESIUM SULFATE HEPTAHYDRATE 2 G: 500 INJECTION, SOLUTION INTRAMUSCULAR; INTRAVENOUS at 08:10

## 2024-03-04 RX ADMIN — SODIUM CHLORIDE, POTASSIUM CHLORIDE, SODIUM LACTATE AND CALCIUM CHLORIDE: 600; 310; 30; 20 INJECTION, SOLUTION INTRAVENOUS at 07:55

## 2024-03-04 NOTE — PLAN OF CARE
Goal Outcome Evaluation:  Plan of Care Reviewed With: patient, spouse        Progress: improving  Outcome Evaluation: VSS.  abd lap sites CDI with surgical glue.  room air.  IS to 1500.  IVF's infusing.  IV dilaudid x1, IV zofran x1, PO dilaudid x1.  voiding.  ambulating in [place].  abd binder in place.

## 2024-03-04 NOTE — ANESTHESIA POSTPROCEDURE EVALUATION
Patient: Purvi Foreman    Procedure Summary       Date: 03/04/24 Room / Location:  [facility]  [facility]    Anesthesia Start: 0755 Anesthesia Stop: 0925    Procedure: Sleeve gastrectomy LAPAROSCOPIC WITH DAVINCI ROBOT With Hiatal Hernia Repair (Abdomen) Diagnosis:       Obesity, Class III, BMI 40-49.9 (morbid obesity)      Hiatal hernia      (Obesity, Class III, BMI 40-49.9 (morbid obesity) [E66.01])      (Hiatal hernia [K44.9])    Surgeons: Josue Moore Jr., MD Provider: Richard Da Silva MD    Anesthesia Type: general ASA Status: 3            Anesthesia Type: general    Vitals  Vitals Value Taken Time   /72 03/04/24 1000   Temp 36.4 °C (97.5 °F) 03/04/24 0920   Pulse 67 03/04/24 1002   Resp 18 03/04/24 0945   SpO2 97 % 03/04/24 1002   Vitals shown include unfiled device data.        Post Anesthesia Care and Evaluation    Patient location during evaluation: bedside  Patient participation: complete - patient participated  Level of consciousness: awake and alert  Pain management: adequate    Airway patency: patent  Anesthetic complications: No anesthetic complications  PONV Status: controlled  Cardiovascular status: blood pressure returned to baseline and acceptable  Respiratory status: acceptable  Hydration status: acceptable

## 2024-03-04 NOTE — SIGNIFICANT NOTE
"   03/04/24 0729   Peripheral IV 03/04/24 0728 Anterior;Right Forearm   Placement date: If unknown, DO NOT use \"Add Comment\" note/Placement time: If unknown, DO NOT use \"Add Comment\" note: 03/04/24 0728   Hand Hygiene Completed: Yes  Size (Gauge): (c) 20 G  Orientation: Anterior;Right  Location: Forearm  Site Prep: Chlorh...   Site Assessment Clean;Dry;Intact   Dressing Type Transparent   Line Status Blood return noted;Infusing;Flushed   Dressing Status Clean;Dry;Intact   Dressing Intervention New dressing     Ultrasound Inserted IV Site:rfa    Catheter Length:1.88in    Diameter:0.34cm    Depth:1.0cm      Vascular Access Score=5  1) Palpable / Visible / Dis  2) Palpable / Viasible / Not Distended  3) Easily Palpable / Not Visibile  4) Poorly Palpable / Visible  5) Poorly / Nonpalpable / NV   "

## 2024-03-04 NOTE — ANESTHESIA PROCEDURE NOTES
Airway  Urgency: elective    Date/Time: 3/4/2024 8:08 AM  Airway not difficult    General Information and Staff    Patient location during procedure: OR  CRNA/CAA: Debby Mascorro CRNA    Indications and Patient Condition  Indications for airway management: airway protection    Preoxygenated: yes  Mask difficulty assessment: 1 - vent by mask    Final Airway Details  Final airway type: endotracheal airway      Successful airway: ETT  Cuffed: yes   Successful intubation technique: direct laryngoscopy  Facilitating devices/methods: intubating stylet  Endotracheal tube insertion site: oral  Blade: Melissa  Blade size: 3  ETT size (mm): 7.0  Cormack-Lehane Classification: grade I - full view of glottis  Placement verified by: chest auscultation and capnometry   Cuff volume (mL): 8  Measured from: lips  Number of attempts at approach: 1  Assessment: lips, teeth, and gum same as pre-op and atraumatic intubation

## 2024-03-04 NOTE — ANESTHESIA PREPROCEDURE EVALUATION
Anesthesia Evaluation     Patient summary reviewed and Nursing notes reviewed   NPO Solid Status: > 6 hours  NPO Liquid Status: > 2 hours           Airway   Mallampati: III  TM distance: >3 FB  Neck ROM: full  Anterior  Dental - normal exam     Pulmonary    (-) COPD, asthma, not a smoker  Cardiovascular   Exercise tolerance: good (4-7 METS)    (-) past MI, dysrhythmias, angina      Neuro/Psych  (+) psychiatric history Anxiety and Depression  (-) seizures, CVA  GI/Hepatic/Renal/Endo    (+) morbid obesity, hiatal hernia, thyroid problem hypothyroidism  (-) GERD, liver disease, no renal disease, diabetes    Musculoskeletal     Abdominal    Substance History      OB/GYN          Other                    Anesthesia Plan    ASA 3     general       Anesthetic plan, risks, benefits, and alternatives have been provided, discussed and informed consent has been obtained with: patient.    CODE STATUS:

## 2024-03-04 NOTE — OP NOTE
PREOPERATIVE DIAGNOSIS:  Morbid obesity with multiple comorbidities as referenced in the most recent history and physical.    POSTOPERATIVE DIAGNOSIS:  Morbid obesity with multiple comorbidities as referenced in the most recent history and physical. Sliding Hiatal hernia    PROCEDURES PERFORMED:  1.  Robotic assisted laparoscopic sleeve gastrectomy with Titan Stapler # [id number]  2.  Robotic sliding hiatal hernia repair    SURGEON:  Josue Moore Jr., FACS, [place]    ASSISTANT: ISSA Nolasco    Txa was given. Entire staple oversewn with 2-0 vloc. Hemostasis was obtained.    Surgery assisted and facilitated by a certified physician assistant, who directly resulted in a decreased operative time, anesthetic time, wound exposure, and possibly of an operative wound infection, thereby decreasing patient morbidity and ultimately total expenditures.  The surgical assistant assisted in placement of trochars, take down of the gastrocolic omentum, short gastric vessels and dissection at the angle of His.  Also assisted in retraction of the stomach during stapling so as not to kink the gastric sleeve.  Also assisted in removing of the gastric specimen, closure of the fascial defect as well as closure of the skin incisions.    ANESTHESIA:  General endotracheal and laparoscopic TAP block with Ropivacaine mixture    ESTIMATED BLOOD LOSS:   Less than 25 mL unless dictated below.    FLUIDS:  Crystalloids.    SPECIMENS:  Gastric remnant    DRAINS:  None.    COUNTS:  Correct.    COMPLICATIONS:  None.    INDICATIONS:  This patient with morbid obesity and associated comorbidities presents for elective laparoscopic,robotic, possible open sleeve gastrectomy.  The patient has received medical clearance to proceed.  The patient has undergone our extensive educational process and consent process and wishes to proceed.    DESCRIPTION OF PROCEDURE:  The patient was brought to the operating room and placed supine upon the operating room  table. SCD hose were placed.  The patient underwent uneventful general endotracheal anesthesia per the anesthesiology staff. The abdomen was prepped with ChloraPrep and draped in the usual sterile fashion. Anesthesia staff passed a 38-Hebrew bougie into the stomach to decompress the stomach and then was pulled back to the esophagus.      An 8 mm transverse incision was made just to the left of midline.  The peritoneal cavity was entered under direct camera visualization using a 5  mm 0° laparoscope and an Optiview trocar.  The abdomen was then insufflated to a pressure of 8-12 mmHg with CO2 gas.with AirSeal.  Exploratory laparoscopy revealed no evidence of injury from the entrance technique and no significant abnormalities unless addendum dictated below.  An angled laparoscope was then used.  The patient was placed in reverse Trendelenburg position.  Under direct camera visualization two 8 mm trocar was placed in the left lateral midabdominal position.  A 12 mm trocar was placed in the right abdomen.  A Violeta retractor was placed through an epigastric incision and used to elevate the left lobe of the liver.      Next the 30 degree 8 mm scope was brought into the 8 mm port just to the left of the umbilicus after the corresponding trocar was docked to the da Foreign robot.  Targeting then took place and then the rest of the trochars were docked to the robot and angled into position.  Instruments were brought in through the trochars in place for laparoscopic view.       I then took control of the surgical console. At this point, approximately skilled nursing along the greater curvature, the gastrocolic omentum was divided with the Vessel Sealer and this proceeded superiorly to the angle of His taking down the short gastric vessels.  All posterior attachments of the lesser sac and posterior aspect of the stomach to the pancreas were taken down as well.      Dissection then proceeded medially taking down the greater curvature  with the vessel sealer to just proximal to the pylorus.  The 38-English bougie was passed back down into the stomach to aid in sizing the sleeve.       The right 12 mm intuitive trocar was removed and replaced with the 19 mm trocar.  The stomach was marked with indelible ink 1 cm from the esophagus, 3 cm at the incisura and approximately 4 to 5 cm from the pylorus.  The Titan stapler was advanced into the abdomen and placed into position and used to create the gastric sleeve.  The stapler was then removed after firing.    Insufflation of the stomach under water was performed and there was no evidence of leak.    Tisseel was then sprayed on the staple line.    The specimen was removed through the 19 mm port site.  The liver retractor was removed. The fascia at the 19 mm trocar site incision that was used for extraction was closed with a single 0 Vicryl suture in a figure-of-eight fashion placed under direct laparoscopic camera visualization with a suture passer and tying the knot extracorporeally.  The fascia in the area was infiltrated with local anesthesia. All incisions were then infiltrated with local anesthetic. The remaining trocars were removed under direct camera visualization with no bleeding noted from their sites.  The abdomen was desufflated of gas. The skin in each incision was closed using 4-0 antibiotic impregnated Monocryl in a subcuticular fashion followed by Dermabond.  The patient tolerated the procedure well without complication and was taken to the recovery room in stable condition.  All sponge, needle and instrument counts were correct.     The patient was noted to have a hiatal hernia.  The phrenoesophageal membrane was opened up with electrocautery and the right and left crura were cleaned off using sharp and blunt dissection.  The peritoneum overlying the right mirian was incised and the plane along the hernia sac was developed.  The dissection then extended anteriorly and laterally to the left  mirian.  The base of the crural confluence was dissected free of adhesions to the hernia sac.  The hernia sac was carefully dissected into the mediastinum with caudal traction.  The interfaces between the pleura, pericardium, spine, and aorta were developed as a dissection was carried cephalically to the top of the hernia sac.  Sac contents were completely reduced back into the abdominal cavity.  Careful attention was made not to injure the vagus nerve.  The esophagus was identified and dissected circumferentially and along its previous stomach course in order to reduce tension, allowing the gastroesophageal junction to rest comfortably within the abdominal cavity.  The gastrosplenic ligament and the short gastric vessels were divided with the Vessel sealer device.  The retroesophageal window was developed and the esophagus was retracted caudally.  The crural pillars were then approximated with a 0-silk suture.  Anterior reinforcement was performed as well if needed.

## 2024-03-05 ENCOUNTER — READMISSION MANAGEMENT (OUTPATIENT)
Dept: CALL CENTER | Facility: HOSPITAL | Age: 29
End: 2024-03-05
Payer: COMMERCIAL

## 2024-03-05 VITALS
RESPIRATION RATE: 16 BRPM | DIASTOLIC BLOOD PRESSURE: 73 MMHG | TEMPERATURE: 97.6 F | SYSTOLIC BLOOD PRESSURE: 110 MMHG | HEIGHT: 70 IN | OXYGEN SATURATION: 95 % | HEART RATE: 82 BPM | BODY MASS INDEX: 41.95 KG/M2 | WEIGHT: 293 LBS

## 2024-03-05 LAB
ALBUMIN SERPL-MCNC: 3.6 G/DL (ref 3.5–5.2)
ALBUMIN/GLOB SERPL: 1.6 G/DL
ALP SERPL-CCNC: 67 U/L (ref 39–117)
ALT SERPL W P-5'-P-CCNC: 22 U/L (ref 1–33)
ANION GAP SERPL CALCULATED.3IONS-SCNC: 7 MMOL/L (ref 5–15)
AST SERPL-CCNC: 22 U/L (ref 1–32)
BASOPHILS # BLD AUTO: 0.02 10*3/MM3 (ref 0–0.2)
BASOPHILS NFR BLD AUTO: 0.3 % (ref 0–1.5)
BILIRUB SERPL-MCNC: 0.3 MG/DL (ref 0–1.2)
BUN SERPL-MCNC: 12 MG/DL (ref 6–20)
BUN/CREAT SERPL: 12.9 (ref 7–25)
CALCIUM SPEC-SCNC: 8.2 MG/DL (ref 8.6–10.5)
CHLORIDE SERPL-SCNC: 109 MMOL/L (ref 98–107)
CO2 SERPL-SCNC: 22 MMOL/L (ref 22–29)
CREAT SERPL-MCNC: 0.93 MG/DL (ref 0.57–1)
DEPRECATED RDW RBC AUTO: 40 FL (ref 37–54)
EGFRCR SERPLBLD CKD-EPI 2021: 85 ML/MIN/1.73
EOSINOPHIL # BLD AUTO: 0.02 10*3/MM3 (ref 0–0.4)
EOSINOPHIL NFR BLD AUTO: 0.3 % (ref 0.3–6.2)
ERYTHROCYTE [DISTWIDTH] IN BLOOD BY AUTOMATED COUNT: 13.6 % (ref 12.3–15.4)
GLOBULIN UR ELPH-MCNC: 2.2 GM/DL
GLUCOSE SERPL-MCNC: 105 MG/DL (ref 65–99)
HCT VFR BLD AUTO: 32.4 % (ref 34–46.6)
HGB BLD-MCNC: 10.7 G/DL (ref 12–15.9)
IMM GRANULOCYTES # BLD AUTO: 0.03 10*3/MM3 (ref 0–0.05)
IMM GRANULOCYTES NFR BLD AUTO: 0.4 % (ref 0–0.5)
LYMPHOCYTES # BLD AUTO: 2.01 10*3/MM3 (ref 0.7–3.1)
LYMPHOCYTES NFR BLD AUTO: 25.8 % (ref 19.6–45.3)
MAGNESIUM SERPL-MCNC: 1.8 MG/DL (ref 1.6–2.6)
MCH RBC QN AUTO: 27.3 PG (ref 26.6–33)
MCHC RBC AUTO-ENTMCNC: 33 G/DL (ref 31.5–35.7)
MCV RBC AUTO: 82.7 FL (ref 79–97)
MONOCYTES # BLD AUTO: 0.5 10*3/MM3 (ref 0.1–0.9)
MONOCYTES NFR BLD AUTO: 6.4 % (ref 5–12)
NEUTROPHILS NFR BLD AUTO: 5.2 10*3/MM3 (ref 1.7–7)
NEUTROPHILS NFR BLD AUTO: 66.8 % (ref 42.7–76)
NRBC BLD AUTO-RTO: 0.3 /100 WBC (ref 0–0.2)
PHOSPHATE SERPL-MCNC: 2.2 MG/DL (ref 2.5–4.5)
PLATELET # BLD AUTO: 221 10*3/MM3 (ref 140–450)
PMV BLD AUTO: 10.8 FL (ref 6–12)
POTASSIUM SERPL-SCNC: 4.1 MMOL/L (ref 3.5–5.2)
PROT SERPL-MCNC: 5.8 G/DL (ref 6–8.5)
RBC # BLD AUTO: 3.92 10*6/MM3 (ref 3.77–5.28)
SODIUM SERPL-SCNC: 138 MMOL/L (ref 136–145)
WBC NRBC COR # BLD AUTO: 7.78 10*3/MM3 (ref 3.4–10.8)

## 2024-03-05 PROCEDURE — 80053 COMPREHEN METABOLIC PANEL: CPT | Performed by: SURGERY

## 2024-03-05 PROCEDURE — 25010000002 CYANOCOBALAMIN PER 1000 MCG: Performed by: SURGERY

## 2024-03-05 PROCEDURE — 84100 ASSAY OF PHOSPHORUS: CPT | Performed by: SURGERY

## 2024-03-05 PROCEDURE — 85025 COMPLETE CBC W/AUTO DIFF WBC: CPT | Performed by: SURGERY

## 2024-03-05 PROCEDURE — 83735 ASSAY OF MAGNESIUM: CPT | Performed by: SURGERY

## 2024-03-05 PROCEDURE — 25010000002 METOCLOPRAMIDE PER 10 MG: Performed by: SURGERY

## 2024-03-05 PROCEDURE — 25010000002 ENOXAPARIN PER 10 MG: Performed by: SURGERY

## 2024-03-05 RX ORDER — TRAMADOL HYDROCHLORIDE 50 MG/1
50 TABLET ORAL EVERY 6 HOURS PRN
Qty: 12 TABLET | Refills: 0 | Status: SHIPPED | OUTPATIENT
Start: 2024-03-05

## 2024-03-05 RX ORDER — ONDANSETRON 4 MG/1
4 TABLET, ORALLY DISINTEGRATING ORAL EVERY 8 HOURS PRN
Qty: 20 TABLET | Refills: 0 | Status: SHIPPED | OUTPATIENT
Start: 2024-03-05

## 2024-03-05 RX ADMIN — TRAMADOL HYDROCHLORIDE 50 MG: 50 TABLET ORAL at 08:23

## 2024-03-05 RX ADMIN — ENOXAPARIN SODIUM 40 MG: 100 INJECTION SUBCUTANEOUS at 10:47

## 2024-03-05 RX ADMIN — CYANOCOBALAMIN 1000 MCG: 1000 INJECTION, SOLUTION INTRAMUSCULAR; SUBCUTANEOUS at 08:23

## 2024-03-05 RX ADMIN — LEVOTHYROXINE SODIUM 25 MCG: 25 TABLET ORAL at 06:39

## 2024-03-05 RX ADMIN — FAMOTIDINE 20 MG: 10 INJECTION INTRAVENOUS at 08:23

## 2024-03-05 RX ADMIN — METOCLOPRAMIDE 10 MG: 5 INJECTION, SOLUTION INTRAMUSCULAR; INTRAVENOUS at 06:39

## 2024-03-05 RX ADMIN — ACETAMINOPHEN 1000 MG: 500 TABLET ORAL at 06:39

## 2024-03-05 RX ADMIN — Medication 2 PACKET: at 10:47

## 2024-03-05 NOTE — NURSING NOTE
Patient and  were given discharge instructions.  He gave pt her Lovenox injection with my instructions without difficulty.  She is going to take home the Phosphorus in water bottle to drink during the day.  5 protein packs were given at discharge.  She wanted to keep the Scopolamine patch on - she is aware to remove in 2 days and wash hands immediately after removal.  All questions answered.  Patient ready to go home.

## 2024-03-05 NOTE — OUTREACH NOTE
Prep Survey      Flowsheet Row Responses   [facility] facility patient discharged from? [place]   Is LACE score < 7 ? Yes   Eligibility [facility]   Date of Admission 03/04/24   Date of Discharge 03/05/24   Discharge Disposition Home or Self Care   Discharge diagnosis Sleeve gastrectomy lap   Does the patient have one of the following disease processes/diagnoses(primary or secondary)? General Surgery   Does the patient have Home health ordered? No   Is there a DME ordered? No   Prep survey completed? Yes            LORRIE BRUNSON - Registered Nurse

## 2024-03-05 NOTE — PROGRESS NOTES
Case Management Discharge Note      Final Note: Discharged home. Viki Blackwell RN         Selected Continued Care - Discharged on 3/5/2024 Admission date: 3/4/2024 - Discharge disposition: Home or Self Care       Transportation Services  Private: Car    Final Discharge Disposition Code: 01 - home or self-care

## 2024-03-05 NOTE — PLAN OF CARE
Goal Outcome Evaluation:  Plan of Care Reviewed With: patient        Progress: improving  Outcome Evaluation: vss, scheduke tylenol for pain, on sips of water, voiding freely, 5 lap site with glue and on abdominal binder, ambulated in the hallway, encourage to use incentive spirometer, continue to monitor the pt.

## 2024-03-05 NOTE — DISCHARGE INSTRUCTIONS
GOING HOME AFTER GASTRIC SLEEVE/ GASTRIC BYPASS SURGERY  [facility] Weight Loss: Post-Operative Information/Instructions  Josue Moore Jr., MD  General Patient Instructions for Discharge   - Call Surgeon's office at [phone] for follow-up appointment.    - Be sure you, the patient, have a follow-up appointment to be seen within seven (7) days after discharge. If not, please call [phone] to schedule an appointment. If you are discharged on a Saturday or Sunday, please call Monday to schedule the appointment.  - Contact the Surgeon at [phone] for any questions or concerns, including temperature greater than or equal to 101F, shortness of breath, leg swelling, redness at incision sites, nausea, vomiting, chills, or problems or questions.    - Follow the Gastric Stage 1 Diet    à Clear liquids, room temperature, sugar-free, caffeine-free, non-carbonated, 70 grams of protein, No Straws.  - You may shower. No tub bath for 2 weeks.  - No lifting, pushing, pulling, or tugging >25 pounds for 3 weeks.  - Ambulate every 3 hours while awake minimum for seven (7) days, increase distance daily.  - For the next several weeks, you are at an increased risk for blood clot formation. Therefore, you should walk regularly. You should not sit for prolonged periods of time, more than 45 minutes, without getting up and walking for 5-10 minutes. This includes any car rides, including the drive home from the hospital. If driving any distance greater than 30 miles over the next two (2) weeks, stop every 30-45 minutes and walk for 5-10 minutes each time.  - Continue using Incentive Spirometer and coughing exercises at least every two (2) hours while awake for one week.  - Continue use of CPAP/BIPAP for diagnosis of sleep apnea as directed.  - No driving or operating machinery allowed while taking narcotic (prescription) pain medication, and until you feel comfortable forcefully applying the brakes if needed. (This  usually takes more than 3 days.)    - Make an appointment with your Primary Care Physician within one week post-op to look at your home medications for possible changes or discontinuity.   Medications  - The nurse will provide a list of medications for you to continue at home   - If you received a Lovenox (Enoxaparin) or Apixiban (Eliquis) prescription at pre-op visit with Surgeon, start taking the medicine the morning after discharge unless directed otherwise.    - If you were prescribed Lovenox (Enoxaparin), review the education/teaching material/video with the nurse.    - Take post op pain meds as prescribed as needed.   - Continue Foltx until finished.   - Resume use of Actigall (Ursodiol) one (1) week after surgery if patient still has gallbladder. You should have been given a prescription at your pre-op visit. Contact the office if you do not have the prescription.   - Resume bariatric vitamin regimen as instructed in pre-op education with bariatric coordinator.    - Zegerid or Prilosec OTC (or generic) by mouth once daily for four (4) weeks unless you are already taking a proton pump inhibitor as home medication. Follow dosing instructions on package.   Nausea/Vomiting:  The following are possible causes for nausea/vomiting:  - Drinking too much or too fast.  - Sinus drainage/post nasal drip for allergy sufferers (you may take Sudafed, Claritin, Tylenol Sinus/Allergy, or other decongestants and nose sprays to help with this discomfort).  - Low blood sugar (sweating, shaky, irritable, weakness, dizzy or tunnel-vision) - treatment is to sip 100% fruit juice - no sugar added until symptoms subside.  - Acid in fruit juice - (may dilute with water or avoid).  - Eating or drinking something that is not on clear liquid (stage 1) diet.  Any nausea/vomiting that prohibits you from keeping fluids down for greater than 24 hours requires a call to the surgeon's office.  Urine:  Use your urine color as a guide to  determine if you are drinking enough fluid. The darker the urine, the more fluids you need to drink. Urine should be clear to light yellow if you are getting enough fluid. If you should experience frequency, burning or pain with urination, blood in urine, contact us or your primary care physician for possible UTI (urinary tract infection), which could require antibiotics (liquid preferred).  Bowel Movements:  You may not have a bowel movement for 2-5 days after going home. You may then experience liquid, runny or loose stools for approximately 3-4 weeks following surgery. This would require you to drink even more fluids to prevent dehydration. Some patients may experience constipation, which can be treated with increased fluids, drinking warm liquids, increased activity and the use of a Fleets Enema, Milk of Magnesia, or suppositories. The first couple of bowel movements could be bloody, tarry black or dark maroon in color. This is OK as long as the stool returns to a normal color in 1-2 days. If however, you have frequent or a large amount of bloody or tarry black stools and/or become light-headed or dizzy, you may be bleeding and require urgent attention. Please call us right away.  Abdominal Incisions:  You will have small incisions. Do not scrub incisions, but allow the warm, soapy water to run over the incisions, rinse well, and pat dry. You may use any brand of anti-bacterial soap. Do not use Peroxide or Neosporin type ointments on sites, unless instructed to do so by a surgeon or nurse. Monitor daily for signs/symptoms of infection, which might include: drainage with a foul odor, pain, redness, swelling or heat at the incision sight; fever, body aches and chills. If you suspect infection or have a fever, give us a call.  Pain:  You will be given a prescription for pain medication to control your pain. If you feel the dose is too strong, you may take half the ordered dose, or you may take Tylenol adult liquid  per package instructions for minor pain. Do not take any medications that contain aspirin or aspirin products.  Do not take medications like: Motrin, Aleve, Ibuprofen, Advil, Naproxen, Celebrex, Daypro, Bextra, Meloxicam or other medications commonly used for arthritis or joint pain.  No steroids or cortisone injections. There may be pain, which should improve every few days. Pain should not suddenly get worse or more intense. Pain that suddenly changes and is constant and severe should be called in to the surgeon's office. Any sudden pain in the lower extremities with associated warmth and redness should be called in to the surgeon's office immediately. Do not rub or massage this area, as it could be a blood clot.  Diet:  Remain on the clear liquid diet (stage 1) per your  which includes 70 grams of protein each day, sugar free, non carbonated and no straws. Day 1 is the day of surgery. If you are tolerating the stage 1 diet, you may then proceed to stage 2 diet, as instructed in the . Do not progress to the stage 2 diet if you are having nausea/vomiting. Refer to the Basic Nutrition and Food Principles guide.  Medications:  The nurse will let you know which medications you will need to continue once you go home. Do not take any medications that are extended or time released if you had the gastric bypass procedure, OK to take if you had the gastric sleeve procedure. Large capsules can be opened and diluted with clear liquids. Check with your physician or pharmacist as to which pills may be crushed and which capsules may be opened and diluted safely. Continue taking Foltx as surgeon orders. If you still have your gall bladder and were prescribed Actigall (Ursodiol), you may resume this medication one week after your surgery. You will remain on Actigall (Ursodiol) for approximately 6 months. The dose is 1 pill, 2 times each day for 6 months.  Activity:  Continue your deep breathing and  coughing exercises with your Incentive Spirometer breathing device at least every 3 hours while awake (10 repetitions each time) for one week. May use CPAP. This will help to prevent respiratory problems such as pneumonia. No lifting, pulling or tugging anything over 25 pounds for 3 weeks after surgery. You may shower but no tub baths, hot tubs or swimming for 2 weeks. Moderate walking is recommended every 3 hours while awake minimum, increase distance daily. Further exercise will be discussed at the first post-op visit. No driving or operating machinery allow until off narcotic pain medication and until you feel comfortable forcefully applying the brakes (usually takes 3 or more days). For the next few weeks you are at an increased risk for blood clot formation. Therefore you should walk regularly and you should not sit for prolonged periods of time, more than 45 minutes without getting up and walking for 5-10 minutes. This includes car rides. Including riding home from the hospital. If riding a distance greater than 30 miles over the next 2 weeks stop every 30-45 minutes and walk 5-10 mintues each time. No tanning bed use for 8 weeks after surgery and in general, not recommended due to the increased risk for skin cancer. Incisions will burn/blister very badly with tanning bed use.  Illness:  Your primary care physician should treat general illness such as ear infections, sinus infections, and viral type illnesses, etc. Medications prescribed should be liquid/elixir form when possible, for the first 30 days.  General:  In general, it is recommended that you weigh yourself no more than once per week. Let the weight come off you and concentrate on more important things. Remember the weight was not gained overnight, nor will it be lost overnight. Gastric Bypass/ Gastric Sleeve weight loss will continue over a period of 12-18 months. Do not  yourself according to how others are doing after surgery, as this will  cause unnecessary discouragement.  THE ABOVE ARE GENERAL GUIDELINES TO ASSIST YOU ONCE HOME, IF YOU ARE IN DOUBT, OR YOU HAVE ANY QUESTIONS, CALL US AT THE NUMBERS LISTED BELOW.  IN THE EVENT OF SUDDEN CHEST PAIN, SHORTNESS OF BREATH, OR ANY LIFE THREATENING CONDITION, CALL 911.  Any time you are evaluated or admitted to another facility, please have someone notify the surgeon's office.  Supplements:  70 grams of protein taken EVERY DAY. Remember to drink at least 64 ounces of fluid a day, sipping slowly early on. Increase this amount during the summertime. Sipping slowly will not stretch your new stomach. Drinking too fast or gulping liquids will cause brief discomfort and early could cause staple line disruption (leak). With eating, tiny bites, then chew, chew, chew, and swallow. Lay your fork/spoon down for 2-3 minutes, and then take your next bite. Your pouch will tell you within 1-2 bites if it is going to tolerate what you are eating.   Protein Vendors:  Refer to protein vendors' handout from consult class. You can always find protein drinks at the bariatric office, grocery stores, [employer], drug [employer], [employer], health food stores, and on the Internet. Find one high in protein (15-30 grams per serving) and low carb (less than 18 grams per serving).  Now is a great time to re-read your . Please review specific instructions given to you at discharge by your physician (surgeon).  HOW/WHEN TO CONTACT US:  It is imperative that you contact us with any of the following:    Ÿ fever greater than 101 degrees  Ÿ shortness of breath  Ÿ leg swelling  Ÿ body aches  Ÿ shaking chills  Ÿ nausea and vomitting  Ÿ pain that has worsened  Ÿ redness at incision sites  Ÿ pus or foul smelling drainage from an incision or wound  Ÿ inability to keep fluids down for more than a day  Ÿ any other condition you feel needs our attention.  [facility] - Bariatric: [phone] call this number anytime 24 hours a  day / 7 days a week.  Teach-back Questions to be answered by the patient prior to discharge.   What complications would prompt you to call your doctor when you return home? _________________    What is the purpose of your prescribed medication? ________________  What are some potential side effects of the medications you will be taking at home? _______________

## 2024-03-05 NOTE — PROGRESS NOTES
Continued Stay Note  [facility]     Patient Name: Purvi Foreman  MRN: [id number]  Today's Date: 3/5/2024    Admit Date: 3/4/2024    Plan: Home no needs   Discharge Plan       Row Name 03/05/24 1045       Plan    Plan Home no needs    Plan Comments Discharge order noted. Met with patient who confirmed DC plan is to return home. Stated her spouse will assist as needed and will provide transportation at DC. Denies any needs/equipment.                   Discharge Codes    No documentation.                 Expected Discharge Date and Time       Expected Discharge Date Expected Discharge Time    Mar 5, 2024               Viki Blackwell RN

## 2024-03-05 NOTE — DISCHARGE SUMMARY
"Discharge Summary    Patient name: Purvi Foreman    Medical record number: [id number]    Admission date: 3/4/2024  Discharge date:      Attending physician: Dr. Josue Moore    Primary care physician: Kendrick Thompson Jr., MD    Referring physician: Josue Moore Jr., MD  [address] [address]    Condition on discharge: Stable    Primary Diagnoses:  Morbid obesity with co-morbidities    Operative Procedure:  robotic assisted laparoscopic sleeve gastrectomy with sliding hiatal hernia repair     Purvi Foreman  is post op day one status post procedure listed. Patient denies shortness of air and lower extremity pain. Feels better than yesterday. No vomiting this am. Ambulating well and using incentive spirometer.          /81 (BP Location: Left arm, Patient Position: Lying)   Pulse 69   Temp 98.3 °F (36.8 °C) (Oral)   Resp 16   Ht 177.8 cm (70\")   Wt (!) 140 kg (308 lb 3.3 oz)   SpO2 96%   BMI 44.22 kg/m²     General:  alert, appears stated age, and cooperative   Abdomen: soft, bowel sounds active, appropriate tenderness   Incision:   healing well, no drainage, no erythema, no hernia, no seroma, no swelling, no dehiscence, incision well approximated   Heart: Regular rate   Lungs: Clear to auscultation bilaterally     I reviewed the patient's new clinical results.     Lab Results (last 24 hours)       Procedure Component Value Units Date/Time    Comprehensive Metabolic Panel [[id number]]  (Abnormal) Collected: 03/05/24 0744    Specimen: Blood Updated: 03/05/24 0853     Glucose 105 mg/dL      BUN 12 mg/dL      Creatinine 0.93 mg/dL      Sodium 138 mmol/L      Potassium 4.1 mmol/L      Chloride 109 mmol/L      CO2 22.0 mmol/L      Calcium 8.2 mg/dL      Total Protein 5.8 g/dL      Albumin 3.6 g/dL      ALT (SGPT) 22 U/L      AST (SGOT) 22 U/L      Alkaline Phosphatase 67 U/L      Total Bilirubin 0.3 mg/dL      Globulin 2.2 gm/dL      A/G Ratio 1.6 g/dL      " BUN/Creatinine Ratio 12.9     Anion Gap 7.0 mmol/L      eGFR 85.0 mL/min/1.73     Narrative:      GFR Normal >60  Chronic Kidney Disease <60  Kidney Failure <15      Phosphorus [[id number]]  (Abnormal) Collected: 03/05/24 0744    Specimen: Blood Updated: 03/05/24 0853     Phosphorus 2.2 mg/dL     Magnesium [[id number]]  (Normal) Collected: 03/05/24 0744    Specimen: Blood Updated: 03/05/24 0853     Magnesium 1.8 mg/dL     CBC & Differential [[id number]]  (Abnormal) Collected: 03/05/24 0744    Specimen: Blood Updated: 03/05/24 0838    Narrative:      The following orders were created for panel order CBC & Differential.  Procedure                               Abnormality         Status                     ---------                               -----------         ------                     CBC Auto Differential[[id number]]        Abnormal            Final result                 Please view results for these tests on the individual orders.    CBC Auto Differential [[id number]]  (Abnormal) Collected: 03/05/24 0744    Specimen: Blood Updated: 03/05/24 0838     WBC 7.78 10*3/mm3      RBC 3.92 10*6/mm3      Hemoglobin 10.7 g/dL      Hematocrit 32.4 %      MCV 82.7 fL      MCH 27.3 pg      MCHC 33.0 g/dL      RDW 13.6 %      RDW-SD 40.0 fl      MPV 10.8 fL      Platelets 221 10*3/mm3      Neutrophil % 66.8 %      Lymphocyte % 25.8 %      Monocyte % 6.4 %      Eosinophil % 0.3 %      Basophil % 0.3 %      Immature Grans % 0.4 %      Neutrophils, Absolute 5.20 10*3/mm3      Lymphocytes, Absolute 2.01 10*3/mm3      Monocytes, Absolute 0.50 10*3/mm3      Eosinophils, Absolute 0.02 10*3/mm3      Basophils, Absolute 0.02 10*3/mm3      Immature Grans, Absolute 0.03 10*3/mm3      nRBC 0.3 /100 WBC     Tissue Pathology Exam [[id number]] Collected: 03/04/24 0828    Specimen: Tissue from Stomach Updated: 03/04/24 1034               Assessment:      Doing well postoperatively.        Plan:   1. Continue Stage 1 diet  2. Continue with  ambulation and Incentive spirometry  3. Plan for d/c home    Patient was seen and examined by Dr. Moore.    Hospital Course: The patient is a very pleasant 29 y.o. female that was admitted to the hospital with morbid obesity with comorbidities who underwent the above mentioned procedure without complication.  Postoperatively the patient was then transferred to the bariatric unit per protocol.  The patient was afebrile with vital signs stable.  They were ambulating well and using the incentive spirometer.  POD 1 the patient was started on bariatric diet which they tolerated without difficulty.  Patient was then discharged home to follow up as an outpatient.      Discharge medications:      Discharge Medications        New Medications        Instructions Start Date   ondansetron ODT 4 MG disintegrating tablet  Commonly known as: ZOFRAN-ODT   4 mg, Translingual, Every 8 Hours PRN      traMADol 50 MG tablet  Commonly known as: ULTRAM   50 mg, Oral, Every 6 Hours PRN             Continue These Medications        Instructions Start Date   buPROPion  MG 24 hr tablet  Commonly known as: Wellbutrin XL   300 mg, Oral, Every Morning      busPIRone 10 MG tablet  Commonly known as: BUSPAR   10 mg, Oral, 3 Times Daily      cloNIDine 0.1 MG tablet  Commonly known as: Catapres   0.1 mg, Oral, 2 Times Daily PRN      Enoxaparin Sodium 40 MG/0.4ML solution prefilled syringe syringe  Commonly known as: LOVENOX   40 mg, Subcutaneous, Every 12 Hours Scheduled, Start after surgery unless instructed otherwise      escitalopram 20 MG tablet  Commonly known as: Lexapro   20 mg, Oral, Daily      folic acid-vit B6-vit B12 2.5-25-1 MG tablet tablet  Commonly known as: FOLBEE   1 tablet, Oral, Daily      levothyroxine 25 MCG tablet  Commonly known as: Synthroid   25 mcg, Oral, Every Early Morning      Mirena (52 MG) 20 MCG/DAY intrauterine device IUD  Generic drug: Levonorgestrel   1 each, Intrauterine      ondansetron 4 MG  tablet  Commonly known as: Zofran   4 mg, Oral, Every 8 Hours PRN      traZODone 50 MG tablet  Commonly known as: DESYREL   50 mg, Oral, Nightly PRN      ursodiol 300 MG capsule  Commonly known as: Actigall   300 mg, Oral, 2 Times Daily      Vitamin D3 50 MCG (2000 UT) tablet   1 tablet, Oral, Daily               Discharge instructions:  Per Bariatric manual; per our protocol      Follow-up appointment: Follow up with Dr. Moore in the office as scheduled.  If not already scheduled call for appointment at [phone].

## 2024-03-06 ENCOUNTER — TRANSITIONAL CARE MANAGEMENT TELEPHONE ENCOUNTER (OUTPATIENT)
Dept: CALL CENTER | Facility: HOSPITAL | Age: 29
End: 2024-03-06
Payer: COMMERCIAL

## 2024-03-06 LAB
LAB AP CASE REPORT: NORMAL
PATH REPORT.FINAL DX SPEC: NORMAL
PATH REPORT.GROSS SPEC: NORMAL

## 2024-03-06 NOTE — OUTREACH NOTE
Call Center TCM Note      Flowsheet Row Responses   [facility], [place], operated by [facility] patient discharged from? [place]   Does the patient have one of the following disease processes/diagnoses(primary or secondary)? General Surgery   TCM attempt successful? No  [no verbal release]   Unsuccessful attempts Attempt 1   Call Status Left message            Rosaline Dial RN    3/6/2024, 08:27 EST

## 2024-03-06 NOTE — OUTREACH NOTE
Call Center TCM Note      Flowsheet Row Responses   [facility] patient discharged from? [place]   Does the patient have one of the following disease processes/diagnoses(primary or secondary)? General Surgery   TCM attempt successful? No   Unsuccessful attempts Attempt 2            Rosaline Dial RN    3/6/2024, 15:47 EST

## 2024-03-07 ENCOUNTER — TRANSITIONAL CARE MANAGEMENT TELEPHONE ENCOUNTER (OUTPATIENT)
Dept: CALL CENTER | Facility: HOSPITAL | Age: 29
End: 2024-03-07
Payer: COMMERCIAL

## 2024-03-07 NOTE — OUTREACH NOTE
Call Center TCM Note      Flowsheet Row Responses   [facility], [place], operated by [facility] patient discharged from? [place]   Does the patient have one of the following disease processes/diagnoses(primary or secondary)? General Surgery   TCM attempt successful? No   Unsuccessful attempts Attempt 3            Kala Moore RN    3/7/2024, 08:22 EST

## 2024-03-08 ENCOUNTER — OFFICE VISIT (OUTPATIENT)
Dept: BARIATRICS/WEIGHT MGMT | Facility: CLINIC | Age: 29
End: 2024-03-08
Payer: COMMERCIAL

## 2024-03-08 VITALS
SYSTOLIC BLOOD PRESSURE: 132 MMHG | DIASTOLIC BLOOD PRESSURE: 88 MMHG | TEMPERATURE: 98.6 F | HEART RATE: 80 BPM | WEIGHT: 293 LBS | HEIGHT: 69 IN | BODY MASS INDEX: 43.4 KG/M2

## 2024-03-08 DIAGNOSIS — Z71.3 DIETARY COUNSELING: ICD-10-CM

## 2024-03-08 DIAGNOSIS — E66.01 OBESITY, CLASS III, BMI 40-49.9 (MORBID OBESITY): Primary | ICD-10-CM

## 2024-03-08 DIAGNOSIS — Z98.84 S/P LAPAROSCOPIC SLEEVE GASTRECTOMY: ICD-10-CM

## 2024-03-08 RX ORDER — OMEPRAZOLE 20 MG/1
20 CAPSULE, DELAYED RELEASE ORAL DAILY
COMMUNITY

## 2024-03-08 RX ORDER — SCOLOPAMINE TRANSDERMAL SYSTEM 1 MG/1
1 PATCH, EXTENDED RELEASE TRANSDERMAL
Qty: 3 EACH | Refills: 0 | Status: SHIPPED | OUTPATIENT
Start: 2024-03-08

## 2024-03-08 RX ORDER — HYOSCYAMINE SULFATE 0.12 MG/1
0.12 TABLET SUBLINGUAL EVERY 6 HOURS PRN
Qty: 30 EACH | Refills: 0 | Status: SHIPPED | OUTPATIENT
Start: 2024-03-08

## 2024-03-08 NOTE — PROGRESS NOTES
[facility] BARIATRIC SURGERY  [address]  [facility] [id number] [phone]  [address]  [facility] [id number] [phone]  Dept: [phone]  3/8/2024      Purvi Foreman.  [id number]  [id number]  1995  female      Chief Complaint   Patient presents with    Post-op     4 days PO sleeve       BH Post-Op Bariatric Surgery:   Purvi Foreman is status post laparopscopic Laparoscopic Sleeve/HH procedure, performed on 3/4/2024.     HPI:   Today's weight is 135 kg (297 lb) pounds, today's BMI is Body mass index is 43.25 kg/m².,HE@ has a  loss of 15 pounds since surgery. The patient reports a decreased portion size and loss of appetite.  Purvi Foreman denies nausea, vomiting, reflux, dysphagia and reports tolerating clear liquids. The patient c/o appropriate post-op incisional discomfort that is improving. she is doing well with protein and water intake so far. Taking their vitamins, walking and using IS. Denies fevers, chills, chest pain or shortness of air.   Got jxpsdct0g today and is tolerating well.  Tolerating vitamins well.  Having some pain in her chest for a few seconds after she drinks.       Diet and Exercise: Diet history reviewed and discussed with the patient. Weight loss/gains to date discussed with the patient. No carbonated beverage consumption and exercising regularly- walking frequently.   Supplements: multivitamins, B-12, calcium, iron, B-1 and Vitamin D.   Patient is taking blood thinner as prescribed: Lovenox  Current outpatient and discharge medications have been reconciled for the patient.  Reviewed by: Gertrude Grant, RHIANNA        Review of Systems   Constitutional:  Positive for activity change and appetite change.   Respiratory:  Negative for shortness of breath.    Cardiovascular:  Negative for chest pain.   Gastrointestinal:  Positive for abdominal pain and nausea.   All other systems reviewed  and are negative.      Patient Active Problem List   Diagnosis    Abnormal thyroid blood test    YUMI (generalized anxiety disorder)    Low libido    Chronic fatigue    Irregular menses    Encounter for other contraceptive management    Obesity, Class III, BMI 40-49.9 (morbid obesity)    Major depression, recurrent, full remission    Snoring    Dyspepsia    Dietary counseling    Chronic low back pain    Hiatal hernia    S/P laparoscopic sleeve gastrectomy with HHR       The following portions of the patient's history were reviewed and updated as appropriate: allergies, current medications, past medical history, past surgical history, and problem list.    Vitals:    03/08/24 0933   BP: 132/88   Pulse: 80   Temp: 98.6 °F (37 °C)       Physical Exam  Vitals reviewed.   Constitutional:       General: She is awake. She is not in acute distress.     Appearance: She is morbidly obese.   HENT:      Head: Normocephalic and atraumatic.      Mouth/Throat:      Mouth: Mucous membranes are moist.   Eyes:      General: No scleral icterus.     Extraocular Movements: Extraocular movements intact.      Conjunctiva/sclera: Conjunctivae normal.      Pupils: Pupils are equal, round, and reactive to light.   Cardiovascular:      Rate and Rhythm: Normal rate and regular rhythm.   Pulmonary:      Effort: Pulmonary effort is normal. No respiratory distress.   Abdominal:      General: Abdomen is flat. Bowel sounds are normal. There is no distension.      Palpations: Abdomen is soft.      Tenderness: There is no abdominal tenderness. There is no guarding.      Comments: Incisions clean, dry, intact; no erythema   Musculoskeletal:         General: Normal range of motion.      Cervical back: Normal range of motion and neck supple.   Skin:     General: Skin is warm and dry.   Neurological:      General: No focal deficit present.      Mental Status: She is alert and oriented to person, place, and time.   Psychiatric:         Mood and Affect: Mood  normal.         Behavior: Behavior normal. Behavior is cooperative.         Thought Content: Thought content normal.         Judgment: Judgment normal.         Assessment:   Post-op, the patient is doing well.     Encounter Diagnoses   Name Primary?    Obesity, Class III, BMI 40-49.9 (morbid obesity) Yes    S/P laparoscopic sleeve gastrectomy with HHR     Dietary counseling        Plan:   Will send in levsin for spasms.   Requested Scopolamine and sent in 3 patches  Reviewed with patient the importance of following the manual for diet progression. Increase activity as tolerated. Continue increasing daily intake of protein and water.   Return to work: the patient is to return to 3 weeks from their surgery date with no restrictions unless they develop medical problems in which we will see them back in the office. They received a note in our office today with their return to work date.  Activity restrictions: no lifting, pushing or pulling over 25lbs for 3 weeks.   Recommended patient be sure to get at least 70 grams of protein per day. Discussed with the patient the recommended amount of water per day to intake. Reviewed vitamin requirements. Be sure to do routine exercise and increase activity as tolerated. No asa, nsaids or steroids for 8 weeks if gastric sleeve procedure and lifelong if gastric bypass procedure.. Patient may use miralax as needed if necessary.     Instructions / Recommendations: dietary counseling recommended, recommended a daily protein intake of  grams, vitamin supplement(s) recommended, recommended exercising at least 150 minutes per week, behavior modifications recommended and instructed to call the office for concerns, questions, or problems.     The patient was instructed to follow up at one month follow up appt.     The patient was counseled regarding post op bariatric manual

## 2024-03-13 ENCOUNTER — TELEPHONE (OUTPATIENT)
Dept: BARIATRICS/WEIGHT MGMT | Facility: CLINIC | Age: 29
End: 2024-03-13
Payer: COMMERCIAL

## 2024-03-13 ENCOUNTER — OFFICE VISIT (OUTPATIENT)
Dept: BARIATRICS/WEIGHT MGMT | Facility: CLINIC | Age: 29
End: 2024-03-13
Payer: COMMERCIAL

## 2024-03-13 VITALS
HEART RATE: 91 BPM | HEIGHT: 69 IN | DIASTOLIC BLOOD PRESSURE: 79 MMHG | SYSTOLIC BLOOD PRESSURE: 112 MMHG | BODY MASS INDEX: 43.25 KG/M2 | WEIGHT: 292 LBS | TEMPERATURE: 97.5 F

## 2024-03-13 DIAGNOSIS — R53.82 CHRONIC FATIGUE: ICD-10-CM

## 2024-03-13 DIAGNOSIS — K44.9 HIATAL HERNIA: ICD-10-CM

## 2024-03-13 DIAGNOSIS — E66.01 CLASS 3 SEVERE OBESITY WITH SERIOUS COMORBIDITY AND BODY MASS INDEX (BMI) OF 40.0 TO 44.9 IN ADULT, UNSPECIFIED OBESITY TYPE: Primary | ICD-10-CM

## 2024-03-13 DIAGNOSIS — Z98.84 S/P LAPAROSCOPIC SLEEVE GASTRECTOMY: ICD-10-CM

## 2024-03-13 PROBLEM — E66.813 OBESITY, CLASS III, BMI 40-49.9 (MORBID OBESITY): Status: RESOLVED | Noted: 2023-04-06 | Resolved: 2024-03-13

## 2024-03-13 PROBLEM — E66.813 CLASS 3 SEVERE OBESITY WITH SERIOUS COMORBIDITY AND BODY MASS INDEX (BMI) OF 40.0 TO 44.9 IN ADULT: Status: ACTIVE | Noted: 2023-04-06

## 2024-03-13 PROCEDURE — 99024 POSTOP FOLLOW-UP VISIT: CPT | Performed by: NURSE PRACTITIONER

## 2024-03-13 NOTE — TELEPHONE ENCOUNTER
----- Message from Purvi Foreman sent at 3/13/2024  8:57 AM EDT -----  Regarding: Possible incision(s) infected?  Contact: [phone]  Attached are 2 photos of my incisions as of this morning. I am 9 days post op gastric sleeve.    Thank you!

## 2024-03-13 NOTE — PROGRESS NOTES
[facility] BARIATRIC SURGERY  [address]  [facility] [address]  [phone]  [address]  [facility] [id number] [phone]  Dept: [phone]  3/13/2024      Purvi Foreman.  [id number]  [id number]  1995  female      Chief Complaint   Patient presents with    Post-op     9 day post op sleeve       BH Post-Op Bariatric Surgery:   Purvi Foreman is status post Laparoscopic Sleeve/HH procedure, performed on 3/4/24     HPI:       Today's weight is 132 kg (292 lb) pounds, today's BMI is Body mass index is 42.52 kg/m²., she has a loss of 5 pounds since the last visit and her weight loss since surgery is 5 pounds. The patient reports a decreased portion size and loss of appetite.    Purvi Foreman denies nausea, vomiting, reflux and reports intermittent nausea after trying to eat. She has been using a patch for nausea.      Diet and Exercise: Diet history reviewed and discussed with the patient. Weight loss/gains to date discussed with the patient. The patient states they are eating 60 grams of protein per day. She reports eating 3 meals per day, a typical portion size of 1/2 cup, eating 1 snacks per day, drinking 5-6 or more 8-oz. glasses of water per day, no carbonated beverage consumption and exercising regularly- exercise    Supplements: celebrate.     Review of Systems   Constitutional:  Positive for appetite change. Negative for fatigue and unexpected weight change.   HENT: Negative.     Eyes: Negative.    Respiratory: Negative.     Cardiovascular: Negative.  Negative for leg swelling.   Gastrointestinal:  Negative for abdominal distention, abdominal pain, constipation, diarrhea, nausea and vomiting.   Genitourinary:  Negative for difficulty urinating, frequency and urgency.   Musculoskeletal:  Negative for back pain.   Skin: Negative.    Psychiatric/Behavioral: Negative.     All other systems reviewed and are  negative.      Patient Active Problem List   Diagnosis    Abnormal thyroid blood test    YUMI (generalized anxiety disorder)    Low libido    Chronic fatigue    Irregular menses    Encounter for other contraceptive management    Class 3 severe obesity with serious comorbidity and body mass index (BMI) of 40.0 to 44.9 in adult    Major depression, recurrent, full remission    Snoring    Dyspepsia    Dietary counseling    Chronic low back pain    Hiatal hernia    S/P laparoscopic sleeve gastrectomy with HHR       Past Medical History:   Diagnosis Date    Anxiety     Depression     Disease of thyroid gland     Foot pain, bilateral     History of gestational hypertension     Hypertension     No meds    Migraine WITH aura     Seasonal allergies        The following portions of the patient's history were reviewed and updated as appropriate: allergies, current medications, past family history, past medical history, past social history, past surgical history, and problem list.    Vitals:    03/13/24 1409   BP: 112/79   Pulse: 91   Temp: 97.5 °F (36.4 °C)       Physical Exam  Vitals and nursing note reviewed.   Constitutional:       Appearance: She is well-developed.   Neck:      Thyroid: No thyromegaly.   Cardiovascular:      Rate and Rhythm: Normal rate.   Pulmonary:      Effort: Pulmonary effort is normal. No respiratory distress.   Abdominal:      Palpations: Abdomen is soft.   Musculoskeletal:         General: No tenderness.   Skin:     General: Skin is warm and dry.   Neurological:      Mental Status: She is alert.   Psychiatric:         Behavior: Behavior normal.         Assessment:   Post-op, the patient is doing well.     Encounter Diagnoses   Name Primary?    Class 3 severe obesity with serious comorbidity and body mass index (BMI) of 40.0 to 44.9 in adult, unspecified obesity type Yes    S/P laparoscopic sleeve gastrectomy with HHR     Hiatal hernia     Chronic fatigue        Plan:   Encouraged patient to be sure  to get plenty of lean protein per day through small frequent meals all with a protein source.   Activity restrictions: none.   Recommended patient be sure to get at least 70 grams of protein per day by eating small, frequent meals all with high lean protein choices. Be sure to limit/cut back on daily carbohydrate intake. Discussed with the patient the recommended amount of water per day to intake- half of body weight in ounces. Reviewed vitamin requirements. Be sure to do routine exercise, 150 minutes per week minimum, including both cardio and strength training.     Instructions / Recommendations: dietary counseling recommended, recommended a daily protein intake of  grams, vitamin supplement(s) recommended, recommended exercising at least 150 minutes per week, behavior modifications recommended and instructed to call the office for concerns, questions, or problems.     The patient was instructed to follow up in 1 month .     . Total time spent during this encounter today was 25 minutes

## 2024-03-13 NOTE — TELEPHONE ENCOUNTER
Called and spoke to patient. She is 9 day post op gastric sleeve. Patient stated incisions are warm to touch, redness, itchiness and maybe infected. Patient stated that has tried benadryl cream with minimal relief. No fever, no other symptoms patient is having. Added patient on for same day appointment with provider.

## 2024-03-19 ENCOUNTER — HOSPITAL ENCOUNTER (EMERGENCY)
Facility: HOSPITAL | Age: 29
Discharge: HOME | End: 2024-03-19
Attending: EMERGENCY MEDICINE | Admitting: EMERGENCY MEDICINE
Payer: COMMERCIAL

## 2024-03-19 ENCOUNTER — TELEPHONE (OUTPATIENT)
Dept: BARIATRICS/WEIGHT MGMT | Facility: CLINIC | Age: 29
End: 2024-03-19
Payer: COMMERCIAL

## 2024-03-19 VITALS
HEART RATE: 84 BPM | OXYGEN SATURATION: 100 % | DIASTOLIC BLOOD PRESSURE: 71 MMHG | WEIGHT: 287.26 LBS | SYSTOLIC BLOOD PRESSURE: 111 MMHG | HEIGHT: 70 IN | RESPIRATION RATE: 16 BRPM | TEMPERATURE: 98.4 F | BODY MASS INDEX: 41.12 KG/M2

## 2024-03-19 DIAGNOSIS — R11.2 NAUSEA AND VOMITING, UNSPECIFIED VOMITING TYPE: Primary | ICD-10-CM

## 2024-03-19 DIAGNOSIS — R13.10 DYSPHAGIA, UNSPECIFIED TYPE: Primary | ICD-10-CM

## 2024-03-19 DIAGNOSIS — N39.0 URINARY TRACT INFECTION WITHOUT HEMATURIA, SITE UNSPECIFIED: ICD-10-CM

## 2024-03-19 DIAGNOSIS — R11.2 NAUSEA AND VOMITING, UNSPECIFIED VOMITING TYPE: ICD-10-CM

## 2024-03-19 DIAGNOSIS — Z98.84 S/P LAPAROSCOPIC SLEEVE GASTRECTOMY: ICD-10-CM

## 2024-03-19 LAB
ALBUMIN SERPL-MCNC: 4.3 G/DL (ref 3.5–5.2)
ALBUMIN/GLOB SERPL: 1.5 G/DL
ALP SERPL-CCNC: 72 U/L (ref 39–117)
ALT SERPL W P-5'-P-CCNC: 10 U/L (ref 1–33)
ANION GAP SERPL CALCULATED.3IONS-SCNC: 10.9 MMOL/L (ref 5–15)
AST SERPL-CCNC: 14 U/L (ref 1–32)
BACTERIA UR QL AUTO: ABNORMAL /HPF
BASOPHILS # BLD AUTO: 0.02 10*3/MM3 (ref 0–0.2)
BASOPHILS NFR BLD AUTO: 0.4 % (ref 0–1.5)
BILIRUB SERPL-MCNC: 0.7 MG/DL (ref 0–1.2)
BILIRUB UR QL STRIP: NEGATIVE
BUN SERPL-MCNC: 9 MG/DL (ref 6–20)
BUN/CREAT SERPL: 11.3 (ref 7–25)
CALCIUM SPEC-SCNC: 9.3 MG/DL (ref 8.6–10.5)
CHLORIDE SERPL-SCNC: 105 MMOL/L (ref 98–107)
CLARITY UR: ABNORMAL
CO2 SERPL-SCNC: 24.1 MMOL/L (ref 22–29)
COLOR UR: ABNORMAL
CREAT SERPL-MCNC: 0.8 MG/DL (ref 0.57–1)
DEPRECATED RDW RBC AUTO: 43 FL (ref 37–54)
EGFRCR SERPLBLD CKD-EPI 2021: 101.8 ML/MIN/1.73
EOSINOPHIL # BLD AUTO: 0.15 10*3/MM3 (ref 0–0.4)
EOSINOPHIL NFR BLD AUTO: 3.3 % (ref 0.3–6.2)
ERYTHROCYTE [DISTWIDTH] IN BLOOD BY AUTOMATED COUNT: 14 % (ref 12.3–15.4)
GLOBULIN UR ELPH-MCNC: 2.9 GM/DL
GLUCOSE SERPL-MCNC: 92 MG/DL (ref 65–99)
GLUCOSE UR STRIP-MCNC: NEGATIVE MG/DL
HCG INTACT+B SERPL-ACNC: <0.5 MIU/ML
HCT VFR BLD AUTO: 40 % (ref 34–46.6)
HGB BLD-MCNC: 12.9 G/DL (ref 12–15.9)
HGB UR QL STRIP.AUTO: NEGATIVE
HOLD SPECIMEN: NORMAL
HOLD SPECIMEN: NORMAL
HYALINE CASTS UR QL AUTO: ABNORMAL /LPF
IMM GRANULOCYTES # BLD AUTO: 0.01 10*3/MM3 (ref 0–0.05)
IMM GRANULOCYTES NFR BLD AUTO: 0.2 % (ref 0–0.5)
KETONES UR QL STRIP: ABNORMAL
LEUKOCYTE ESTERASE UR QL STRIP.AUTO: ABNORMAL
LIPASE SERPL-CCNC: 81 U/L (ref 13–60)
LYMPHOCYTES # BLD AUTO: 1.74 10*3/MM3 (ref 0.7–3.1)
LYMPHOCYTES NFR BLD AUTO: 38.7 % (ref 19.6–45.3)
MCH RBC QN AUTO: 27 PG (ref 26.6–33)
MCHC RBC AUTO-ENTMCNC: 32.3 G/DL (ref 31.5–35.7)
MCV RBC AUTO: 83.7 FL (ref 79–97)
MONOCYTES # BLD AUTO: 0.33 10*3/MM3 (ref 0.1–0.9)
MONOCYTES NFR BLD AUTO: 7.3 % (ref 5–12)
NEUTROPHILS NFR BLD AUTO: 2.25 10*3/MM3 (ref 1.7–7)
NEUTROPHILS NFR BLD AUTO: 50.1 % (ref 42.7–76)
NITRITE UR QL STRIP: NEGATIVE
NRBC BLD AUTO-RTO: 0 /100 WBC (ref 0–0.2)
PH UR STRIP.AUTO: 5.5 [PH] (ref 5–8)
PLATELET # BLD AUTO: 227 10*3/MM3 (ref 140–450)
PMV BLD AUTO: 11 FL (ref 6–12)
POTASSIUM SERPL-SCNC: 4.3 MMOL/L (ref 3.5–5.2)
PROT SERPL-MCNC: 7.2 G/DL (ref 6–8.5)
PROT UR QL STRIP: ABNORMAL
RBC # BLD AUTO: 4.78 10*6/MM3 (ref 3.77–5.28)
RBC # UR STRIP: ABNORMAL /HPF
REF LAB TEST METHOD: ABNORMAL
SODIUM SERPL-SCNC: 140 MMOL/L (ref 136–145)
SP GR UR STRIP: >1.03 (ref 1–1.03)
SQUAMOUS #/AREA URNS HPF: ABNORMAL /HPF
UROBILINOGEN UR QL STRIP: ABNORMAL
WBC # UR STRIP: ABNORMAL /HPF
WBC NRBC COR # BLD AUTO: 4.5 10*3/MM3 (ref 3.4–10.8)
WHOLE BLOOD HOLD COAG: NORMAL
WHOLE BLOOD HOLD SPECIMEN: NORMAL

## 2024-03-19 PROCEDURE — 85025 COMPLETE CBC W/AUTO DIFF WBC: CPT

## 2024-03-19 PROCEDURE — 25810000003 SODIUM CHLORIDE 0.9 % SOLUTION: Performed by: REGISTERED NURSE

## 2024-03-19 PROCEDURE — 80053 COMPREHEN METABOLIC PANEL: CPT | Performed by: EMERGENCY MEDICINE

## 2024-03-19 PROCEDURE — 84702 CHORIONIC GONADOTROPIN TEST: CPT | Performed by: EMERGENCY MEDICINE

## 2024-03-19 PROCEDURE — 25010000002 ONDANSETRON PER 1 MG: Performed by: REGISTERED NURSE

## 2024-03-19 PROCEDURE — 25010000002 FOSAPREPITANT PER 1 MG: Performed by: REGISTERED NURSE

## 2024-03-19 PROCEDURE — 83690 ASSAY OF LIPASE: CPT | Performed by: EMERGENCY MEDICINE

## 2024-03-19 PROCEDURE — 81001 URINALYSIS AUTO W/SCOPE: CPT | Performed by: EMERGENCY MEDICINE

## 2024-03-19 PROCEDURE — 25010000002 GENTAMICIN PER 80 MG: Performed by: REGISTERED NURSE

## 2024-03-19 PROCEDURE — 99283 EMERGENCY DEPT VISIT LOW MDM: CPT

## 2024-03-19 PROCEDURE — 96365 THER/PROPH/DIAG IV INF INIT: CPT

## 2024-03-19 PROCEDURE — 87086 URINE CULTURE/COLONY COUNT: CPT | Performed by: REGISTERED NURSE

## 2024-03-19 PROCEDURE — 96367 TX/PROPH/DG ADDL SEQ IV INF: CPT

## 2024-03-19 RX ORDER — SODIUM CHLORIDE 0.9 % (FLUSH) 0.9 %
10 SYRINGE (ML) INJECTION AS NEEDED
Status: DISCONTINUED | OUTPATIENT
Start: 2024-03-19 | End: 2024-03-19 | Stop reason: HOSPADM

## 2024-03-19 RX ORDER — METOCLOPRAMIDE 10 MG/1
10 TABLET ORAL EVERY 6 HOURS PRN
Qty: 12 TABLET | Refills: 0 | Status: SHIPPED | OUTPATIENT
Start: 2024-03-19 | End: 2024-03-19

## 2024-03-19 RX ORDER — ONDANSETRON 2 MG/ML
4 INJECTION INTRAMUSCULAR; INTRAVENOUS ONCE
Status: COMPLETED | OUTPATIENT
Start: 2024-03-19 | End: 2024-03-19

## 2024-03-19 RX ORDER — METOCLOPRAMIDE 10 MG/1
10 TABLET ORAL EVERY 6 HOURS PRN
Qty: 12 TABLET | Refills: 0 | Status: SHIPPED | OUTPATIENT
Start: 2024-03-19

## 2024-03-19 RX ORDER — SODIUM CHLORIDE, SODIUM LACTATE, POTASSIUM CHLORIDE, CALCIUM CHLORIDE 600; 310; 30; 20 MG/100ML; MG/100ML; MG/100ML; MG/100ML
1000 INJECTION, SOLUTION INTRAVENOUS ONCE
OUTPATIENT
Start: 2024-03-19

## 2024-03-19 RX ADMIN — GENTAMICIN SULFATE 470 MG: 40 INJECTION, SOLUTION INTRAMUSCULAR; INTRAVENOUS at 17:51

## 2024-03-19 RX ADMIN — FOSAPREPITANT 150 MG: 150 INJECTION, POWDER, LYOPHILIZED, FOR SOLUTION INTRAVENOUS at 18:47

## 2024-03-19 RX ADMIN — ONDANSETRON 4 MG: 2 INJECTION INTRAMUSCULAR; INTRAVENOUS at 16:42

## 2024-03-19 RX ADMIN — SODIUM CHLORIDE 1000 ML: 9 INJECTION, SOLUTION INTRAVENOUS at 16:42

## 2024-03-19 NOTE — ED NOTES
Patient has been provided a urine cup for a urine sample. Patient reports not being able to provide a sample at this time.

## 2024-03-19 NOTE — DISCHARGE INSTRUCTIONS
You have a urinary tract infection.  We gave you a single dose of antibiotic in the ED today and you should not need further antibiotics.  Your urine was sent for culture.    I spoke with your surgeon, Dr. Moore.  He recommends gastric rest for 24 hours.  I have sent Reglan to your pharmacy to help with the nausea.  He wants to follow-up in the office with you.    Return for worsening symptoms or new concerns.

## 2024-03-19 NOTE — TELEPHONE ENCOUNTER
I spoke with patient today, she has been vomiting and can not hold anything down for 5 days. Gem put an order in for fluids however [facility] does not have any availability today. I advised her to go to the ER for fluids because it would be 24 hours before they had availability. Patient thought that sounded good. She will call back tomorrow and let Emmett know how she is doing because I will not be here and I want to make sure she is ok

## 2024-03-19 NOTE — ED PROVIDER NOTES
Time: 4:19 PM EDT  Date of encounter:  3/19/2024  Independent Historian/Clinical History and Information was obtained by:   Patient and Family    History is limited by: N/A    Chief Complaint: Nausea/vomiting      History of Present Illness:  Patient is a 29 y.o. year old female who presents to the emergency department companied by her  for evaluation of nausea/vomiting for the past 5 days and has progressively worsened.  Patient denies fever/chills, urinary frequency/painful urination, diarrhea or constipation.  Patient reports she had gastric sleeve surgery on 3/4/2024 in [place].  States that initially she had no problems following the meal plan and tolerating foods.  Was seen on Tuesday last week for follow-up and only concern at that point was a rash from the glue from the surgical sites.  This has since resolved.  Vomiting did not start until after that visit.    HPI    Patient Care Team  Primary Care Provider: Kendrick Thompson Jr., MD    Past Medical History:     Allergies   Allergen Reactions    Cephalexin Anaphylaxis    Penicillins Swelling and Rash    Latex Rash     Past Medical History:   Diagnosis Date    Anxiety     Depression     Disease of thyroid gland     Foot pain, bilateral     History of gestational hypertension     Hypertension     No meds    Migraine WITH aura     Nausea and vomiting 3/19/2024    Seasonal allergies      Past Surgical History:   Procedure Laterality Date    APPENDECTOMY  2012    ENDOSCOPY N/A 1/30/2024    Procedure: ESOPHAGOGASTRODUODENOSCOPY WITH BIOPSY;  Surgeon: Josue Moore Jr., MD;  Location: [facility] ENDOSCOPY;  Service: General;  Laterality: N/A;  PRE- DYSPEPSIA  POST- GASTRITIS, HIATAL HERNIA    GASTRIC SLEEVE LAPAROSCOPIC N/A 3/4/2024    Procedure: Sleeve gastrectomy LAPAROSCOPIC WITH DAVINCI ROBOT With Hiatal Hernia Repair;  Surgeon: Josue Moore Jr., MD;  [place] [facility] [place] [facility] [place]  Service: Robotics - DaVinci;  Laterality: N/A;    RETAINED  PLACENTA REMOVAL  2019    D+C w second tri loss     Family History   Problem Relation Age of Onset    Uterine cancer Mother         endometrial hyperplasia s/p hyst    Endometrial cancer Mother         HYPERPLASIA    Hypertension Mother     Hypertension Father     Diabetes Father     Esophageal cancer Maternal Grandfather     Lung cancer Paternal Grandmother     Stroke Neg Hx     Breast cancer Neg Hx     Colon cancer Neg Hx     Ovarian cancer Neg Hx     Pulmonary embolism Neg Hx     Deep vein thrombosis Neg Hx     Malig Hyperthermia Neg Hx        Home Medications:  Prior to Admission medications    Medication Sig Start Date End Date Taking? Authorizing Provider   buPROPion XL (Wellbutrin XL) 300 MG 24 hr tablet Take 1 tablet by mouth Every Morning. 1/29/24   Freddy Calvo MD   busPIRone (BUSPAR) 10 MG tablet Take 1 tablet by mouth 3 (Three) Times a Day. 1/29/24   Freddy Calvo MD   Cholecalciferol (Vitamin D3) 50 MCG (2000 UT) tablet Take 1 tablet by mouth Daily. 12/23/23   Shayy Ontiveros MD   cloNIDine (Catapres) 0.1 MG tablet Take 1 tablet by mouth 2 (Two) Times a Day As Needed (anxiety/irritability). 1/29/24   Freddy Calvo MD   escitalopram (Lexapro) 20 MG tablet Take 1 tablet by mouth Daily. 11/21/23   Kendrick Thompson Jr., MD   folic acid-vit B6-vit B12 (FOLBEE) 2.5-25-1 MG tablet tablet Take 1 tablet by mouth Daily. 2/22/24   Josue Moore Jr., MD   Hyoscyamine Sulfate SL (Levsin/SL) 0.125 MG sublingual tablet Place 0.125 mg under the tongue Every 6 (Six) Hours As Needed (stomach spasms). 3/8/24   Gertrude Grant APRN   Levonorgestrel (Mirena, 52 MG,) 20 MCG/DAY intrauterine device IUD 1 each by Intrauterine route.    Shayy Ontiveros MD   levothyroxine (Synthroid) 25 MCG tablet Take 1 tablet by mouth Every Morning. 11/21/23   Kendrick Thompson Jr., MD   omeprazole (priLOSEC) 20 MG capsule Take 1 capsule by mouth Daily.    Shayy Ontiveros MD  "  ondansetron (Zofran) 4 MG tablet Take 1 tablet by mouth Every 8 (Eight) Hours As Needed for Nausea or Vomiting. 2/20/24   Kendrick Thompson Jr., MD   ondansetron ODT (ZOFRAN-ODT) 4 MG disintegrating tablet Place 1 tablet on the tongue Every 8 (Eight) Hours As Needed for Nausea. 3/5/24   Gertrude Grant APRN   Scopolamine 1 MG/3DAYS patch Place 1 patch on the skin as directed by provider Every 72 (Seventy-Two) Hours. 3/8/24   Gertrude Grant APRDANK   traZODone (DESYREL) 50 MG tablet Take 1 tablet by mouth At Night As Needed for Sleep. 11/21/23   Kendrick Thompson Jr., MD   ursodiol (Actigall) 300 MG capsule Take 1 capsule by mouth 2 (Two) Times a Day. 2/22/24   Josue Moore Jr., MD        Social History:   Social History     Tobacco Use    Smoking status: Never    Smokeless tobacco: Never   Vaping Use    Vaping status: Never Used   Substance Use Topics    Alcohol use: Not Currently     Comment: drinks rarely    Drug use: Never         Review of Systems:  Review of Systems   I performed a 10 point review of systems which was all negative, except for the positives found in the HPI above.  Physical Exam:  /71   Pulse 84   Temp 98.4 °F (36.9 °C) (Oral)   Resp 16   Ht 177.8 cm (70\")   Wt 130 kg (287 lb 4.2 oz)   SpO2 100%   BMI 41.22 kg/m²     Physical Exam     General: Awake alert and in no acute distress     HEENT: Head normocephalic atraumatic, eyes PERRLA EOMI, nose normal, oropharynx normal.     Neck: Supple full range of motion, no meningismus, no lymphadenopathy     Heart: Regular rate and rhythm, no murmurs or rubs, 2+ radial pulses bilaterally     Lungs: Clear to auscultation bilaterally without wheezes or crackles, no respiratory distress     Abdomen: Soft, nontender, nondistended, no rebound or guarding, no rigidity, no CVA tenderness, 5 small incisions CDI without signs of infection     Back exam:  No L-spine tenderness.  No rash.     Skin: Warm, dry, no rash   "   Musculoskeletal: Normal range of motion, no lower extremity edema     Neurologic: Oriented x3, no motor deficits no sensory deficits     Psychiatric: Mood appears stable, no psychosis          Procedures:  Procedures      Medical Decision Making:      Comorbidities that affect care:    Anxiety/depression, Hypertension, Thyroid Disease, Obesity    External Notes reviewed:    Previous Clinic Note: 3/13/2024, follow-up visit with bariatric surgery      The following orders were placed and all results were independently analyzed by me:  Orders Placed This Encounter   Procedures    Urine Culture - Urine,    [place] Draw    Comprehensive Metabolic Panel    Lipase    Urinalysis With Microscopic If Indicated (No Culture) - Urine, Clean Catch    hCG, Quantitative, Pregnancy    CBC Auto Differential    Urinalysis, Microscopic Only - Urine, Clean Catch    NPO Diet NPO Type: Strict NPO    Undress & Gown    IP General Consult (Use specialty-specific consult if known)    Insert Peripheral IV    CBC & Differential    Green Top (Gel)    Lavender Top    Gold Top - SST    Light Blue Top       Medications Given in the Emergency Department:  Medications   sodium chloride 0.9 % flush 10 mL (has no administration in time range)   sodium chloride 0.9 % bolus 1,000 mL (0 mL Intravenous Stopped 3/19/24 1751)   ondansetron (ZOFRAN) injection 4 mg (4 mg Intravenous Given 3/19/24 1642)   gentamicin (GARAMYCIN) 470 mg in sodium chloride 0.9 % 100 mL IVPB (0 mg Intravenous Stopped 3/19/24 1847)   fosaprepitant (EMEND) 150 mg in sodium chloride 0.9 % 100 mL IVPB (0 mg Intravenous Stopped 3/19/24 1917)        ED Course:         Labs:    Lab Results (last 24 hours)       Procedure Component Value Units Date/Time    CBC & Differential [[id number]]  (Normal) Collected: 03/19/24 1610    Specimen: Blood Updated: 03/19/24 1616    Narrative:      The following orders were created for panel order CBC & Differential.  Procedure                                Abnormality         Status                     ---------                               -----------         ------                     CBC Auto Differential[[id number]]        Normal              Final result                 Please view results for these tests on the individual orders.    Comprehensive Metabolic Panel [[id number]] Collected: 03/19/24 1610    Specimen: Blood Updated: 03/19/24 1635     Glucose 92 mg/dL      BUN 9 mg/dL      Creatinine 0.80 mg/dL      Sodium 140 mmol/L      Potassium 4.3 mmol/L      Comment: Slight hemolysis detected by analyzer. Result may be falsely elevated.        Chloride 105 mmol/L      CO2 24.1 mmol/L      Calcium 9.3 mg/dL      Total Protein 7.2 g/dL      Albumin 4.3 g/dL      ALT (SGPT) 10 U/L      AST (SGOT) 14 U/L      Alkaline Phosphatase 72 U/L      Total Bilirubin 0.7 mg/dL      Globulin 2.9 gm/dL      A/G Ratio 1.5 g/dL      BUN/Creatinine Ratio 11.3     Anion Gap 10.9 mmol/L      eGFR 101.8 mL/min/1.73     Narrative:      GFR Normal >60  Chronic Kidney Disease <60  Kidney Failure <15      Lipase [[id number]]  (Abnormal) Collected: 03/19/24 1610    Specimen: Blood Updated: 03/19/24 1635     Lipase 81 U/L     hCG, Quantitative, Pregnancy [[id number]] Collected: 03/19/24 1610    Specimen: Blood Updated: 03/19/24 1634     HCG Quantitative <0.50 mIU/mL     Narrative:      HCG Ranges by Gestational Age    Females - non-pregnant premenopausal   </= 1mIU/mL HCG  Females - postmenopausal               </= 7mIU/mL HCG    3 Weeks       5.4   -      72 mIU/mL  4 Weeks      10.2   -     708 mIU/mL  5 Weeks       217   -   8,245 mIU/mL  6 Weeks       152   -  32,177 mIU/mL  7 Weeks     4,059   - 153,767 mIU/mL  8 Weeks    31,366   - 149,094 mIU/mL  9 Weeks    59,109   - 135,901 mIU/mL  10 Weeks   44,186   - 170,409 mIU/mL  12 Weeks   27,107   - 201,615 mIU/mL  14 Weeks   24,302   -  93,646 mIU/mL  15 Weeks   12,540   -  69,747 mIU/mL  16 Weeks    8,904   -  55,332 mIU/mL  17 Weeks     8,240   -  51,793 mIU/mL  18 Weeks    9,649   -  55,271 mIU/mL      CBC Auto Differential [[id number]]  (Normal) Collected: 03/19/24 1610    Specimen: Blood Updated: 03/19/24 1616     WBC 4.50 10*3/mm3      RBC 4.78 10*6/mm3      Hemoglobin 12.9 g/dL      Hematocrit 40.0 %      MCV 83.7 fL      MCH 27.0 pg      MCHC 32.3 g/dL      RDW 14.0 %      RDW-SD 43.0 fl      MPV 11.0 fL      Platelets 227 10*3/mm3      Neutrophil % 50.1 %      Lymphocyte % 38.7 %      Monocyte % 7.3 %      Eosinophil % 3.3 %      Basophil % 0.4 %      Immature Grans % 0.2 %      Neutrophils, Absolute 2.25 10*3/mm3      Lymphocytes, Absolute 1.74 10*3/mm3      Monocytes, Absolute 0.33 10*3/mm3      Eosinophils, Absolute 0.15 10*3/mm3      Basophils, Absolute 0.02 10*3/mm3      Immature Grans, Absolute 0.01 10*3/mm3      nRBC 0.0 /100 WBC     Urinalysis With Microscopic If Indicated (No Culture) - Urine, Clean Catch [[id number]]  (Abnormal) Collected: 03/19/24 1645    Specimen: Urine, Clean Catch Updated: 03/19/24 1703     Color, UA Dark Yellow     Appearance, UA Cloudy     pH, UA 5.5     Specific Gravity, UA >1.030     Glucose, UA Negative     Ketones, UA 40 mg/dL (2+)     Bilirubin, UA Negative     Blood, UA Negative     Protein, UA Trace     Leuk Esterase, UA Trace     Nitrite, UA Negative     Urobilinogen, UA 1.0 E.U./dL    Urinalysis, Microscopic Only - Urine, Clean Catch [[id number]]  (Abnormal) Collected: 03/19/24 1645    Specimen: Urine, Clean Catch Updated: 03/19/24 1703     RBC, UA 6-10 /HPF      WBC, UA 11-20 /HPF      Bacteria, UA 2+ /HPF      Squamous Epithelial Cells, UA 3-6 /HPF      Hyaline Casts, UA 7-12 /LPF      Methodology Automated Microscopy    Urine Culture - Urine, Urine, Clean Catch [[id number]] Collected: 03/19/24 1645    Specimen: Urine, Clean Catch Updated: 03/19/24 1745             Imaging:    No Radiology Exams Resulted Within Past 24 Hours      Differential Diagnosis and Discussion:    Vomiting: Differential  diagnosis includes but is not limited to migraine, labyrinthine disorders, psychogenic, metabolic and endocrine causes, peptic ulcer, gastric outlet obstruction, gastritis, gastroenteritis, appendicitis, intestinal obstruction, paralytic ileus, food poisoning, cholecystitis, acute hepatitis, acute pancreatitis, acute febrile illness, and myocardial infarction.    All labs were reviewed and interpreted by me.    MDM  Number of Diagnoses or Management Options  Nausea and vomiting, unspecified vomiting type  Urinary tract infection without hematuria, site unspecified  Diagnosis management comments: The patient presented with nausea and vomiting for 5 days after having gastric sleeve surgery about 2 weeks ago. The patient´s CBC that was reviewed and interpreted by me shows no abnormalities of critical concern. Of note, there is no anemia requiring a blood transfusion and the platelet count is acceptable.  The patient´s CMP that was reviewed and interpreted by me shows no abnormalities of critical concern. Of note, the patient´s sodium and potassium are acceptable. The patient´s liver enzymes are unremarkable. The patient´s renal function (creatinine) is preserved. The patient has a normal anion gap.  Urinalysis with 2+ bacteria, 11-20 WBC.  Ketones in the urine consistent with dehydration patient was rehydrated with IV fluids. The patient´s urinalysis and urinary complaints, signs, symptoms, and diagnostic testing is consistent with a urinary tract infection. Patient is resting comfortably, is alert, and is in no distress. The repeat examination is unremarkable and benign. The patient has no signs of urosepsis. The patient was given a single dose of gentamicin due to allergies penicillins and cephalosporins in the emergency department.  I discussed the case with Dr. Moore, on-call bariatric surgeon, who request the patient receive Emend IV prior to discharge and then follow-up in the office.  Request the patient  practice gastric rest for 24 hours.  I discussed these recommendations with the patient and she voiced understanding.  The patient was counseled to return to the ER for fever >100.5, intractable pain or vomiting, or any other concerns that they may have. The patient has expressed a clear and thorough understanding and agreed to follow up as instructed.       Amount and/or Complexity of Data Reviewed  Clinical lab tests: reviewed and ordered    Risk of Complications, Morbidity, and/or Mortality  Presenting problems: moderate  Diagnostic procedures: low  Management options: minimal    Patient Progress  Patient progress: improved                 Patient Care Considerations:    SEPSIS was considered but is NOT present in the emergency department as SIRS criteria is not present.      Consultants/Shared Management Plan:    Consultant: I have discussed the case with Dr. Moore, on-call for bariatrics, who states imaging is not required today and the patient should follow-up in the office.    Social Determinants of Health:    Patient is independent, reliable, and has access to care.       Disposition and Care Coordination:    Discharged: The patient is suitable and stable for discharge with no need for consideration of admission.        Final diagnoses:   Nausea and vomiting, unspecified vomiting type   Urinary tract infection without hematuria, site unspecified        ED Disposition       ED Disposition   Discharge    Condition   Stable    Comment   --               This medical record created using voice recognition software.             Destiny Rondon APRN  03/19/24 1951       Destiny Rondon APRN  03/19/24 1953

## 2024-03-20 ENCOUNTER — HOSPITAL ENCOUNTER (OUTPATIENT)
Dept: INFUSION THERAPY | Facility: HOSPITAL | Age: 29
Discharge: HOME | End: 2024-03-20
Payer: COMMERCIAL

## 2024-03-20 LAB — BACTERIA SPEC AEROBE CULT: NO GROWTH

## 2024-03-20 NOTE — PROGRESS NOTES
"Chief Complaint  binge eating disorder (Follow up )    Subjective          Purvi Foreman presents to [facility]  History of Present Illness  Patient follow-up after having sleeve gastrectomy. Patient reports ongoing nausea and vomiting. Patient has been unable to eat solids. She is supposed to be on gastric rest. She is only supposed to be drinking liquids at this time. She is taking reglan every 6 hours. Patient has returned to work already. She reports unable to keep any medication down other than the reglan. Patient reports increase anxiety recently. Patient denies abdominal pain. Patient has follow-up with bariatric surgeon on Apr 5.     Current Outpatient Medications   Medication Instructions    buPROPion XL (WELLBUTRIN XL) 300 mg, Oral, Every Morning    busPIRone (BUSPAR) 10 mg, Oral, 3 Times Daily    Cholecalciferol (Vitamin D3) 50 MCG (2000 UT) tablet 1 tablet, Daily    cloNIDine (CATAPRES) 0.1 mg, Oral, 2 Times Daily PRN    escitalopram (LEXAPRO) 20 mg, Oral, Daily    folic acid-vit B6-vit B12 (FOLBEE) 2.5-25-1 MG tablet tablet 1 tablet, Oral, Daily    Levonorgestrel (Mirena, 52 MG,) 20 MCG/DAY intrauterine device IUD 1 each, Intrauterine    levothyroxine (SYNTHROID) 25 mcg, Oral, Every Early Morning    metoclopramide (REGLAN) 10 mg, Oral, Every 6 Hours PRN    omeprazole (PRILOSEC) 20 mg, Daily    Scopolamine 1 MG/3DAYS patch 1 patch, Transdermal, Every 72 Hours    traZODone (DESYREL) 50 mg, Oral, Nightly PRN    ursodiol (ACTIGALL) 300 mg, Oral, 2 Times Daily       The following portions of the patient's history were reviewed and updated as appropriate: allergies, current medications, past family history, past medical history, past social history, past surgical history, and problem list.    Objective   Vital Signs:   /70 (BP Location: Left arm)   Pulse 75   Temp 97.5 °F (36.4 °C) (Temporal)   Ht 177.8 cm (70\")   Wt 130 kg (286 lb)   SpO2 98% "   BMI 41.04 kg/m²     BP Readings from Last 3 Encounters:   03/21/24 102/70   03/19/24 111/71   03/13/24 112/79     Wt Readings from Last 3 Encounters:   03/21/24 130 kg (286 lb)   03/19/24 130 kg (287 lb 4.2 oz)   03/13/24 132 kg (292 lb)         Physical Exam   Appearance: No acute distress, well-nourished  Head: normocephalic, atraumatic  Eyes: extraocular movements intact, no scleral icterus, no conjunctival injection  Ears, Nose, and Throat: external ears normal, nares patent, moist mucous membranes  Cardiovascular: regular rate and rhythm. no murmurs, rubs, or gallops. no edema  Respiratory: breathing comfortably, symmetric chest rise, clear to auscultation bilaterally. No wheezes, rales, or rhonchi.  Neuro: alert and oriented to time, place, and person. Normal gait  Psych: normal mood and affect     Result Review :   The following data was reviewed by: Kendrick Thompson Jr, MD on 03/21/2024:  Common labs          2/29/2024    06:50 3/5/2024    07:44 3/19/2024    16:10   Common Labs   Glucose 97  105  92    BUN 15  12  9    Creatinine 1.02  0.93  0.80    Sodium 140  138  140    Potassium 4.2  4.1  4.3    Chloride 105  109  105    Calcium 9.2  8.2  9.3    Albumin 4.1  3.6  4.3    Total Bilirubin 0.4  0.3  0.7    Alkaline Phosphatase 84  67  72    AST (SGOT) 10  22  14    ALT (SGPT) 7  22  10    WBC 5.18  7.78  4.50    Hemoglobin 11.9  10.7  12.9    Hematocrit 36.6  32.4  40.0    Platelets 226  221  227        Lab Results   Component Value Date    SARSANTIGEN Not Detected 08/10/2022    COVID19 Not Detected 08/10/2022    RAPFLUA Negative 08/08/2022    RAPFLUB Negative 08/08/2022    FLUAAG Not Detected 08/10/2022    FLUBAG Not Detected 08/10/2022    RAPSCRN Negative 08/10/2022    POCPREGUR Negative 03/04/2024    INR 0.95 (L) 01/16/2019    BILIRUBINUR Negative 03/19/2024          Assessment and Plan    Diagnoses and all orders for this visit:    1. Nausea and vomiting, unspecified vomiting type  (Primary)  Comments:  hope scopolamine helps and reduce use of reglan. hopefully pt can get back on her regular medications. suspect lexapro and wellbutrin have some effect on GI.  Orders:  -     Scopolamine 1 MG/3DAYS patch; Place 1 patch on the skin as directed by provider Every 72 (Seventy-Two) Hours.  Dispense: 30 each; Refill: 0          Medications Discontinued During This Encounter   Medication Reason    ondansetron (Zofran) 4 MG tablet *Therapy completed    ondansetron ODT (ZOFRAN-ODT) 4 MG disintegrating tablet *Therapy completed    Hyoscyamine Sulfate SL (Levsin/SL) 0.125 MG sublingual tablet *Therapy completed    Scopolamine 1 MG/3DAYS patch Reorder          Follow Up   Return if symptoms worsen or fail to improve.  Patient was given instructions and counseling regarding her condition or for health maintenance advice. Please see specific information pulled into the AVS if appropriate.       Kendrick Thompson Jr, MD  03/26/24  14:52 EDT

## 2024-03-21 ENCOUNTER — OFFICE VISIT (OUTPATIENT)
Dept: INTERNAL MEDICINE | Facility: CLINIC | Age: 29
End: 2024-03-21
Payer: COMMERCIAL

## 2024-03-21 VITALS
BODY MASS INDEX: 40.94 KG/M2 | HEART RATE: 75 BPM | OXYGEN SATURATION: 98 % | WEIGHT: 286 LBS | DIASTOLIC BLOOD PRESSURE: 70 MMHG | TEMPERATURE: 97.5 F | SYSTOLIC BLOOD PRESSURE: 102 MMHG | HEIGHT: 70 IN

## 2024-03-21 DIAGNOSIS — R11.2 NAUSEA AND VOMITING, UNSPECIFIED VOMITING TYPE: Primary | ICD-10-CM

## 2024-03-21 RX ORDER — SCOLOPAMINE TRANSDERMAL SYSTEM 1 MG/1
1 PATCH, EXTENDED RELEASE TRANSDERMAL
Qty: 30 EACH | Refills: 0 | Status: SHIPPED | OUTPATIENT
Start: 2024-03-21

## 2024-04-11 ENCOUNTER — PREP FOR SURGERY (OUTPATIENT)
Dept: OTHER | Facility: HOSPITAL | Age: 29
End: 2024-04-11
Payer: COMMERCIAL

## 2024-04-11 ENCOUNTER — OFFICE VISIT (OUTPATIENT)
Dept: BARIATRICS/WEIGHT MGMT | Facility: CLINIC | Age: 29
End: 2024-04-11
Payer: COMMERCIAL

## 2024-04-11 VITALS
BODY MASS INDEX: 41.62 KG/M2 | WEIGHT: 281 LBS | SYSTOLIC BLOOD PRESSURE: 107 MMHG | TEMPERATURE: 97.3 F | HEIGHT: 69 IN | HEART RATE: 69 BPM | DIASTOLIC BLOOD PRESSURE: 76 MMHG

## 2024-04-11 DIAGNOSIS — R11.2 NAUSEA AND VOMITING, UNSPECIFIED VOMITING TYPE: ICD-10-CM

## 2024-04-11 DIAGNOSIS — E86.0 DEHYDRATION: ICD-10-CM

## 2024-04-11 DIAGNOSIS — Z71.3 DIETARY COUNSELING: ICD-10-CM

## 2024-04-11 DIAGNOSIS — R13.19 OTHER DYSPHAGIA: ICD-10-CM

## 2024-04-11 DIAGNOSIS — Z98.84 S/P LAPAROSCOPIC SLEEVE GASTRECTOMY: ICD-10-CM

## 2024-04-11 DIAGNOSIS — E66.01 CLASS 3 SEVERE OBESITY WITH SERIOUS COMORBIDITY AND BODY MASS INDEX (BMI) OF 40.0 TO 44.9 IN ADULT, UNSPECIFIED OBESITY TYPE: Primary | ICD-10-CM

## 2024-04-11 DIAGNOSIS — R10.13 DYSPEPSIA: ICD-10-CM

## 2024-04-11 DIAGNOSIS — Z98.84 S/P LAPAROSCOPIC SLEEVE GASTRECTOMY: Primary | ICD-10-CM

## 2024-04-11 DIAGNOSIS — R13.10 DYSPHAGIA, UNSPECIFIED TYPE: ICD-10-CM

## 2024-04-11 PROCEDURE — 99024 POSTOP FOLLOW-UP VISIT: CPT | Performed by: NURSE PRACTITIONER

## 2024-04-11 RX ORDER — METOCLOPRAMIDE 10 MG/1
10 TABLET ORAL 2 TIMES DAILY PRN
Qty: 30 TABLET | Refills: 0 | Status: SHIPPED | OUTPATIENT
Start: 2024-04-11

## 2024-04-11 RX ORDER — SODIUM CHLORIDE, SODIUM LACTATE, POTASSIUM CHLORIDE, CALCIUM CHLORIDE 600; 310; 30; 20 MG/100ML; MG/100ML; MG/100ML; MG/100ML
30 INJECTION, SOLUTION INTRAVENOUS CONTINUOUS
OUTPATIENT
Start: 2024-04-11

## 2024-04-11 NOTE — H&P (VIEW-ONLY)
[facility] BARIATRIC SURGERY  [address]  [facility] [id number] [phone]  [address]  [facility] [address] [phone]  Dept: [phone]  4/11/2024      Purvi Foreman.  [id number]  [id number]  1995  female      Chief Complaint   Patient presents with    Post-op     1 month post op sleeve        BH Post-Op Bariatric Surgery:   Purvi Foreman is status post Laparoscopic Sleeve/HH procedure, performed on 3/4/2024     HPI:   Today's weight is 127 kg (281 lb) pounds, today's BMI is Body mass index is 40.92 kg/m²., has a  loss of 11 pounds since the last visit and weight loss since surgery is 31 pounds. The patient reports a decreased portion size and loss of appetite.      Purvi Foreman reports nausea with occasional vomiting.  Also experiencing some dysphagia.  Taking Reglan twice a day in the morning and at bedtime.  Sudden episodes of nausea.  Hasn't been able to tolerate vitamins.  Meds are not going down well.  Feels like getting stuck in throat.  Tolerating veggies and shredded or very soft chicken.  Soups give heartburn.  Crave sweets.  Protein bowls form meal prep place with oats and sometimes these are hard to get down as well.  Is getting around 50 grams of protein.  Protein shakes aren't sitting well.  Was tolerating premier and fairlife previously.  Gets protein bowls for dinner.  Can tolerate 2-3 bites of chicken and 2 bites of veggie.      Diet and Exercise: Diet history reviewed and discussed with the patient. Weight loss/gains to date discussed with the patient. The patient states they are eating unknown grams of protein per day. She reports eating 3 meals per day, a typical portion size of 1/2 cup, eating 3 snacks per day, drinking 5 or more 8-oz. glasses of water per day, no carbonated beverage consumption and exercising regularly.     Supplements: none.     Review of Systems   Constitutional:   Positive for activity change and appetite change.   Respiratory:  Negative for shortness of breath.    Cardiovascular:  Negative for chest pain.   Gastrointestinal:  Positive for nausea and vomiting.        Dysphagia   All other systems reviewed and are negative.      Patient Active Problem List   Diagnosis    Abnormal thyroid blood test    YUMI (generalized anxiety disorder)    Low libido    Chronic fatigue    Irregular menses    Encounter for other contraceptive management    Class 3 severe obesity with serious comorbidity and body mass index (BMI) of 40.0 to 44.9 in adult    Major depression, recurrent, full remission    Snoring    Dyspepsia    Dietary counseling    Chronic low back pain    Hiatal hernia    S/P laparoscopic sleeve gastrectomy with HHR    Nausea and vomiting    Dysphagia    Dehydration       Past Medical History:   Diagnosis Date    Anxiety     Depression     Disease of thyroid gland     Foot pain, bilateral     History of gestational hypertension     Hypertension     No meds    Migraine WITH aura     Nausea and vomiting 3/19/2024    Seasonal allergies        The following portions of the patient's history were reviewed and updated as appropriate: allergies, current medications, past medical history, past surgical history, and problem list.    Vitals:    04/11/24 0919   BP: 107/76   Pulse: 69   Temp: 97.3 °F (36.3 °C)       Physical Exam  Vitals reviewed.   Constitutional:       General: She is not in acute distress.     Appearance: Normal appearance. She is morbidly obese.   HENT:      Head: Normocephalic and atraumatic.      Mouth/Throat:      Mouth: Mucous membranes are moist.      Pharynx: Oropharynx is clear.   Eyes:      General: No scleral icterus.     Extraocular Movements: Extraocular movements intact.      Conjunctiva/sclera: Conjunctivae normal.      Pupils: Pupils are equal, round, and reactive to light.   Cardiovascular:      Rate and Rhythm: Normal rate and regular rhythm.    Pulmonary:      Effort: Pulmonary effort is normal. No respiratory distress.   Abdominal:      General: Bowel sounds are normal.      Palpations: Abdomen is soft.   Musculoskeletal:         General: Normal range of motion.      Cervical back: Normal range of motion and neck supple.   Skin:     General: Skin is warm and dry.   Neurological:      General: No focal deficit present.      Mental Status: She is alert and oriented to person, place, and time.   Psychiatric:         Mood and Affect: Mood normal.         Behavior: Behavior normal.         Thought Content: Thought content normal.         Judgment: Judgment normal.         Assessment:   Post-op, the patient is struggling with constant nausea requiring Reglan and Scopolamine.  Feels like was able to tolerate a little more week 3 than she can now.  Struggling getting water in at this point because she is having to snack so frequently during the day that she can't drink.  Went to ED on 3/19/24 for nausea.  Was found to be dehydrated with UTI.  Treated with antibiotics, Emend, and fluids.     Encounter Diagnoses   Name Primary?    Class 3 severe obesity with serious comorbidity and body mass index (BMI) of 40.0 to 44.9 in adult, unspecified obesity type Yes    S/P laparoscopic sleeve gastrectomy with HHR     Dyspepsia     Other dysphagia     Nausea and vomiting, unspecified vomiting type     Dietary counseling        Plan:   Will schedule EGD  Her goal weight is 199#.  12 month goal 233#  Reglan bid refilled.  Discussed potential SE and do not want to stay on this medication long term.    Call if no improvement after EGD.    Will get labs at next visit  If improvement after EGD make sure to increase water and protein intake.  Discussed typical serving sizes at 1 m/3 m/6 m.  Encouraged patient to be sure to get plenty of lean protein per day through small frequent meals all with a protein source.   Activity restrictions: none.   Recommended patient be sure to get  at least 70 grams of protein per day by eating small, frequent meals all with high lean protein choices. Be sure to limit/cut back on daily carbohydrate intake. Discussed with the patient the recommended amount of water per day to intake- half of body weight in ounces. Reviewed vitamin requirements. Be sure to do routine exercise, 150 minutes per week minimum, including both cardio and strength training.     Instructions / Recommendations: dietary counseling recommended, recommended a daily protein intake of  grams, vitamin supplement(s) recommended, recommended exercising at least 150 minutes per week, behavior modifications recommended and instructed to call the office for concerns, questions, or problems.     The patient was instructed to follow up in 2 months.     Total time spent during this encounter today was 25 minutes

## 2024-04-11 NOTE — PROGRESS NOTES
[facility] BARIATRIC SURGERY  [address]  [facility] [id number] [phone]  [address]  [facility] [address] [phone]  Dept: [phone]  4/11/2024      Purvi Foreman.  [id number]  [id number]  1995  female      Chief Complaint   Patient presents with    Post-op     1 month post op sleeve        BH Post-Op Bariatric Surgery:   Purvi Foreman is status post Laparoscopic Sleeve/HH procedure, performed on 3/4/2024     HPI:   Today's weight is 127 kg (281 lb) pounds, today's BMI is Body mass index is 40.92 kg/m²., has a  loss of 11 pounds since the last visit and weight loss since surgery is 31 pounds. The patient reports a decreased portion size and loss of appetite.      Purvi Foreman reports nausea with occasional vomiting.  Also experiencing some dysphagia.  Taking Reglan twice a day in the morning and at bedtime.  Sudden episodes of nausea.  Hasn't been able to tolerate vitamins.  Meds are not going down well.  Feels like getting stuck in throat.  Tolerating veggies and shredded or very soft chicken.  Soups give heartburn.  Crave sweets.  Protein bowls form meal prep place with oats and sometimes these are hard to get down as well.  Is getting around 50 grams of protein.  Protein shakes aren't sitting well.  Was tolerating premier and fairlife previously.  Gets protein bowls for dinner.  Can tolerate 2-3 bites of chicken and 2 bites of veggie.      Diet and Exercise: Diet history reviewed and discussed with the patient. Weight loss/gains to date discussed with the patient. The patient states they are eating unknown grams of protein per day. She reports eating 3 meals per day, a typical portion size of 1/2 cup, eating 3 snacks per day, drinking 5 or more 8-oz. glasses of water per day, no carbonated beverage consumption and exercising regularly.     Supplements: none.     Review of Systems   Constitutional:   Positive for activity change and appetite change.   Respiratory:  Negative for shortness of breath.    Cardiovascular:  Negative for chest pain.   Gastrointestinal:  Positive for nausea and vomiting.        Dysphagia   All other systems reviewed and are negative.      Patient Active Problem List   Diagnosis    Abnormal thyroid blood test    YUMI (generalized anxiety disorder)    Low libido    Chronic fatigue    Irregular menses    Encounter for other contraceptive management    Class 3 severe obesity with serious comorbidity and body mass index (BMI) of 40.0 to 44.9 in adult    Major depression, recurrent, full remission    Snoring    Dyspepsia    Dietary counseling    Chronic low back pain    Hiatal hernia    S/P laparoscopic sleeve gastrectomy with HHR    Nausea and vomiting    Dysphagia    Dehydration       Past Medical History:   Diagnosis Date    Anxiety     Depression     Disease of thyroid gland     Foot pain, bilateral     History of gestational hypertension     Hypertension     No meds    Migraine WITH aura     Nausea and vomiting 3/19/2024    Seasonal allergies        The following portions of the patient's history were reviewed and updated as appropriate: allergies, current medications, past medical history, past surgical history, and problem list.    Vitals:    04/11/24 0919   BP: 107/76   Pulse: 69   Temp: 97.3 °F (36.3 °C)       Physical Exam  Vitals reviewed.   Constitutional:       General: She is not in acute distress.     Appearance: Normal appearance. She is morbidly obese.   HENT:      Head: Normocephalic and atraumatic.      Mouth/Throat:      Mouth: Mucous membranes are moist.      Pharynx: Oropharynx is clear.   Eyes:      General: No scleral icterus.     Extraocular Movements: Extraocular movements intact.      Conjunctiva/sclera: Conjunctivae normal.      Pupils: Pupils are equal, round, and reactive to light.   Cardiovascular:      Rate and Rhythm: Normal rate and regular rhythm.    Pulmonary:      Effort: Pulmonary effort is normal. No respiratory distress.   Abdominal:      General: Bowel sounds are normal.      Palpations: Abdomen is soft.   Musculoskeletal:         General: Normal range of motion.      Cervical back: Normal range of motion and neck supple.   Skin:     General: Skin is warm and dry.   Neurological:      General: No focal deficit present.      Mental Status: She is alert and oriented to person, place, and time.   Psychiatric:         Mood and Affect: Mood normal.         Behavior: Behavior normal.         Thought Content: Thought content normal.         Judgment: Judgment normal.         Assessment:   Post-op, the patient is struggling with constant nausea requiring Reglan and Scopolamine.  Feels like was able to tolerate a little more week 3 than she can now.  Struggling getting water in at this point because she is having to snack so frequently during the day that she can't drink.  Went to ED on 3/19/24 for nausea.  Was found to be dehydrated with UTI.  Treated with antibiotics, Emend, and fluids.     Encounter Diagnoses   Name Primary?    Class 3 severe obesity with serious comorbidity and body mass index (BMI) of 40.0 to 44.9 in adult, unspecified obesity type Yes    S/P laparoscopic sleeve gastrectomy with HHR     Dyspepsia     Other dysphagia     Nausea and vomiting, unspecified vomiting type     Dietary counseling        Plan:   Will schedule EGD  Her goal weight is 199#.  12 month goal 233#  Reglan bid refilled.  Discussed potential SE and do not want to stay on this medication long term.    Call if no improvement after EGD.    Will get labs at next visit  If improvement after EGD make sure to increase water and protein intake.  Discussed typical serving sizes at 1 m/3 m/6 m.  Encouraged patient to be sure to get plenty of lean protein per day through small frequent meals all with a protein source.   Activity restrictions: none.   Recommended patient be sure to get  at least 70 grams of protein per day by eating small, frequent meals all with high lean protein choices. Be sure to limit/cut back on daily carbohydrate intake. Discussed with the patient the recommended amount of water per day to intake- half of body weight in ounces. Reviewed vitamin requirements. Be sure to do routine exercise, 150 minutes per week minimum, including both cardio and strength training.     Instructions / Recommendations: dietary counseling recommended, recommended a daily protein intake of  grams, vitamin supplement(s) recommended, recommended exercising at least 150 minutes per week, behavior modifications recommended and instructed to call the office for concerns, questions, or problems.     The patient was instructed to follow up in 2 months.     Total time spent during this encounter today was 25 minutes

## 2024-04-18 RX ORDER — MULTIVIT WITH IRON,MINERALS
2 TABLET,CHEWABLE ORAL DAILY
COMMUNITY

## 2024-04-19 ENCOUNTER — HOSPITAL ENCOUNTER (OUTPATIENT)
Facility: HOSPITAL | Age: 29
Setting detail: HOSPITAL OUTPATIENT SURGERY
Discharge: HOME | End: 2024-04-19
Attending: SURGERY | Admitting: SURGERY
Payer: COMMERCIAL

## 2024-04-19 ENCOUNTER — ANESTHESIA (OUTPATIENT)
Dept: GASTROENTEROLOGY | Facility: HOSPITAL | Age: 29
End: 2024-04-19
Payer: COMMERCIAL

## 2024-04-19 ENCOUNTER — ANESTHESIA EVENT (OUTPATIENT)
Dept: GASTROENTEROLOGY | Facility: HOSPITAL | Age: 29
End: 2024-04-19
Payer: COMMERCIAL

## 2024-04-19 VITALS
RESPIRATION RATE: 18 BRPM | WEIGHT: 278.8 LBS | BODY MASS INDEX: 39.03 KG/M2 | SYSTOLIC BLOOD PRESSURE: 115 MMHG | HEIGHT: 71 IN | DIASTOLIC BLOOD PRESSURE: 74 MMHG | HEART RATE: 96 BPM | OXYGEN SATURATION: 99 %

## 2024-04-19 DIAGNOSIS — Z98.84 S/P LAPAROSCOPIC SLEEVE GASTRECTOMY: ICD-10-CM

## 2024-04-19 DIAGNOSIS — R13.10 DYSPHAGIA, UNSPECIFIED TYPE: ICD-10-CM

## 2024-04-19 DIAGNOSIS — R11.2 NAUSEA AND VOMITING, UNSPECIFIED VOMITING TYPE: ICD-10-CM

## 2024-04-19 DIAGNOSIS — E86.0 DEHYDRATION: ICD-10-CM

## 2024-04-19 PROCEDURE — 25810000003 LACTATED RINGERS PER 1000 ML: Performed by: NURSE PRACTITIONER

## 2024-04-19 PROCEDURE — 25010000002 PROPOFOL 10 MG/ML EMULSION: Performed by: NURSE ANESTHETIST, CERTIFIED REGISTERED

## 2024-04-19 PROCEDURE — 25010000002 GLYCOPYRROLATE 0.2 MG/ML SOLUTION: Performed by: NURSE ANESTHETIST, CERTIFIED REGISTERED

## 2024-04-19 PROCEDURE — 43245 EGD DILATE STRICTURE: CPT | Performed by: SURGERY

## 2024-04-19 PROCEDURE — C1726 CATH, BAL DIL, NON-VASCULAR: HCPCS | Performed by: SURGERY

## 2024-04-19 PROCEDURE — 81025 URINE PREGNANCY TEST: CPT | Performed by: SURGERY

## 2024-04-19 RX ORDER — LIDOCAINE HYDROCHLORIDE 20 MG/ML
INJECTION, SOLUTION INFILTRATION; PERINEURAL AS NEEDED
Status: DISCONTINUED | OUTPATIENT
Start: 2024-04-19 | End: 2024-04-19 | Stop reason: SURG

## 2024-04-19 RX ORDER — PROPOFOL 10 MG/ML
VIAL (ML) INTRAVENOUS AS NEEDED
Status: DISCONTINUED | OUTPATIENT
Start: 2024-04-19 | End: 2024-04-19 | Stop reason: SURG

## 2024-04-19 RX ORDER — SODIUM CHLORIDE, SODIUM LACTATE, POTASSIUM CHLORIDE, CALCIUM CHLORIDE 600; 310; 30; 20 MG/100ML; MG/100ML; MG/100ML; MG/100ML
30 INJECTION, SOLUTION INTRAVENOUS CONTINUOUS
Status: DISCONTINUED | OUTPATIENT
Start: 2024-04-19 | End: 2024-04-19 | Stop reason: HOSPADM

## 2024-04-19 RX ORDER — GLYCOPYRROLATE 0.2 MG/ML
INJECTION INTRAMUSCULAR; INTRAVENOUS AS NEEDED
Status: DISCONTINUED | OUTPATIENT
Start: 2024-04-19 | End: 2024-04-19 | Stop reason: SURG

## 2024-04-19 RX ADMIN — LIDOCAINE HYDROCHLORIDE 60 MG: 20 INJECTION, SOLUTION INFILTRATION; PERINEURAL at 07:37

## 2024-04-19 RX ADMIN — PROPOFOL 100 MG: 10 INJECTION, EMULSION INTRAVENOUS at 07:37

## 2024-04-19 RX ADMIN — GLYCOPYRROLATE 0.2 MG: 0.2 INJECTION INTRAMUSCULAR; INTRAVENOUS at 07:31

## 2024-04-19 RX ADMIN — SODIUM CHLORIDE, POTASSIUM CHLORIDE, SODIUM LACTATE AND CALCIUM CHLORIDE: 600; 310; 30; 20 INJECTION, SOLUTION INTRAVENOUS at 07:29

## 2024-04-19 NOTE — OP NOTE
Surgeon: Josue Moore Jr., M.D.    Preoperative Diagnosis: Dysphagia with history of gastric sleeve and hiatal hernia repair    Postoperative Diagnosis: Same    Procedure Performed: Transoral esophagogastroduodenoscopy with 18-20 balloon dilatation of pylorus    Indications: 29-year-old female status post sleeve gastrectomy and hiatal hernia repair with complaints of dysphagia.    Procedure:     The procedure, indications, preparation and potential, patient were explained to the patient, who indicated understanding and signed the corresponding consent forms.  The patient was identified, taken to the endoscopy suite, and placed on the left side down decubitus position.  The patient underwent a MAC anesthesia and was appropriately monitored through the case by the anesthesia personnel using continuous pulse oximetry, blood pressure, and cardiac monitoring.  A bite block was placed.  After adequate IV sedation and using a transoral technique a lubed flexible endoscope was placed in the hypopharynx and advanced to the second portion of the duodenum.  The pylorus was mildly strictured and the scope had to be advanced with some pressure into the duodenum.  The scope was then withdrawn back into the gastric sleeve.  There was no stricture noted in the gastric sleeve unless dictated below.  No polyps or ulcers were seen unless dictated below.  The scope was then withdrawn back into the esophagus.  The Z line was regular and no erosive esophagitis seen unless dictated below.  Scope was then advanced back down into the antrum.  A 18-20 balloon catheter was then advanced across the pylorus and taken up in sequence and each level held for approximately 1 minute.  The catheter was deflated and removed.  The pylorus dilated up nicely.  The scope was then completely withdrawn after decompressing the stomach.  The patient tolerated the procedure well and left the endoscopy suite in stable condition.    The sleeve was of normal  size without stricture or dilatation.  The pylorus opened up nicely after dilatation.  Z-line was regular no erosive esophagitis noted.  The balloon was kept inflated and brought across the GE junction into the esophagus.    Recommendations:     Continue with current management and follow-up in the office.

## 2024-04-19 NOTE — ANESTHESIA PREPROCEDURE EVALUATION
Anesthesia Evaluation     Patient summary reviewed and Nursing notes reviewed   history of anesthetic complications:  PONV  NPO Solid Status: > 6 hours  NPO Liquid Status: > 2 hours           Airway   Mallampati: III  TM distance: >3 FB  Neck ROM: full  Anterior  Dental - normal exam     Pulmonary    (-) COPD, asthma, not a smoker  Cardiovascular   Exercise tolerance: good (4-7 METS)    (-) past MI, dysrhythmias, angina      Neuro/Psych  (+) headaches, psychiatric history Anxiety and Depression  (-) seizures, CVA  GI/Hepatic/Renal/Endo    (+) morbid obesity, hiatal hernia, thyroid problem hypothyroidism  (-) GERD, liver disease, no renal disease, diabetes    Musculoskeletal     (-) radiculopathy  Abdominal    Substance History      OB/GYN          Other                        Anesthesia Plan    ASA 3     MAC     (Las intubation, grade 1 view with MAC 3)    Anesthetic plan, risks, benefits, and alternatives have been provided, discussed and informed consent has been obtained with: patient.      CODE STATUS:

## 2024-04-19 NOTE — ANESTHESIA POSTPROCEDURE EVALUATION
"Patient: Purvi Foreman    Procedure Summary       Date: 04/19/24 Room / Location:  [facility] /  [facility]    Anesthesia Start: 0729 Anesthesia Stop: 0753    Procedure: ESOPHAGOGASTRODUODENOSCOPY WITH BALLOON DILITATION SIZE 18-20 (Esophagus) Diagnosis:       S/P laparoscopic sleeve gastrectomy      Nausea and vomiting, unspecified vomiting type      Dysphagia, unspecified type      Dehydration      (S/P laparoscopic sleeve gastrectomy [Z98.84])      (Nausea and vomiting, unspecified vomiting type [R11.2])      (Dysphagia, unspecified type [R13.10])      (Dehydration [E86.0])    Surgeons: Josue Moore Jr., MD Provider: Trell Carranza MD    Anesthesia Type: MAC ASA Status: 3            Anesthesia Type: MAC    Vitals  Vitals Value Taken Time   /62 04/19/24 0801   Temp     Pulse 91 04/19/24 0802   Resp 18 04/19/24 0749   SpO2 97 % 04/19/24 0802   Vitals shown include unfiled device data.        Post Anesthesia Care and Evaluation    Patient location during evaluation: PACU  Patient participation: complete - patient participated  Level of consciousness: awake and alert  Pain management: adequate    Airway patency: patent  Anesthetic complications: No anesthetic complications    Cardiovascular status: acceptable  Respiratory status: acceptable  Hydration status: acceptable    Comments: /78 (BP Location: Right arm, Patient Position: Sitting)   Pulse 96   Resp 18   Ht 180.3 cm (71\")   Wt 126 kg (278 lb 12.8 oz)   SpO2 96%   BMI 38.88 kg/m²       "

## 2024-05-03 ENCOUNTER — PATIENT MESSAGE (OUTPATIENT)
Dept: INTERNAL MEDICINE | Facility: CLINIC | Age: 29
End: 2024-05-03
Payer: COMMERCIAL

## 2024-05-03 RX ORDER — BROMPHENIRAMINE MALEATE, PSEUDOEPHEDRINE HYDROCHLORIDE, AND DEXTROMETHORPHAN HYDROBROMIDE 2; 30; 10 MG/5ML; MG/5ML; MG/5ML
5 SYRUP ORAL 4 TIMES DAILY PRN
Qty: 118 ML | Refills: 0 | Status: SHIPPED | OUTPATIENT
Start: 2024-05-03

## 2024-05-03 NOTE — TELEPHONE ENCOUNTER
From: Purvi Foreman  To: Kendrick Thompson  Sent: 5/3/2024 7:22 AM EDT  Subject: Sinus Infection    Good morning! Since Tuesday, I have had nasty congestion, runny nose and a cough. I thought it was allergies and I was doctoring myself by Benadryl at bedtime and Claritin in the mornings and it’s now turned into a sinus infection. Is this something you could call me in a prescription or do I have to be seen? If I need to be seen, is there availability today?     Thank you!

## 2024-06-25 ENCOUNTER — OFFICE VISIT (OUTPATIENT)
Dept: BARIATRICS/WEIGHT MGMT | Facility: CLINIC | Age: 29
End: 2024-06-25
Payer: COMMERCIAL

## 2024-06-25 VITALS
HEART RATE: 87 BPM | BODY MASS INDEX: 38.8 KG/M2 | TEMPERATURE: 97.8 F | HEIGHT: 69 IN | DIASTOLIC BLOOD PRESSURE: 76 MMHG | SYSTOLIC BLOOD PRESSURE: 120 MMHG | WEIGHT: 262 LBS

## 2024-06-25 DIAGNOSIS — N92.6 IRREGULAR MENSES: ICD-10-CM

## 2024-06-25 DIAGNOSIS — M54.50 CHRONIC BILATERAL LOW BACK PAIN WITHOUT SCIATICA: ICD-10-CM

## 2024-06-25 DIAGNOSIS — Z98.84 S/P LAPAROSCOPIC SLEEVE GASTRECTOMY: ICD-10-CM

## 2024-06-25 DIAGNOSIS — Z71.3 DIETARY COUNSELING: ICD-10-CM

## 2024-06-25 DIAGNOSIS — R53.82 CHRONIC FATIGUE: ICD-10-CM

## 2024-06-25 DIAGNOSIS — G89.29 CHRONIC BILATERAL LOW BACK PAIN WITHOUT SCIATICA: ICD-10-CM

## 2024-06-25 DIAGNOSIS — E66.9 OBESITY, CLASS II, BMI 35-39.9: Primary | ICD-10-CM

## 2024-06-25 DIAGNOSIS — E66.9 OBESITY, CLASS II, BMI 35-39.9: ICD-10-CM

## 2024-06-25 PROBLEM — E66.812 OBESITY, CLASS II, BMI 35-39.9: Status: ACTIVE | Noted: 2024-06-25

## 2024-06-25 LAB
25(OH)D3 SERPL-MCNC: 24 NG/ML (ref 30–100)
ALBUMIN SERPL-MCNC: 4.4 G/DL (ref 3.5–5.2)
ALBUMIN/GLOB SERPL: 1.6 G/DL
ALP SERPL-CCNC: 85 U/L (ref 39–117)
ALT SERPL W P-5'-P-CCNC: 10 U/L (ref 1–33)
ANION GAP SERPL CALCULATED.3IONS-SCNC: 10.9 MMOL/L (ref 5–15)
AST SERPL-CCNC: 16 U/L (ref 1–32)
BASOPHILS # BLD AUTO: 0.04 10*3/MM3 (ref 0–0.2)
BASOPHILS NFR BLD AUTO: 0.6 % (ref 0–1.5)
BILIRUB SERPL-MCNC: 0.7 MG/DL (ref 0–1.2)
BUN SERPL-MCNC: 10 MG/DL (ref 6–20)
BUN/CREAT SERPL: 10.5 (ref 7–25)
CALCIUM SPEC-SCNC: 9.7 MG/DL (ref 8.6–10.5)
CHLORIDE SERPL-SCNC: 103 MMOL/L (ref 98–107)
CO2 SERPL-SCNC: 24.1 MMOL/L (ref 22–29)
CREAT SERPL-MCNC: 0.95 MG/DL (ref 0.57–1)
DEPRECATED RDW RBC AUTO: 40.2 FL (ref 37–54)
EGFRCR SERPLBLD CKD-EPI 2021: 82.8 ML/MIN/1.73
EOSINOPHIL # BLD AUTO: 0.15 10*3/MM3 (ref 0–0.4)
EOSINOPHIL NFR BLD AUTO: 2.4 % (ref 0.3–6.2)
ERYTHROCYTE [DISTWIDTH] IN BLOOD BY AUTOMATED COUNT: 13 % (ref 12.3–15.4)
FERRITIN SERPL-MCNC: 66.9 NG/ML (ref 13–150)
FOLATE SERPL-MCNC: 4.11 NG/ML (ref 4.78–24.2)
GLOBULIN UR ELPH-MCNC: 2.8 GM/DL
GLUCOSE SERPL-MCNC: 83 MG/DL (ref 65–99)
HCT VFR BLD AUTO: 41.6 % (ref 34–46.6)
HGB BLD-MCNC: 13.5 G/DL (ref 12–15.9)
IMM GRANULOCYTES # BLD AUTO: 0.01 10*3/MM3 (ref 0–0.05)
IMM GRANULOCYTES NFR BLD AUTO: 0.2 % (ref 0–0.5)
IRON 24H UR-MRATE: 60 MCG/DL (ref 37–145)
LYMPHOCYTES # BLD AUTO: 1.76 10*3/MM3 (ref 0.7–3.1)
LYMPHOCYTES NFR BLD AUTO: 28.4 % (ref 19.6–45.3)
MCH RBC QN AUTO: 27.8 PG (ref 26.6–33)
MCHC RBC AUTO-ENTMCNC: 32.5 G/DL (ref 31.5–35.7)
MCV RBC AUTO: 85.8 FL (ref 79–97)
MONOCYTES # BLD AUTO: 0.25 10*3/MM3 (ref 0.1–0.9)
MONOCYTES NFR BLD AUTO: 4 % (ref 5–12)
NEUTROPHILS NFR BLD AUTO: 3.99 10*3/MM3 (ref 1.7–7)
NEUTROPHILS NFR BLD AUTO: 64.4 % (ref 42.7–76)
NRBC BLD AUTO-RTO: 0 /100 WBC (ref 0–0.2)
PLATELET # BLD AUTO: 255 10*3/MM3 (ref 140–450)
PMV BLD AUTO: 11.6 FL (ref 6–12)
POTASSIUM SERPL-SCNC: 3.8 MMOL/L (ref 3.5–5.2)
PREALB SERPL-MCNC: 14.8 MG/DL (ref 20–40)
PROT SERPL-MCNC: 7.2 G/DL (ref 6–8.5)
RBC # BLD AUTO: 4.85 10*6/MM3 (ref 3.77–5.28)
SODIUM SERPL-SCNC: 138 MMOL/L (ref 136–145)
WBC NRBC COR # BLD AUTO: 6.2 10*3/MM3 (ref 3.4–10.8)

## 2024-06-25 PROCEDURE — 99213 OFFICE O/P EST LOW 20 MIN: CPT | Performed by: NURSE PRACTITIONER

## 2024-06-25 PROCEDURE — 83540 ASSAY OF IRON: CPT | Performed by: NURSE PRACTITIONER

## 2024-06-25 PROCEDURE — 82728 ASSAY OF FERRITIN: CPT | Performed by: NURSE PRACTITIONER

## 2024-06-25 PROCEDURE — 84425 ASSAY OF VITAMIN B-1: CPT | Performed by: NURSE PRACTITIONER

## 2024-06-25 PROCEDURE — 83921 ORGANIC ACID SINGLE QUANT: CPT | Performed by: NURSE PRACTITIONER

## 2024-06-25 PROCEDURE — 82746 ASSAY OF FOLIC ACID SERUM: CPT | Performed by: NURSE PRACTITIONER

## 2024-06-25 PROCEDURE — 82306 VITAMIN D 25 HYDROXY: CPT | Performed by: NURSE PRACTITIONER

## 2024-06-25 PROCEDURE — 80053 COMPREHEN METABOLIC PANEL: CPT | Performed by: NURSE PRACTITIONER

## 2024-06-25 PROCEDURE — 84134 ASSAY OF PREALBUMIN: CPT | Performed by: NURSE PRACTITIONER

## 2024-06-25 PROCEDURE — 85025 COMPLETE CBC W/AUTO DIFF WBC: CPT | Performed by: NURSE PRACTITIONER

## 2024-06-25 NOTE — PROGRESS NOTES
[facility] BARIATRIC [facility] BARIATRIC SURGERY  [address]  [facility] [id number] [phone]  [address]  [facility] [id number] [phone]  Dept: [phone]  6/25/2024      Purvi Foreman.  [id number]  [id number]  1995  female      Chief Complaint   Patient presents with    Follow-up     3 month follow up sleeve        BH Post-Op Bariatric Surgery:   Purvi Foreman is status post Laparoscopic Sleeve/HH procedure, performed on 3/4/2024     HPI:   Today's weight is 119 kg (262 lb) pounds, today's BMI is Body mass index is 38.15 kg/m²., has a  loss of 19 pounds since the last visit and weight loss since surgery is 50 pounds. The patient reports a decreased portion size and loss of appetite.      Purvi Foreman denies nausea, vomiting, reflux, dysphagia and reports tolerating regular diet.  Feels much better after EGD with dilation.  Drinking at least 1 protein shake per day.  Getting 5 tere's worth of water down per day.  Has to make herself eat.  Not really hungry.  Eating meat at dinner.  Serving size of chickien is 4 oz.  Off all medications.  Not seeing psychiatrist anymore.       Diet and Exercise: Diet history reviewed and discussed with the patient. Weight loss/gains to date discussed with the patient. The patient states they are eating 60 grams of protein per day. She reports eating 3 meals per day, a typical portion size of 1 cup, eating 2 snacks per day, drinking 5+ or more 8-oz. glasses of water per day, no carbonated beverage consumption and exercising regularly.     Supplements: flintstones x2.     Review of Systems   Constitutional:  Positive for activity change and appetite change.   Respiratory:  Negative for shortness of breath.    Cardiovascular:  Negative for chest pain.   All other systems reviewed and are negative.      Patient Active Problem List   Diagnosis    Abnormal thyroid blood test    YUMI  (generalized anxiety disorder)    Low libido    Chronic fatigue    Irregular menses    Encounter for other contraceptive management    Class 3 severe obesity with serious comorbidity and body mass index (BMI) of 40.0 to 44.9 in adult    Major depression, recurrent, full remission    Snoring    Dyspepsia    Dietary counseling    Chronic low back pain    Hiatal hernia    S/P laparoscopic sleeve gastrectomy with HHR    Nausea and vomiting    Dysphagia    Dehydration    Obesity, Class II, BMI 35-39.9       Past Medical History:   Diagnosis Date    Anxiety     Depression     Disease of thyroid gland     Dysphagia     Foot pain, bilateral     History of gestational hypertension     Hypertension     No meds    Migraine WITH aura     PONV (postoperative nausea and vomiting)     Seasonal allergies        The following portions of the patient's history were reviewed and updated as appropriate: allergies, current medications, past medical history, past surgical history, and problem list.    Vitals:    06/25/24 0853   BP: 120/76   Pulse: 87   Temp: 97.8 °F (36.6 °C)       Physical Exam  Vitals reviewed.   Constitutional:       General: She is not in acute distress.     Appearance: Normal appearance. She is obese.   HENT:      Head: Normocephalic and atraumatic.      Mouth/Throat:      Mouth: Mucous membranes are moist.      Pharynx: Oropharynx is clear.   Eyes:      General: No scleral icterus.     Extraocular Movements: Extraocular movements intact.      Conjunctiva/sclera: Conjunctivae normal.      Pupils: Pupils are equal, round, and reactive to light.   Cardiovascular:      Rate and Rhythm: Normal rate and regular rhythm.   Pulmonary:      Effort: Pulmonary effort is normal. No respiratory distress.   Abdominal:      General: Bowel sounds are normal.      Palpations: Abdomen is soft.   Musculoskeletal:         General: Normal range of motion.      Cervical back: Normal range of motion and neck supple.   Skin:     General:  Skin is warm and dry.   Neurological:      General: No focal deficit present.      Mental Status: She is alert and oriented to person, place, and time.   Psychiatric:         Mood and Affect: Mood normal.         Behavior: Behavior normal.         Thought Content: Thought content normal.         Judgment: Judgment normal.         Assessment:   Post-op, the patient is doing well.     Encounter Diagnoses   Name Primary?    Obesity, Class II, BMI 35-39.9 Yes    S/P laparoscopic sleeve gastrectomy with HHR     Chronic bilateral low back pain without sciatica     Dietary counseling     Irregular menses     Chronic fatigue        Plan:   Will get labs  Her goal weight is 199#. 12 month goal 233#  Encouraged patient to be sure to get plenty of lean protein per day through small frequent meals all with a protein source.   Activity restrictions: none.   Recommended patient be sure to get at least 70 grams of protein per day by eating small, frequent meals all with high lean protein choices. Be sure to limit/cut back on daily carbohydrate intake. Discussed with the patient the recommended amount of water per day to intake- half of body weight in ounces. Reviewed vitamin requirements. Be sure to do routine exercise, 150 minutes per week minimum, including both cardio and strength training.     Instructions / Recommendations: dietary counseling recommended, recommended a daily protein intake of  grams, vitamin supplement(s) recommended, recommended exercising at least 150 minutes per week, behavior modifications recommended and instructed to call the office for concerns, questions, or problems.     The patient was instructed to follow up in 3 months.     Total time spent during this encounter today was 25 minutes

## 2024-06-26 RX ORDER — ERGOCALCIFEROL 1.25 MG/1
50000 CAPSULE ORAL
Qty: 12 CAPSULE | Refills: 0 | Status: SHIPPED | OUTPATIENT
Start: 2024-06-26 | End: 2024-09-12

## 2024-07-01 LAB
METHYLMALONATE SERPL-SCNC: 148 NMOL/L (ref 0–378)
VIT B1 BLD-SCNC: 99.6 NMOL/L (ref 66.5–200)

## 2024-07-22 ENCOUNTER — OFFICE VISIT (OUTPATIENT)
Dept: INTERNAL MEDICINE | Facility: CLINIC | Age: 29
End: 2024-07-22
Payer: COMMERCIAL

## 2024-07-22 VITALS
TEMPERATURE: 98.9 F | HEIGHT: 69 IN | OXYGEN SATURATION: 98 % | BODY MASS INDEX: 37.47 KG/M2 | WEIGHT: 253 LBS | SYSTOLIC BLOOD PRESSURE: 112 MMHG | HEART RATE: 97 BPM | DIASTOLIC BLOOD PRESSURE: 76 MMHG

## 2024-07-22 DIAGNOSIS — R13.19 OTHER DYSPHAGIA: ICD-10-CM

## 2024-07-22 DIAGNOSIS — E66.01 CLASS 3 SEVERE OBESITY WITH SERIOUS COMORBIDITY AND BODY MASS INDEX (BMI) OF 40.0 TO 44.9 IN ADULT, UNSPECIFIED OBESITY TYPE: ICD-10-CM

## 2024-07-22 DIAGNOSIS — R11.2 NAUSEA AND VOMITING, UNSPECIFIED VOMITING TYPE: ICD-10-CM

## 2024-07-22 DIAGNOSIS — F41.1 GAD (GENERALIZED ANXIETY DISORDER): Primary | ICD-10-CM

## 2024-07-22 DIAGNOSIS — Z79.899 ENCOUNTER FOR LONG-TERM (CURRENT) USE OF OTHER MEDICATIONS: ICD-10-CM

## 2024-07-22 LAB
AMPHET+METHAMPHET UR QL: NEGATIVE
AMPHETAMINE INTERNAL CONTROL: NORMAL
AMPHETAMINES UR QL: NEGATIVE
BARBITURATE INTERNAL CONTROL: NORMAL
BARBITURATES UR QL SCN: NEGATIVE
BENZODIAZ UR QL SCN: NEGATIVE
BENZODIAZEPINE INTERNAL CONTROL: NORMAL
BUPRENORPHINE INTERNAL CONTROL: NORMAL
BUPRENORPHINE SERPL-MCNC: NEGATIVE NG/ML
CANNABINOIDS SERPL QL: NEGATIVE
COCAINE INTERNAL CONTROL: NORMAL
COCAINE UR QL: NEGATIVE
EXPIRATION DATE: NORMAL
Lab: NORMAL
MDMA (ECSTASY) INTERNAL CONTROL: NORMAL
MDMA UR QL SCN: NEGATIVE
METHADONE INTERNAL CONTROL: NORMAL
METHADONE UR QL SCN: NEGATIVE
METHAMPHETAMINE INTERNAL CONTROL: NORMAL
MORPHINE INTERNAL CONTROL: NORMAL
MORPHINE/OPIATES SCREEN, URINE: NEGATIVE
OXYCODONE INTERNAL CONTROL: NORMAL
OXYCODONE UR QL SCN: NEGATIVE
PCP UR QL SCN: NEGATIVE
PHENCYCLIDINE INTERNAL CONTROL: NORMAL
THC INTERNAL CONTROL: NORMAL

## 2024-07-22 PROCEDURE — 99214 OFFICE O/P EST MOD 30 MIN: CPT | Performed by: INTERNAL MEDICINE

## 2024-07-22 PROCEDURE — 80305 DRUG TEST PRSMV DIR OPT OBS: CPT | Performed by: INTERNAL MEDICINE

## 2024-07-22 RX ORDER — DEXTROAMPHETAMINE 4.5 MG/1
4.5 PATCH, EXTENDED RELEASE TRANSDERMAL DAILY
Qty: 30 PATCH | Refills: 0 | Status: SHIPPED | OUTPATIENT
Start: 2024-07-22

## 2024-07-22 RX ORDER — ESCITALOPRAM OXALATE 5 MG/5ML
5 SOLUTION ORAL DAILY
Qty: 240 ML | Refills: 0 | Status: SHIPPED | OUTPATIENT
Start: 2024-07-22

## 2024-07-22 NOTE — PROGRESS NOTES
"Chief Complaint  Depression (Follow up ) and Obesity (Follow up )    Subjective          Purvi Foreman presents to [facility]  History of Present Illness  Status post gastric sleeve- patient reports nausea and vomiting have improved. She had EGD with balloon dilation. Patient previously on vyvanse which helped with ADD and control of appetite. Patient has difficulty taking pill medication. She is open to patch and liquid formulations.   MDD- patient unable to take mental health medication. She has stopped going to counseling because could not write medication. Patient denies HI and SI. Patient reports having some difficult days.       Current Outpatient Medications   Medication Instructions    escitalopram (LEXAPRO) 5 mg, Oral, Daily    Flintstones Complete (FLINTSTONES) chewable tablet 2 tablets, Oral, Daily    Levonorgestrel (Mirena, 52 MG,) 20 MCG/DAY intrauterine device IUD 1 each, Intrauterine, Once    Xelstrym 4.5 mg, Transdermal, Daily       The following portions of the patient's history were reviewed and updated as appropriate: allergies, current medications, past family history, past medical history, past social history, past surgical history, and problem list.    Objective   Vital Signs:   /76 (BP Location: Right arm)   Pulse 97   Temp 98.9 °F (37.2 °C) (Temporal)   Ht 175.3 cm (69\")   Wt 115 kg (253 lb)   SpO2 98%   BMI 37.36 kg/m²     BP Readings from Last 3 Encounters:   07/22/24 112/76   06/25/24 120/76   04/19/24 115/74     Wt Readings from Last 3 Encounters:   07/22/24 115 kg (253 lb)   06/25/24 119 kg (262 lb)   04/19/24 126 kg (278 lb 12.8 oz)         Physical Exam   Appearance: No acute distress, well-nourished  Head: normocephalic, atraumatic  Eyes: extraocular movements intact, no scleral icterus, no conjunctival injection  Ears, Nose, and Throat: external ears normal, nares patent, moist mucous membranes  Cardiovascular: " regular rate and rhythm. no murmurs, rubs, or gallops. no edema  Respiratory: breathing comfortably, symmetric chest rise, clear to auscultation bilaterally. No wheezes, rales, or rhonchi.  Neuro: alert and oriented to time, place, and person. Normal gait  Psych: normal mood and affect     Result Review :   The following data was reviewed by: Kendrick Thompson Jr, MD on 07/22/2024:  Common labs          3/5/2024    07:44 3/19/2024    16:10 6/25/2024    13:52   Common Labs   Glucose 105  92  83    BUN 12  9  10    Creatinine 0.93  0.80  0.95    Sodium 138  140  138    Potassium 4.1  4.3  3.8    Chloride 109  105  103    Calcium 8.2  9.3  9.7    Albumin 3.6  4.3  4.4    Total Bilirubin 0.3  0.7  0.7    Alkaline Phosphatase 67  72  85    AST (SGOT) 22  14  16    ALT (SGPT) 22  10  10    WBC 7.78  4.50  6.20    Hemoglobin 10.7  12.9  13.5    Hematocrit 32.4  40.0  41.6    Platelets 221  227  255        Lab Results   Component Value Date    SARSANTIGEN Not Detected 08/10/2022    COVID19 Not Detected 08/10/2022    RAPFLUA Negative 08/08/2022    RAPFLUB Negative 08/08/2022    FLUAAG Not Detected 08/10/2022    FLUBAG Not Detected 08/10/2022    RAPSCRN Negative 08/10/2022    POCPREGUR Negative 04/19/2024    INR 0.95 (L) 01/16/2019    BILIRUBINUR Negative 03/19/2024     Last Urine Toxicity  More data exists         Latest Ref Rng & Units 7/22/2024 7/11/2023   LAST URINE TOXICITY RESULTS   Amphetamine, Urine Qual Negative Negative  Positive    Barbiturates Screen, Urine Negative Negative  Negative    Benzodiazepine Screen, Urine Negative Negative  Negative    Buprenorphine, Screen, Urine Negative Negative  Negative    Cocaine Screen, Urine Negative Negative  Negative    Methadone Screen , Urine Negative Negative  Negative    Methamphetamine, Ur Negative Negative  Negative        Assessment and Plan    Diagnoses and all orders for this visit:    1. YUMI (generalized anxiety disorder) (Primary)  Comments:  restart lexapro  with liquid formulation  Orders:  -     escitalopram (LEXAPRO) 1 mg/mL solution; Take 5 mL by mouth Daily.  Dispense: 240 mL; Refill: 0    2. Nausea and vomiting, unspecified vomiting type  Comments:  improved at this time.    3. Other dysphagia  Comments:  s/p balloon dilation. doing better.    4. Class 3 severe obesity with serious comorbidity and body mass index (BMI) of 40.0 to 44.9 in adult, unspecified obesity type  Comments:  will start delsym to help with appetite control. SARAH and william reviewed  Orders:  -     Dextroamphetamine (Xelstrym) 4.5 MG/9HR patch; Place 4.5 mg on the skin as directed by provider Daily.  Dispense: 30 patch; Refill: 0    5. Encounter for long-term (current) use of other medications  -     [facility] Medline 12 Panel Urine Drug Screen          Medications Discontinued During This Encounter   Medication Reason    brompheniramine-pseudoephedrine-DM 30-2-10 MG/5ML syrup *Therapy completed    brompheniramine-pseudoephedrine-DM (Bromfed DM) 30-2-10 MG/5ML syrup *Therapy completed    escitalopram (Lexapro) 20 MG tablet Side effects    levothyroxine (Synthroid) 25 MCG tablet Side effects    traZODone (DESYREL) 50 MG tablet Side effects    buPROPion XL (Wellbutrin XL) 300 MG 24 hr tablet Side effects    busPIRone (BUSPAR) 10 MG tablet Side effects    cloNIDine (Catapres) 0.1 MG tablet Side effects    ursodiol (Actigall) 300 MG capsule Side effects    vitamin D (ERGOCALCIFEROL) 1.25 MG (09840 UT) capsule capsule Side effects          Follow Up   Return in about 2 months (around 9/22/2024).  Patient was given instructions and counseling regarding her condition or for health maintenance advice. Please see specific information pulled into the AVS if appropriate.       Kendrick Thompson Jr, MD  07/22/24  08:45 EDT

## 2024-07-24 ENCOUNTER — TELEPHONE (OUTPATIENT)
Dept: INTERNAL MEDICINE | Facility: CLINIC | Age: 29
End: 2024-07-24
Payer: COMMERCIAL

## 2024-07-24 NOTE — TELEPHONE ENCOUNTER
Caller: Purvi Foreman    Relationship: Self    Best call back number: [phone]     What was the call regarding: PATIENT STATES THAT SHE NEEDS A PRIOR AUTHORIZATION COMPLETED FOR HER Dextroamphetamine (Xelstrym) 4.5 MG/9HR patch     SHE STATES THAT HER PHARMACY TOLD HER THAT THEY SENT A PRIOR AUTHORIZATION TO THE OFFICE AND HAVEN'T HEARD ANYTHING BACK.     PATIENT STATES THAT SHE WOULD LIKE A CALLBACK WITH AN UPDATE. VOICEMAIL OK.

## 2024-08-16 NOTE — PROGRESS NOTES
"GYN Visit    Chief Complaint   Patient presents with    Discuss pregnancy after gastric surgery       HPI:   29 y.o. LMP: No LMP recorded.     Social History     Substance and Sexual Activity   Sexual Activity Yes    Partners: Male    Birth control/protection: I.U.D.    Comment: Mirena insertion 23     -Anxiety/Depression on Lexapro, doing well  -Hx GHTN  -BMI elevated s/p gastric sleeve, 312lbs in 2024, today 249lbs  -Snoring w chronic fatigue- more energy after sleeve and wt loss  -Hx T13, 16week loss    Considering pregnancy, would like to discuss risks w hx and age    History: PMHx, Meds, Allergies, PSHx, Social Hx, and POBHx all reviewed and updated.    PHYSICAL EXAM:  /76   Pulse 88   Ht 175.3 cm (69\")   Wt 113 kg (249 lb)   BMI 36.77 kg/m²   Facility age limit for growth %mili is 20 years.   General- NAD, alert and oriented, appropriate  Psych- Normal mood, good memory      ASSESSMENT AND PLAN:  Diagnoses and all orders for this visit:    1. Pre-conception counseling (Primary)  Comments:  Discussed age-related risk, 30 years of age and risk of Down syndrome 1 in 909, risk for all chromosomal abnormalities 1 and 385    2. Anxiety and depression  Comments:  Lexapro safe in pregnancy and lactation, recommend continue as needed for symptoms    3. History of gestational hypertension  Comments:  Would plan baseline labs and baby aspirin.  Risk of gestational hypertension reviewed including possible earlier gestational age and  delivery    4. Snoring with fatigue  Comments:  Risk of sleep apnea significant increased risk during pregnancy.  Would recommend sleep study, possible CPAP as needed  Orders:  -     Ambulatory Referral to Sleep Lab    5. Trisomy 13, hx of  Comments:  Recurrence risk 1% or less          Follow Up:  Return for PRN desiring IUD removal.          Gertrude Bower DO  2024    [facility]  [facility] [address] [place] [address]  Dept: [phone]  Dept Fax: [phone]  Loc: [phone]  Loc Fax: [phone]

## 2024-08-19 ENCOUNTER — OFFICE VISIT (OUTPATIENT)
Dept: OBSTETRICS AND GYNECOLOGY | Facility: CLINIC | Age: 29
End: 2024-08-19
Payer: COMMERCIAL

## 2024-08-19 VITALS
HEART RATE: 88 BPM | SYSTOLIC BLOOD PRESSURE: 114 MMHG | DIASTOLIC BLOOD PRESSURE: 76 MMHG | WEIGHT: 249 LBS | HEIGHT: 71 IN | BODY MASS INDEX: 34.86 KG/M2

## 2024-08-19 DIAGNOSIS — Z31.69 PRE-CONCEPTION COUNSELING: Primary | ICD-10-CM

## 2024-08-19 DIAGNOSIS — R06.83 SNORING: ICD-10-CM

## 2024-08-19 DIAGNOSIS — Q91.7 TRISOMY 13: ICD-10-CM

## 2024-08-19 DIAGNOSIS — F41.9 ANXIETY: ICD-10-CM

## 2024-08-19 DIAGNOSIS — Z87.59 HISTORY OF GESTATIONAL HYPERTENSION: ICD-10-CM

## 2024-08-19 PROCEDURE — 99213 OFFICE O/P EST LOW 20 MIN: CPT | Performed by: OBSTETRICS & GYNECOLOGY

## 2024-09-06 ENCOUNTER — OFFICE VISIT (OUTPATIENT)
Dept: SLEEP MEDICINE | Facility: HOSPITAL | Age: 29
End: 2024-09-06
Payer: COMMERCIAL

## 2024-09-06 VITALS
HEART RATE: 111 BPM | HEIGHT: 71 IN | WEIGHT: 249 LBS | OXYGEN SATURATION: 99 % | SYSTOLIC BLOOD PRESSURE: 127 MMHG | BODY MASS INDEX: 34.86 KG/M2 | DIASTOLIC BLOOD PRESSURE: 72 MMHG

## 2024-09-06 DIAGNOSIS — G47.33 OSA (OBSTRUCTIVE SLEEP APNEA): Primary | ICD-10-CM

## 2024-09-06 PROCEDURE — G0463 HOSPITAL OUTPT CLINIC VISIT: HCPCS

## 2024-09-06 NOTE — PROGRESS NOTES
[facility]      Patient Care Team:  Kendrick Thompson Jr., MD as PCP - General (Internal Medicine)    Referring Provider: Gertrude Bower DO    Chief complaint:   Snoring and EDS.    History of present illness:    Subjective   This is a 29 year old female patient, with no PMH.    She reported loud snoring which disturbs her  and awakens her from sleep. She reported waking up at night coughing a lot and sometimes panicking after a nightmare.   She wakes up with a dry mouth and sometimes headache.  Additional symptoms include excessive sweating at night and grinding teeth.    S/p gastric sleeve 3/4/24 and lost 70 Lb but noted that her snoring got worse.         Sleep schedule:  -Bedtime: 8:30 AM  -Sleep latency: Not long  -Wake up time: 4:30 PM , does not feel refreshed  -Nocturnal awakenin-2 times because of changing position or nocturia 0-1.  No difficulties going back to sleep.  -Perceived sleep hours: 7-8        ESS: Total score: 10     Her mother has sleep apnea and uses a CPAP.     Review of Systems  Constitutional: No fever or chills. No changes in appetite..  Fatigue  ENMT: Nasal congestion.  Cardiovascular: No chest pain, palpitation or legs swelling.    Respiratory: No dyspnea, cough or wheezing.  Gastrointestinal: No constipation, diarrhea, abdominal pain or acid reflux  Neurology: No headache, weakness, numbness or dizziness.   Musculoskeletal: No joints pain, stiffness or swelling.   Psychiatry: Anxiety and depression.  Hem/Lymphatic: No swollen glands or easy bruising.  Integumentary: No rash.  Endocrinology: No excessive thirst, cold or warm intolerance.   Urinary: No dysuria, bloody urine but frequent urination.     History  Past Medical History:   Diagnosis Date    Allergic     Anxiety     Depression     Disease of thyroid gland     Dysphagia     Foot pain, bilateral     History of gestational hypertension     Hypertension      No meds    Insomnia     Migraine WITH aura     Obesity     PONV (postoperative nausea and vomiting)     Seasonal allergies    ,   Past Surgical History:   Procedure Laterality Date    APPENDECTOMY  2012    ENDOSCOPY N/A 1/30/2024    Procedure: ESOPHAGOGASTRODUODENOSCOPY WITH BIOPSY;  Surgeon: Josue Moore Jr., MD;  Location: [facility] ENDOSCOPY;  Service: General;  Laterality: N/A;  PRE- DYSPEPSIA  POST- GASTRITIS, HIATAL HERNIA    ENDOSCOPY N/A 4/19/2024    Procedure: ESOPHAGOGASTRODUODENOSCOPY WITH BALLOON DILITATION SIZE 18-20;  Surgeon: Josue Moore Jr., MD;  Location: [facility] ENDOSCOPY;  Service: General;  Laterality: N/A;  PRE: DYSPHAGIA /   POST: SAME    GASTRIC SLEEVE LAPAROSCOPIC N/A 3/4/2024    Procedure: Sleeve gastrectomy LAPAROSCOPIC WITH DAVINCI ROBOT With Hiatal Hernia Repair;  Surgeon: Josue Moore Jr., MD;  [place] [facility] [place] [facility];  Service: Robotics - DaVinci;  Laterality: N/A;    RETAINED PLACENTA REMOVAL  2019    D+C w second tri loss   ,   Family History   Problem Relation Age of Onset    Uterine cancer Mother         endometrial hyperplasia s/p hyst    Endometrial cancer Mother         HYPERPLASIA    Hypertension Mother     Anxiety disorder Mother     Depression Mother     Sleep apnea Mother     Insomnia Mother     Hypertension Father     Diabetes Father     Esophageal cancer Maternal Grandfather     Lung cancer Paternal Grandmother     Stroke Neg Hx     Breast cancer Neg Hx     Colon cancer Neg Hx     Ovarian cancer Neg Hx     Pulmonary embolism Neg Hx     Deep vein thrombosis Neg Hx     Malig Hyperthermia Neg Hx    ,   Social History     Socioeconomic History    Marital status:    Tobacco Use    Smoking status: Never    Smokeless tobacco: Never   Vaping Use    Vaping status: Never Used   Substance and Sexual Activity    Alcohol use: Yes     Comment: drinks rarely    Drug use: Never    Sexual activity: Yes     Partners: Male     Birth control/protection: I.U.D.      "Comment: Mirena insertion 04/04/23     E-cigarette/Vaping    E-cigarette/Vaping Use Never User      E-cigarette/Vaping Substances    Nicotine No     THC No     CBD No     Flavoring No      E-cigarette/Vaping Devices    Disposable No     Pre-filled or Refillable Cartridge No     Refillable Tank No     Pre-filled Pod No          , (Not in a hospital admission) , and Allergies:  Cephalexin, Penicillins, and Latex    Medications:    Current Outpatient Medications:     escitalopram (LEXAPRO) 1 mg/mL solution, Take 5 mL by mouth Daily., Disp: 240 mL, Rfl: 0    Levonorgestrel (Mirena, 52 MG,) 20 MCG/DAY intrauterine device IUD, To be inserted one time by prescriber. Route intrauterine., Disp: , Rfl:     Flintstones Complete (FLINTSTONES) chewable tablet, Chew 2 tablets Daily. (Patient not taking: Reported on 8/19/2024), Disp: , Rfl:       Objective   Vital Signs:  Vitals:    09/06/24 0700   BP: 127/72   Pulse: 111   SpO2: 99%   Weight: 113 kg (249 lb)   Height: 179.1 cm (70.5\")     Body mass index is 35.22 kg/m².  Neck Circumference: 14 inches     Physical Exam:  Neck Circumference: 14 inches     Constitutional: Not in acute distress.  Eyes: Injected conjunctiva, EOMI. pupils equal reactive to light.  ENMT: Hughes score 3. Mallampati score 3. No oral thrush. Tonsils grade 1.   Neck: Large. No thyromegaly.  Trachea midline.  Heart: Regular rhythm and rate, no murmur  Lungs: Good and equal air entry bilaterally. No crackles or wheezing.  Nonlabored breathing.       Abdomen: Obese.  Soft.  No tenderness.  Positive bowel sounds.  Extremities: No cyanosis, clubbing or pitting edema.  Warm extremities and well-perfused.  Neuro: Conscious, alert, oriented x3.  Gait is normal.  Strength 5/5 in arms and legs.  Psych: Appropriate mood and affect.    Integumentary: No rash.  Normal skin turgor.  Lymphatic: No palpable cervical or supraclavicular lymph nodes.    Diagnostic data:  Lab Results   Component Value Date    HGBA1C 5.20 " 11/20/2023     Total Cholesterol   Date Value Ref Range Status   01/31/2024 162 0 - 200 mg/dL Final     Triglycerides   Date Value Ref Range Status   01/31/2024 87 0 - 150 mg/dL Final     HDL Cholesterol   Date Value Ref Range Status   01/31/2024 45 40 - 60 mg/dL Final     Hemoglobin   Date Value Ref Range Status   06/25/2024 13.5 12.0 - 15.9 g/dL Final     CO2   Date Value Ref Range Status   06/25/2024 24.1 22.0 - 29.0 mmol/L Final        Assessment   Snoring  Obesity, BMI 35  Hypersomnia      Plan:  Check HST. We discussed the pathophysiology of sleep apnea, testing and therapy which include CPAP and weight loss.  Patient is agreeable with CPAP therapy if needed.    Counseled for weight loss.  Encouraged to exercise regularly and cut down on carbohydrates.  Discussed that losing weight may decrease the severity of sleep apnea and obviate the need of CPAP therapy.    Obtain at least 7-8 hours of sleep.  Will readdress hypersomnia after evaluation for sleep apnea.    RTC after testing.        Madina Li MD  09/06/24  08:31 EDT    This note was dictated utilizing Dragon dictation

## 2024-09-09 ENCOUNTER — TELEPHONE (OUTPATIENT)
Dept: PODIATRY | Facility: CLINIC | Age: 29
End: 2024-09-09

## 2024-09-09 NOTE — TELEPHONE ENCOUNTER
Caller: Purvi Foreman    Relationship to patient: Self    Best call back number: [phone] (home)       Chief complaint: BILATERAL FOOT    Type of visit: INJECTION    Requested date: NA      If rescheduling, when is the original appointment: NA      Additional notes:BILATERAL FOOT- NO RECENT IMAGING- NO HX OF SX - REQUESTING INJECTIONS- LAST GIVEN 10-26/2022        PATIENT SEES DR HARRIS

## 2024-09-12 ENCOUNTER — OFFICE VISIT (OUTPATIENT)
Dept: PODIATRY | Facility: CLINIC | Age: 29
End: 2024-09-12
Payer: COMMERCIAL

## 2024-09-12 VITALS
SYSTOLIC BLOOD PRESSURE: 111 MMHG | WEIGHT: 249 LBS | TEMPERATURE: 97.8 F | BODY MASS INDEX: 34.86 KG/M2 | HEART RATE: 85 BPM | OXYGEN SATURATION: 99 % | HEIGHT: 71 IN | DIASTOLIC BLOOD PRESSURE: 79 MMHG

## 2024-09-12 DIAGNOSIS — M79.672 FOOT PAIN, LEFT: ICD-10-CM

## 2024-09-12 DIAGNOSIS — S92.325A CLOSED NONDISPLACED FRACTURE OF SECOND METATARSAL BONE OF LEFT FOOT, INITIAL ENCOUNTER: ICD-10-CM

## 2024-09-12 DIAGNOSIS — M84.375A STRESS FRACTURE OF LEFT FOOT, INITIAL ENCOUNTER: Primary | ICD-10-CM

## 2024-09-12 NOTE — PROGRESS NOTES
[facility] - PODIATRY    Today's Date: 09/12/24    Patient Name: Purvi Foreman  MRN: [id number]  CSN: [id number]  PCP: Kendrick Thompson Jr., MD  Referring Provider: No ref. provider found    SUBJECTIVE     Chief Complaint   Patient presents with    Left Foot - Pain     Top of foot pain, radiates about 1.5 weeks        HPI: Purvi Foreman, a 29 y.o.female, presents to clinic.    New, Established, New Problem: New problem    Location: Left second metatarsal shaft area    Duration: 1 1/2 weeks    Onset: Insidious    Nature: Sore    Aggravating factors:  Patient relates pain is aggravated by shoe gear and ambulation.      Previous Treatment: None    Patient denies any fevers, chills, nausea, vomiting, shortness of breath, nor any other constitutional signs nor symptoms.    Recent medical changes: Recent gastric sleeve surgery.  Currently has lost 75 pounds since her last surgery.    Past Medical History:   Diagnosis Date    Allergic     Anxiety     Depression     Disease of thyroid gland     Dysphagia     Foot pain, bilateral     Foot pain, left     History of gestational hypertension     Hypertension     No meds    Insomnia     Migraine WITH aura     Obesity     PONV (postoperative nausea and vomiting)     Seasonal allergies      Past Surgical History:   Procedure Laterality Date    APPENDECTOMY  2012    ENDOSCOPY N/A 1/30/2024    Procedure: ESOPHAGOGASTRODUODENOSCOPY WITH BIOPSY;  Surgeon: Josue Moore Jr., MD;  Location: [facility] ENDOSCOPY;  Service: General;  Laterality: N/A;  PRE- DYSPEPSIA  POST- GASTRITIS, HIATAL HERNIA    ENDOSCOPY N/A 4/19/2024    Procedure: ESOPHAGOGASTRODUODENOSCOPY WITH BALLOON DILITATION SIZE 18-20;  Surgeon: Josue Moore Jr., MD;  Location: [facility] ENDOSCOPY;  Service: General;  Laterality: N/A;  PRE: DYSPHAGIA /   POST: SAME    GASTRIC SLEEVE LAPAROSCOPIC N/A 3/4/2024    Procedure: Sleeve gastrectomy LAPAROSCOPIC WITH DAVINCI ROBOT With  Hiatal Hernia Repair;  Surgeon: Josue Moore Jr., MD;  [place] [facility] [place] [facility] [place]  Service: Robotics - DaVinci;  Laterality: N/A;    RETAINED PLACENTA REMOVAL  2019    D+C w second tri loss     Family History   Problem Relation Age of Onset    Uterine cancer Mother         endometrial hyperplasia s/p hyst    Endometrial cancer Mother         HYPERPLASIA    Hypertension Mother     Anxiety disorder Mother     Depression Mother     Sleep apnea Mother     Insomnia Mother     Hypertension Father     Diabetes Father     Esophageal cancer Maternal Grandfather     Lung cancer Paternal Grandmother     Stroke Neg Hx     Breast cancer Neg Hx     Colon cancer Neg Hx     Ovarian cancer Neg Hx     Pulmonary embolism Neg Hx     Deep vein thrombosis Neg Hx     Malig Hyperthermia Neg Hx      Social History     Socioeconomic History    Marital status:    Tobacco Use    Smoking status: Never    Smokeless tobacco: Never   Vaping Use    Vaping status: Never Used   Substance and Sexual Activity    Alcohol use: Yes     Comment: drinks rarely    Drug use: Never    Sexual activity: Yes     Partners: Male     Birth control/protection: I.U.D.     Comment: Mirena insertion 04/04/23     Allergies   Allergen Reactions    Cephalexin Anaphylaxis    Penicillins Swelling and Rash    Latex Rash     Current Outpatient Medications   Medication Sig Dispense Refill    escitalopram (LEXAPRO) 1 mg/mL solution Take 5 mL by mouth Daily. 240 mL 0    Levonorgestrel (Mirena, 52 MG,) 20 MCG/DAY intrauterine device IUD To be inserted one time by prescriber. Route intrauterine.      Flintstones Complete (FLINTSTONES) chewable tablet Chew 2 tablets Daily. (Patient not taking: Reported on 8/19/2024)       No current facility-administered medications for this visit.     Review of Systems   Constitutional: Negative.    Musculoskeletal:         Left second metatarsal shaft area   All other systems reviewed and are negative.      OBJECTIVE     Vitals:     09/12/24 0820   BP: 111/79   Pulse: 85   Temp: 97.8 °F (36.6 °C)   SpO2: 99%       WBC   Date Value Ref Range Status   06/25/2024 6.20 3.40 - 10.80 10*3/mm3 Final     RBC   Date Value Ref Range Status   06/25/2024 4.85 3.77 - 5.28 10*6/mm3 Final     Hemoglobin   Date Value Ref Range Status   06/25/2024 13.5 12.0 - 15.9 g/dL Final     Hematocrit   Date Value Ref Range Status   06/25/2024 41.6 34.0 - 46.6 % Final     MCV   Date Value Ref Range Status   06/25/2024 85.8 79.0 - 97.0 fL Final     MCH   Date Value Ref Range Status   06/25/2024 27.8 26.6 - 33.0 pg Final     MCHC   Date Value Ref Range Status   06/25/2024 32.5 31.5 - 35.7 g/dL Final     RDW   Date Value Ref Range Status   06/25/2024 13.0 12.3 - 15.4 % Final     RDW-SD   Date Value Ref Range Status   06/25/2024 40.2 37.0 - 54.0 fl Final     MPV   Date Value Ref Range Status   06/25/2024 11.6 6.0 - 12.0 fL Final     Platelets   Date Value Ref Range Status   06/25/2024 255 140 - 450 10*3/mm3 Final     Neutrophil %   Date Value Ref Range Status   06/25/2024 64.4 42.7 - 76.0 % Final     Lymphocyte %   Date Value Ref Range Status   06/25/2024 28.4 19.6 - 45.3 % Final     Monocyte %   Date Value Ref Range Status   06/25/2024 4.0 (L) 5.0 - 12.0 % Final     Eosinophil %   Date Value Ref Range Status   06/25/2024 2.4 0.3 - 6.2 % Final     Basophil %   Date Value Ref Range Status   06/25/2024 0.6 0.0 - 1.5 % Final     Immature Grans %   Date Value Ref Range Status   06/25/2024 0.2 0.0 - 0.5 % Final     Neutrophils, Absolute   Date Value Ref Range Status   06/25/2024 3.99 1.70 - 7.00 10*3/mm3 Final     Lymphocytes, Absolute   Date Value Ref Range Status   06/25/2024 1.76 0.70 - 3.10 10*3/mm3 Final     Monocytes, Absolute   Date Value Ref Range Status   06/25/2024 0.25 0.10 - 0.90 10*3/mm3 Final     Eosinophils, Absolute   Date Value Ref Range Status   06/25/2024 0.15 0.00 - 0.40 10*3/mm3 Final     Basophils, Absolute   Date Value Ref Range Status   06/25/2024 0.04 0.00  - 0.20 10*3/mm3 Final     Immature Grans, Absolute   Date Value Ref Range Status   06/25/2024 0.01 0.00 - 0.05 10*3/mm3 Final     nRBC   Date Value Ref Range Status   06/25/2024 0.0 0.0 - 0.2 /100 WBC Final         Lab Results   Component Value Date    GLUCOSE 83 06/25/2024    BUN 10 06/25/2024    CREATININE 0.95 06/25/2024     06/25/2024    K 3.8 06/25/2024     06/25/2024    CALCIUM 9.7 06/25/2024    PROTEINTOT 7.2 06/25/2024    ALBUMIN 4.4 06/25/2024    ALT 10 06/25/2024    AST 16 06/25/2024    ALKPHOS 85 06/25/2024    BILITOT 0.7 06/25/2024    GLOB 2.8 06/25/2024    AGRATIO 1.6 06/25/2024    BCR 10.5 06/25/2024    ANIONGAP 10.9 06/25/2024    EGFR 82.8 06/25/2024       Patient seen in no apparent distress.      PHYSICAL EXAM:     Foot/Ankle Exam    GENERAL  Appearance:  appears stated age and obese  Orientation:  AAOx3  Affect:  appropriate  Gait:  unimpaired  Assistance:  independent  Right shoe gear: sandal  Left shoe gear: sandal    VASCULAR     Right Foot Vascularity   Normal vascular exam    Dorsalis pedis:  2+  Posterior tibial:  2+  Skin temperature:  warm  Edema grading:  None  CFT:  < 3 seconds  Pedal hair growth:  Present  Varicosities:  none     Left Foot Vascularity   Normal vascular exam    Dorsalis pedis:  2+  Posterior tibial:  2+  Skin temperature:  warm  Edema grading:  None  CFT:  < 3 seconds  Pedal hair growth:  Present  Varicosities:  none     NEUROLOGIC     Right Foot Neurologic   Normal sensation    Light touch sensation: normal  Vibratory sensation: normal  Hot/Cold sensation: normal  Protective Sensation using [place] Monofilament:   Sites intact: 10  Sites tested: 10     Left Foot Neurologic   Normal sensation    Light touch sensation: normal  Vibratory sensation: normal  Hot/Cold sensation:  normal  Protective Sensation using [place] Monofilament:   Sites intact: 10  Sites tested: 10    MUSCULOSKELETAL     Left Foot Musculoskeletal   Ecchymosis:   none  Tenderness:  metatarsal 2    MUSCLE STRENGTH     Right Foot Muscle Strength   Foot dorsiflexion:  4  Foot plantar flexion:  4  Foot inversion:  4  Foot eversion:  4     Left Foot Muscle Strength   Foot dorsiflexion:  4  Foot plantar flexion:  4  Foot inversion:  4  Foot eversion:  4    RANGE OF MOTION     Right Foot Range of Motion   Foot and ankle ROM within normal limits       Left Foot Range of Motion   Foot and ankle ROM within normal limits      DERMATOLOGIC      Right Foot Dermatologic   Skin  Right foot skin is intact.      Left Foot Dermatologic   Skin  Left foot skin is intact.       RADIOLOGY:      XR Foot 3+ View Left    Result Date: 9/12/2024  Narrative: IN-OFFICE IMAGING:  Standing, weightbearing, 3 view, AP, MO, Lateral, Left foot Indication:  Foot Pain Findings: Contracted lesser digits.  Diffuse degenerative changes seen in midfoot.  No periosteal reactions nor osteolytic changes seen.  No occult fractures seen. Comparison: No comparison views available.     ASSESSMENT/PLAN     Diagnoses and all orders for this visit:    1. Stress fracture of left foot, initial encounter (Primary)    2. Closed nondisplaced fracture of second metatarsal bone of left foot, initial encounter    3. Foot pain, left        Comprehensive lower extremity examination and evaluation was performed.    Discussed findings and treatment plan including risks, benefits, and treatment options with patient in detail. Patient agreed with treatment plan.    Medications and allergies reviewed.  Reviewed available lab values along with other pertinent labs.  These were discussed with the patient.    CAM walker applied to Left lower leg.  Patient instructed to utilize for partial-weight bearing at all time with CAM walker.  Dr. Thomas advised patient may resume driving, but advised not to drive while wearing an ambulatory device.  Advised quick/hard depression of brakes could cause injury to areas.  Also advised of possible legal  implications while driving in restrictive device.  The patient states understanding and agreement with these instructions.    Rice Therapy: It is important to treat any injury as soon as possible to help control swelling and increase recovery time. The recognized regimen for immediate treatment of sport injuries includes rest, ice (cold application), compression, and elevation (RICE). Remove the injured athlete from play, apply ice to the affected area, wrap or compress the injured area with an elastic bandage when appropriate, and elevate the injured area above heart level to reduce swelling.  The patient is to not use ice for longer than 20 minutes at a time, with at least 20 minutes of no ice usage between applications.     Patient instructed use OTC analgesics with dosing per package insert as needed.      The patient states understanding and agreement with this plan.    An After Visit Summary was printed and given to the patient at discharge, including (if requested) any available informative/educational handouts regarding diagnosis, treatment, or medications. All questions were answered to patient/family satisfaction. Should symptoms fail to improve or worsen they agree to call or return to clinic or to go to the Emergency Department. Discussed the importance of following up with any needed screening tests/labs/specialist appointments and any requested follow-up recommended by me today. Importance of maintaining follow-up discussed and patient accepts that missed appointments can delay diagnosis and potentially lead to worsening of conditions.    Return in about 4 weeks (around 10/10/2024) for X-ray needed., or sooner if acute issues arise.    This document has been electronically signed by Rigoberto Thomas DPM on September 12, 2024 09:09 EDT

## 2024-09-24 ENCOUNTER — OFFICE VISIT (OUTPATIENT)
Dept: INTERNAL MEDICINE | Facility: CLINIC | Age: 29
End: 2024-09-24
Payer: COMMERCIAL

## 2024-09-24 ENCOUNTER — PRIOR AUTHORIZATION (OUTPATIENT)
Dept: INTERNAL MEDICINE | Facility: CLINIC | Age: 29
End: 2024-09-24

## 2024-09-24 VITALS
HEART RATE: 97 BPM | WEIGHT: 242.5 LBS | OXYGEN SATURATION: 98 % | HEIGHT: 71 IN | SYSTOLIC BLOOD PRESSURE: 107 MMHG | DIASTOLIC BLOOD PRESSURE: 75 MMHG | TEMPERATURE: 97.8 F | BODY MASS INDEX: 33.95 KG/M2

## 2024-09-24 DIAGNOSIS — G47.00 INSOMNIA, UNSPECIFIED TYPE: ICD-10-CM

## 2024-09-24 DIAGNOSIS — F50.81 BINGE EATING DISORDER: ICD-10-CM

## 2024-09-24 DIAGNOSIS — F41.1 GAD (GENERALIZED ANXIETY DISORDER): Primary | ICD-10-CM

## 2024-09-24 DIAGNOSIS — M84.375D STRESS FRACTURE OF LEFT FOOT WITH ROUTINE HEALING, SUBSEQUENT ENCOUNTER: ICD-10-CM

## 2024-09-24 PROCEDURE — 99214 OFFICE O/P EST MOD 30 MIN: CPT | Performed by: INTERNAL MEDICINE

## 2024-09-24 RX ORDER — HYDROCODONE BITARTRATE AND ACETAMINOPHEN 5; 325 MG/1; MG/1
1 TABLET ORAL EVERY 8 HOURS PRN
Qty: 20 TABLET | Refills: 0 | Status: SHIPPED | OUTPATIENT
Start: 2024-09-24

## 2024-09-24 RX ORDER — ESCITALOPRAM OXALATE 5 MG/5ML
5 SOLUTION ORAL DAILY
Qty: 450 ML | Refills: 3 | Status: SHIPPED | OUTPATIENT
Start: 2024-09-24

## 2024-09-24 RX ORDER — LISDEXAMFETAMINE DIMESYLATE 30 MG/1
30 CAPSULE ORAL EVERY MORNING
Qty: 30 CAPSULE | Refills: 0 | Status: SHIPPED | OUTPATIENT
Start: 2024-09-24

## 2024-09-24 RX ORDER — TRAZODONE HYDROCHLORIDE 50 MG/1
50 TABLET, FILM COATED ORAL NIGHTLY
Qty: 60 TABLET | Refills: 0 | Status: SHIPPED | OUTPATIENT
Start: 2024-09-24

## 2024-09-25 ENCOUNTER — HOSPITAL ENCOUNTER (OUTPATIENT)
Dept: GENERAL RADIOLOGY | Facility: HOSPITAL | Age: 29
Discharge: HOME | End: 2024-09-25
Admitting: INTERNAL MEDICINE
Payer: COMMERCIAL

## 2024-09-25 ENCOUNTER — HOSPITAL ENCOUNTER (OUTPATIENT)
Dept: SLEEP MEDICINE | Facility: HOSPITAL | Age: 29
End: 2024-09-25
Payer: COMMERCIAL

## 2024-09-25 DIAGNOSIS — M84.375D STRESS FRACTURE OF LEFT FOOT WITH ROUTINE HEALING, SUBSEQUENT ENCOUNTER: ICD-10-CM

## 2024-09-25 DIAGNOSIS — G47.33 OSA (OBSTRUCTIVE SLEEP APNEA): ICD-10-CM

## 2024-09-25 DIAGNOSIS — M84.375D STRESS FRACTURE OF LEFT FOOT WITH ROUTINE HEALING, SUBSEQUENT ENCOUNTER: Primary | ICD-10-CM

## 2024-09-25 PROCEDURE — 95806 SLEEP STUDY UNATT&RESP EFFT: CPT

## 2024-09-25 PROCEDURE — 73630 X-RAY EXAM OF FOOT: CPT

## 2024-09-26 ENCOUNTER — OFFICE VISIT (OUTPATIENT)
Dept: BARIATRICS/WEIGHT MGMT | Facility: CLINIC | Age: 29
End: 2024-09-26
Payer: COMMERCIAL

## 2024-09-26 VITALS
WEIGHT: 239 LBS | HEART RATE: 92 BPM | SYSTOLIC BLOOD PRESSURE: 110 MMHG | BODY MASS INDEX: 35.4 KG/M2 | HEIGHT: 69 IN | TEMPERATURE: 97.9 F | DIASTOLIC BLOOD PRESSURE: 78 MMHG

## 2024-09-26 DIAGNOSIS — E66.9 OBESITY, CLASS I, BMI 30-34.9: Primary | ICD-10-CM

## 2024-09-26 DIAGNOSIS — Z98.84 S/P LAPAROSCOPIC SLEEVE GASTRECTOMY: ICD-10-CM

## 2024-09-26 DIAGNOSIS — Z71.3 DIETARY COUNSELING: ICD-10-CM

## 2024-09-27 ENCOUNTER — OFFICE VISIT (OUTPATIENT)
Dept: PODIATRY | Facility: CLINIC | Age: 29
End: 2024-09-27
Payer: COMMERCIAL

## 2024-09-27 VITALS
BODY MASS INDEX: 33.46 KG/M2 | SYSTOLIC BLOOD PRESSURE: 110 MMHG | OXYGEN SATURATION: 98 % | WEIGHT: 239 LBS | HEART RATE: 88 BPM | DIASTOLIC BLOOD PRESSURE: 77 MMHG | HEIGHT: 71 IN

## 2024-09-27 DIAGNOSIS — S92.325A CLOSED NONDISPLACED FRACTURE OF SECOND METATARSAL BONE OF LEFT FOOT, INITIAL ENCOUNTER: ICD-10-CM

## 2024-09-27 DIAGNOSIS — S92.125A CLOSED NONDISPLACED FRACTURE OF BODY OF LEFT TALUS, INITIAL ENCOUNTER: ICD-10-CM

## 2024-09-27 DIAGNOSIS — M84.375A STRESS FRACTURE OF LEFT FOOT, INITIAL ENCOUNTER: Primary | ICD-10-CM

## 2024-09-27 DIAGNOSIS — M79.672 FOOT PAIN, LEFT: ICD-10-CM

## 2024-09-27 PROBLEM — E66.811 OBESITY, CLASS I, BMI 30-34.9: Status: ACTIVE | Noted: 2024-06-25

## 2024-09-27 PROBLEM — E66.9 OBESITY, CLASS II, BMI 35-39.9: Status: RESOLVED | Noted: 2024-06-25 | Resolved: 2024-09-27

## 2024-09-27 PROBLEM — E66.812 OBESITY, CLASS II, BMI 35-39.9: Status: RESOLVED | Noted: 2024-06-25 | Resolved: 2024-09-27

## 2024-09-30 ENCOUNTER — HOSPITAL ENCOUNTER (OUTPATIENT)
Dept: MRI IMAGING | Facility: HOSPITAL | Age: 29
Discharge: HOME | End: 2024-09-30
Admitting: PODIATRIST
Payer: COMMERCIAL

## 2024-09-30 DIAGNOSIS — S92.125A CLOSED NONDISPLACED FRACTURE OF BODY OF LEFT TALUS, INITIAL ENCOUNTER: ICD-10-CM

## 2024-09-30 PROCEDURE — 73721 MRI JNT OF LWR EXTRE W/O DYE: CPT

## 2024-10-01 ENCOUNTER — OFFICE VISIT (OUTPATIENT)
Dept: PODIATRY | Facility: CLINIC | Age: 29
End: 2024-10-01
Payer: COMMERCIAL

## 2024-10-01 VITALS
HEIGHT: 71 IN | HEART RATE: 85 BPM | BODY MASS INDEX: 33.46 KG/M2 | DIASTOLIC BLOOD PRESSURE: 75 MMHG | OXYGEN SATURATION: 98 % | SYSTOLIC BLOOD PRESSURE: 117 MMHG | TEMPERATURE: 97.7 F | WEIGHT: 239 LBS

## 2024-10-01 DIAGNOSIS — M25.572 ACUTE LEFT ANKLE PAIN: ICD-10-CM

## 2024-10-01 DIAGNOSIS — M77.52 LEFT ANKLE TENDINITIS: Primary | ICD-10-CM

## 2024-10-01 PROCEDURE — 99213 OFFICE O/P EST LOW 20 MIN: CPT | Performed by: PODIATRIST

## 2024-10-01 RX ORDER — DICLOFENAC SODIUM 75 MG/1
75 TABLET, DELAYED RELEASE ORAL 2 TIMES DAILY
Qty: 60 TABLET | Refills: 1 | Status: SHIPPED | OUTPATIENT
Start: 2024-10-01 | End: 2024-10-31

## 2024-10-01 RX ORDER — METHYLPREDNISOLONE 4 MG
TABLET, DOSE PACK ORAL
Qty: 21 TABLET | Refills: 0 | Status: SHIPPED | OUTPATIENT
Start: 2024-10-01

## 2024-10-01 NOTE — PROGRESS NOTES
[facility] - PODIATRY    Today's Date: 10/01/24    Patient Name: Purvi Foreman  MRN: [id number]  CSN: [id number]  PCP: Kendrick Thompson Jr., MD  Referring Provider: No ref. provider found    SUBJECTIVE     Chief Complaint   Patient presents with    Left Foot - Follow-up     Here to discuss MRI results     HPI: Purvi Foreman, a 29 y.o.female, presents to clinic.    New, Established, New Problem: New problem  Location: Left rear foot  Duration: Past few days  Onset: Started after her child dropped an iPad on this area of her left rear foot  Nature: Sore, painful, achy  Aggravating factors:  Patient relates pain is aggravated by shoe gear and ambulation.    Previous Treatment: Patient was utilizing a cam walker for a left forefoot stress fracture.  Her PCP ordered an x-ray of her left foot.  MRI since her last visit.    Patient denies any fevers, chills, nausea, vomiting, shortness of breathe, nor any other constitutional signs nor symptoms.    Past Medical History:   Diagnosis Date    Allergic     Anxiety     Depression     Disease of thyroid gland     Dysphagia     Foot pain, bilateral     Foot pain, left     History of gestational hypertension     Hypertension     No meds    Insomnia     Migraine WITH aura     Obesity     PONV (postoperative nausea and vomiting)     Seasonal allergies     Stress fracture of left foot      Past Surgical History:   Procedure Laterality Date    APPENDECTOMY  2012    ENDOSCOPY N/A 1/30/2024    Procedure: ESOPHAGOGASTRODUODENOSCOPY WITH BIOPSY;  Surgeon: Josue Moore Jr., MD;  Location: [facility] ENDOSCOPY;  Service: General;  Laterality: N/A;  PRE- DYSPEPSIA  POST- GASTRITIS, HIATAL HERNIA    ENDOSCOPY N/A 4/19/2024    Procedure: ESOPHAGOGASTRODUODENOSCOPY WITH BALLOON DILITATION SIZE 18-20;  Surgeon: Josue Moore Jr., MD;  Location: [facility] ENDOSCOPY;  Service: General;  Laterality: N/A;  PRE: DYSPHAGIA /   POST: SAME    GASTRIC SLEEVE  LAPAROSCOPIC N/A 3/4/2024    Procedure: Sleeve gastrectomy LAPAROSCOPIC WITH DAVINCI ROBOT With Hiatal Hernia Repair;  Surgeon: Josue Moore Jr., MD;  Location: [facility];  Service: Robotics - DaVinci;  Laterality: N/A;    RETAINED PLACENTA REMOVAL  2019    D+C w second tri loss     Family History   Problem Relation Age of Onset    Uterine cancer Mother         endometrial hyperplasia s/p hyst    Endometrial cancer Mother         HYPERPLASIA    Hypertension Mother     Anxiety disorder Mother     Depression Mother     Sleep apnea Mother     Insomnia Mother     Hypertension Father     Diabetes Father     Esophageal cancer Maternal Grandfather     Lung cancer Paternal Grandmother     Stroke Neg Hx     Breast cancer Neg Hx     Colon cancer Neg Hx     Ovarian cancer Neg Hx     Pulmonary embolism Neg Hx     Deep vein thrombosis Neg Hx     Malig Hyperthermia Neg Hx      Social History     Socioeconomic History    Marital status:    Tobacco Use    Smoking status: Never    Smokeless tobacco: Never   Vaping Use    Vaping status: Never Used   Substance and Sexual Activity    Alcohol use: Yes     Comment: drinks rarely    Drug use: Never    Sexual activity: Yes     Partners: Male     Birth control/protection: I.U.D.     Comment: Mirena insertion 04/04/23     Allergies   Allergen Reactions    Cephalexin Anaphylaxis    Penicillins Swelling and Rash    Latex Rash     Current Outpatient Medications   Medication Sig Dispense Refill    escitalopram (LEXAPRO) 1 mg/mL solution Take 5 mL by mouth Daily. 450 mL 3    Flintstones Complete (FLINTSTONES) chewable tablet Chew 2 tablets Daily.      HYDROcodone-acetaminophen (NORCO) 5-325 MG per tablet Take 1 tablet by mouth Every 8 (Eight) Hours As Needed for Severe Pain. 20 tablet 0    Levonorgestrel (Mirena, 52 MG,) 20 MCG/DAY intrauterine device IUD To be inserted one time by prescriber. Route intrauterine.      lisdexamfetamine (Vyvanse) 30 MG capsule Take 1 capsule by  mouth Every Morning 30 capsule 0    traZODone (DESYREL) 50 MG tablet Take 1 tablet by mouth Every Night. 60 tablet 0    diclofenac (VOLTAREN) 75 MG EC tablet Take 1 tablet by mouth 2 (Two) Times a Day for 30 days. 60 tablet 1    methylPREDNISolone (MEDROL) 4 MG dose pack Take as directed 21 tablet 0     No current facility-administered medications for this visit.     Review of Systems   Constitutional: Negative.    Musculoskeletal:         Left rear foot   All other systems reviewed and are negative.      OBJECTIVE     Vitals:    10/01/24 1041   BP: 117/75   Pulse: 85   Temp: 97.7 °F (36.5 °C)   SpO2: 98%       WBC   Date Value Ref Range Status   06/25/2024 6.20 3.40 - 10.80 10*3/mm3 Final     RBC   Date Value Ref Range Status   06/25/2024 4.85 3.77 - 5.28 10*6/mm3 Final     Hemoglobin   Date Value Ref Range Status   06/25/2024 13.5 12.0 - 15.9 g/dL Final     Hematocrit   Date Value Ref Range Status   06/25/2024 41.6 34.0 - 46.6 % Final     MCV   Date Value Ref Range Status   06/25/2024 85.8 79.0 - 97.0 fL Final     MCH   Date Value Ref Range Status   06/25/2024 27.8 26.6 - 33.0 pg Final     MCHC   Date Value Ref Range Status   06/25/2024 32.5 31.5 - 35.7 g/dL Final     RDW   Date Value Ref Range Status   06/25/2024 13.0 12.3 - 15.4 % Final     RDW-SD   Date Value Ref Range Status   06/25/2024 40.2 37.0 - 54.0 fl Final     MPV   Date Value Ref Range Status   06/25/2024 11.6 6.0 - 12.0 fL Final     Platelets   Date Value Ref Range Status   06/25/2024 255 140 - 450 10*3/mm3 Final     Neutrophil %   Date Value Ref Range Status   06/25/2024 64.4 42.7 - 76.0 % Final     Lymphocyte %   Date Value Ref Range Status   06/25/2024 28.4 19.6 - 45.3 % Final     Monocyte %   Date Value Ref Range Status   06/25/2024 4.0 (L) 5.0 - 12.0 % Final     Eosinophil %   Date Value Ref Range Status   06/25/2024 2.4 0.3 - 6.2 % Final     Basophil %   Date Value Ref Range Status   06/25/2024 0.6 0.0 - 1.5 % Final     Immature Grans %   Date  Value Ref Range Status   06/25/2024 0.2 0.0 - 0.5 % Final     Neutrophils, Absolute   Date Value Ref Range Status   06/25/2024 3.99 1.70 - 7.00 10*3/mm3 Final     Lymphocytes, Absolute   Date Value Ref Range Status   06/25/2024 1.76 0.70 - 3.10 10*3/mm3 Final     Monocytes, Absolute   Date Value Ref Range Status   06/25/2024 0.25 0.10 - 0.90 10*3/mm3 Final     Eosinophils, Absolute   Date Value Ref Range Status   06/25/2024 0.15 0.00 - 0.40 10*3/mm3 Final     Basophils, Absolute   Date Value Ref Range Status   06/25/2024 0.04 0.00 - 0.20 10*3/mm3 Final     Immature Grans, Absolute   Date Value Ref Range Status   06/25/2024 0.01 0.00 - 0.05 10*3/mm3 Final     nRBC   Date Value Ref Range Status   06/25/2024 0.0 0.0 - 0.2 /100 WBC Final         Lab Results   Component Value Date    GLUCOSE 83 06/25/2024    BUN 10 06/25/2024    CREATININE 0.95 06/25/2024     06/25/2024    K 3.8 06/25/2024     06/25/2024    CALCIUM 9.7 06/25/2024    PROTEINTOT 7.2 06/25/2024    ALBUMIN 4.4 06/25/2024    ALT 10 06/25/2024    AST 16 06/25/2024    ALKPHOS 85 06/25/2024    BILITOT 0.7 06/25/2024    GLOB 2.8 06/25/2024    AGRATIO 1.6 06/25/2024    BCR 10.5 06/25/2024    ANIONGAP 10.9 06/25/2024    EGFR 82.8 06/25/2024       Patient seen in no apparent distress.      PHYSICAL EXAM:     Foot/Ankle Exam    GENERAL  Appearance:  appears stated age and obese  Orientation:  AAOx3  Affect:  appropriate  Gait:  unimpaired  Assistance:  independent  Left shoe gear: CAM boot    VASCULAR     Right Foot Vascularity   Normal vascular exam    Dorsalis pedis:  2+  Posterior tibial:  2+  Skin temperature:  warm  Edema grading:  None  CFT:  < 3 seconds  Pedal hair growth:  Present  Varicosities:  none     Left Foot Vascularity   Normal vascular exam    Dorsalis pedis:  2+  Posterior tibial:  2+  Skin temperature:  warm  Edema grading:  None  CFT:  < 3 seconds  Pedal hair growth:  Present  Varicosities:  none     NEUROLOGIC     Right Foot  Neurologic   Normal sensation    Light touch sensation: normal  Vibratory sensation: normal  Hot/Cold sensation: normal  Protective Sensation using Kohler-Stevan Monofilament:   Sites intact: 10  Sites tested: 10     Left Foot Neurologic   Normal sensation    Light touch sensation: normal  Vibratory sensation: normal  Hot/Cold sensation:  normal  Protective Sensation using Kohler-Stevan Monofilament:   Sites intact: 10  Sites tested: 10    MUSCULOSKELETAL     Left Foot Musculoskeletal   Tenderness:  ankle joint tenderness    MUSCLE STRENGTH     Right Foot Muscle Strength   Foot dorsiflexion:  4  Foot plantar flexion:  4  Foot inversion:  4  Foot eversion:  4     Left Foot Muscle Strength   Foot dorsiflexion:  4  Foot plantar flexion:  4  Foot inversion:  4  Foot eversion:  4    RANGE OF MOTION     Right Foot Range of Motion   Foot and ankle ROM within normal limits       Left Foot Range of Motion   Foot and ankle ROM within normal limits      DERMATOLOGIC      Right Foot Dermatologic   Skin  Right foot skin is intact.      Left Foot Dermatologic   Skin  Left foot skin is intact.     RADIOLOGY:      MRI Ankle Left Without Contrast    Result Date: 9/30/2024  Narrative: MRI ANKLE LEFT  WO CONTRAST Date of Exam: 9/30/2024 4:50 PM EDT Indication: Possible nondisplaced talar fracture.  Comparison: None available. Technique:  Routine multiplanar/multisequence images of the left ankle were obtained without contrast administration. FINDINGS: There is mild tendinopathy and tenosynovitis of the posterior tibialis tendon. There is no tendon tear or retraction. The flexor digitorum longus and flexor hallucis longus tendons are intact. Mild changes of tendinopathy and tenosynovitis are noted involving the peroneus longus and brevis tendons. There is no tendon tear or retraction. The anterior extensor tendons of the ankle are intact. Mild changes of distal Achilles tendinopathy are noted. There is no Achilles tendon tear.  There is no retraction of torn fibers. Mild changes of plantar fasciitis are noted. There is no disruption of the plantar fascia. The deltoid ligament complex and spring ligament are intact. The anterior talofibular ligament, calcaneal fibular ligament, and posterior talofibular ligament are intact. The anterior and posterior tibiofibular ligaments are intact. There is no joint effusion. There is no evidence for osteochondral injury of the talar dome. The articulations of the ankle, hindfoot, and midfoot are intact. No abnormal focal bone marrow signal is identified. There is no evidence for fracture. There is  no fracture involving the talus. The cortical margins are intact. No abnormal focal fluid collections are seen within the surrounding soft tissues.     Impression: 1.Mild tendinopathy and tenosynovitis of all the posterior tibialis tendon. There is no tendon tear or retraction. 2.Mild tendinopathy and tenosynovitis involving the peroneus longus and brevis tendons. There is no tendon disruption or retraction. 3.Mild changes of distal Achilles tendinopathy. There is no Achilles tendon tear. There is no retraction of torn fibers. 4.Mild changes of planter fasciitis. There is no disruption of the plantar fascia. 5.There is no evidence for fracture or contusion. There is no fracture of the talus. Electronically Signed: Mack Perez MD  9/30/2024 8:43 PM EDT  Workstation ID: [id number]    XR Foot 3+ View Left    Result Date: 9/25/2024  Narrative: XR FOOT 3+ VW LEFT Date of Exam: 9/25/2024 11:01 AM EDT Indication: Left dorsal foot pain for 2 weeks. Stress fracture. Comparison: September 12, 2024, June 21, 2022. Findings: No acute displaced fracture. No other definite radiographic evidence of stress injury. Mineralization and alignment appear within normal limits and similar to prior studies. Joint spaces appear preserved. Soft tissues appear unremarkable.     Impression: Impression: No radiographic findings of  acute osseous foot abnormality. MRI would be more sensitive for stress injury. Electronically Signed: Jordan Maddox  9/25/2024 1:46 PM EDT  Workstation ID: [id number]  ASSESSMENT/PLAN     Diagnoses and all orders for this visit:    1. Left ankle tendinitis (Primary)  -     methylPREDNISolone (MEDROL) 4 MG dose pack; Take as directed  Dispense: 21 tablet; Refill: 0  -     diclofenac (VOLTAREN) 75 MG EC tablet; Take 1 tablet by mouth 2 (Two) Times a Day for 30 days.  Dispense: 60 tablet; Refill: 1  -     Ambulatory Referral to Physical Therapy for Evaluation & Treatment    2. Acute left ankle pain    Comprehensive lower extremity examination and evaluation was performed.    Discussed findings and treatment plan including risks, benefits, and treatment options with patient in detail. Patient agreed with treatment plan.    Medications and allergies reviewed.  Reviewed available lab values along with other pertinent labs.  These were discussed with the patient.    Prescriptions written for physical therapy.    CAM walker applied to Left lower leg.  Patient instructed to utilize for partial-weight bearing at all time with CAM walker.  Dr. Thomas advised patient may resume driving, but advised not to drive while wearing an ambulatory device.  Advised quick/hard depression of brakes could cause injury to areas.  Also advised of possible legal implications while driving in restrictive device.  The patient states understanding and agreement with these instructions.    Rice Therapy: It is important to treat any injury as soon as possible to help control swelling and increase recovery time. The recognized regimen for immediate treatment of sport injuries includes rest, ice (cold application), compression, and elevation (RICE). Remove the injured athlete from play, apply ice to the affected area, wrap or compress the injured area with an elastic bandage when appropriate, and elevate the injured area above heart level to reduce  swelling.  The patient is to not use ice for longer than 20 minutes at a time, with at least 20 minutes of no ice usage between applications.     Patient instructed use OTC analgesics with dosing per package insert as needed.      The patient states understanding and agreement with this plan.    An After Visit Summary was printed and given to the patient at discharge, including (if requested) any available informative/educational handouts regarding diagnosis, treatment, or medications. All questions were answered to patient/family satisfaction. Should symptoms fail to improve or worsen they agree to call or return to clinic or to go to the Emergency Department. Discussed the importance of following up with any needed screening tests/labs/specialist appointments and any requested follow-up recommended by me today. Importance of maintaining follow-up discussed and patient accepts that missed appointments can delay diagnosis and potentially lead to worsening of conditions.    Return in about 1 month (around 11/1/2024) for Physical Therapy., or sooner if acute issues arise.    This document has been electronically signed by Rigoberto Thomas DPM on October 1, 2024 11:23 EDT

## 2024-10-22 ENCOUNTER — OFFICE VISIT (OUTPATIENT)
Dept: ORTHOPEDIC SURGERY | Facility: CLINIC | Age: 29
End: 2024-10-22
Payer: COMMERCIAL

## 2024-10-22 VITALS
SYSTOLIC BLOOD PRESSURE: 124 MMHG | BODY MASS INDEX: 32.76 KG/M2 | OXYGEN SATURATION: 98 % | WEIGHT: 234 LBS | DIASTOLIC BLOOD PRESSURE: 80 MMHG | HEIGHT: 71 IN

## 2024-10-22 DIAGNOSIS — M77.52 TENDONITIS OF ANKLE, LEFT: Primary | ICD-10-CM

## 2024-10-22 NOTE — PROGRESS NOTES
"Chief Complaint  Initial Evaluation and Pain of the Left Ankle (Mri and xray in chart)       Subjective      Purvi Foreman presents to [facility] for an evaluation of her left ankle. Her left ankle has been bothering her since the beginning of September. She reports her left leg went numb and when she got up and injured her left ankle and foot. She saw Dr. Dee where she was placed in a walking boot and had an MRI on her ankle where she was told she had a stress fracture. She has been taking an oral steroid and diclofenac. She reports her swelling to her ankle since she was in the boot. She presents today in a normal shoe. She reports she recently had a gastric sleeve done back in March. She has pain with walking and weight bearing.     Allergies   Allergen Reactions    Cephalexin Anaphylaxis    Penicillins Swelling and Rash    Latex Rash        Social History     Socioeconomic History    Marital status:    Tobacco Use    Smoking status: Never    Smokeless tobacco: Never   Vaping Use    Vaping status: Never Used   Substance and Sexual Activity    Alcohol use: Yes     Comment: drinks rarely    Drug use: Never    Sexual activity: Yes     Partners: Male     Birth control/protection: I.U.D.     Comment: Mirena insertion 04/04/23        I reviewed the patient's chief complaint, history of present illness, review of systems, past medical history, surgical history, family history, social history, medications, and allergy list.     Review of Systems     Constitutional: Denies fevers, chills, weight loss  Cardiovascular: Denies chest pain, shortness of breath  Skin: Denies rashes, acute skin changes  Neurologic: Denies headache, loss of consciousness  MSK: Left ankle pain       Vital Signs:   /80   Ht 179.1 cm (70.5\")   Wt 106 kg (234 lb)   SpO2 98%   BMI 33.10 kg/m²            Ortho Exam    Physical Exam  General:Alert. No acute distress     Left lower extremity: " moderate swelling, pain with dorsiflexion, pain with inversion, tender to the anterior  ankle, mild tenderness to the medial  ankle, calf soft, distal neurovascularly intact, positive EHL, FHL, GS, and TA. Sensation intact to all 5 nerves of the foot.        Procedures    Imaging Results (Most Recent)       None             Result Review :       MRI Ankle Left Without Contrast    Result Date: 9/30/2024  Narrative: MRI ANKLE LEFT  WO CONTRAST Date of Exam: 9/30/2024 4:50 PM EDT Indication: Possible nondisplaced talar fracture.  Comparison: None available. Technique:  Routine multiplanar/multisequence images of the left ankle were obtained without contrast administration. FINDINGS: There is mild tendinopathy and tenosynovitis of the posterior tibialis tendon. There is no tendon tear or retraction. The flexor digitorum longus and flexor hallucis longus tendons are intact. Mild changes of tendinopathy and tenosynovitis are noted involving the peroneus longus and brevis tendons. There is no tendon tear or retraction. The anterior extensor tendons of the ankle are intact. Mild changes of distal Achilles tendinopathy are noted. There is no Achilles tendon tear. There is no retraction of torn fibers. Mild changes of plantar fasciitis are noted. There is no disruption of the plantar fascia. The deltoid ligament complex and spring ligament are intact. The anterior talofibular ligament, calcaneal fibular ligament, and posterior talofibular ligament are intact. The anterior and posterior tibiofibular ligaments are intact. There is no joint effusion. There is no evidence for osteochondral injury of the talar dome. The articulations of the ankle, hindfoot, and midfoot are intact. No abnormal focal bone marrow signal is identified. There is no evidence for fracture. There is  no fracture involving the talus. The cortical margins are intact. No abnormal focal fluid collections are seen within the surrounding soft tissues.      Impression: 1.Mild tendinopathy and tenosynovitis of all the posterior tibialis tendon. There is no tendon tear or retraction. 2.Mild tendinopathy and tenosynovitis involving the peroneus longus and brevis tendons. There is no tendon disruption or retraction. 3.Mild changes of distal Achilles tendinopathy. There is no Achilles tendon tear. There is no retraction of torn fibers. 4.Mild changes of planter fasciitis. There is no disruption of the plantar fascia. 5.There is no evidence for fracture or contusion. There is no fracture of the talus. Electronically Signed: Makc Perez MD  9/30/2024 8:43 PM EDT  Workstation ID: [id number]    Home Sleep Study    Result Date: 9/28/2024  Narrative: Home sleep apnea test report Purvi Foreman 1995 [id number] 09/28/24 09:23 EDT Interpreting Physician: Madina Li MD Referring Physician:  Madina Li MD Primary Care Physician: Kendrick Thompson Jr., MD Study date: 9/25/24 Clinical Information: Patient is a 29 y.o. female pt who complains of snoring and waking up with cough. [place] Sleepiness Scale:  10 Methods: Study performed based on the standardized protocol. Desaturation is defined as drop in SPO2 of 4% Home sleep apnea test: Device: Dianne NightOne Findings: TRT (total recording time)= 492 min; MT (monitoring time)= 414 min. Patient experienced 17 obstructive apnea, 0 central apnea and 2 hypopneas resulting in total SERA: 2.7 events/h. Supine time was 414 minutes resulting in Supine SERA: 2.7 events/hour. CRISTIAN=0.7 events/h; Anuj SpO2=93 % and hypoxic burden: 0 min. Diagnosis: 1) Snoring 2) Hyersomnia Recommendations: General recommendations: - Weight loss is recommended. - Avoid driving or operating heavy machinery while sleepy. - Consider further evaluation for hypersomnia if clinically indicated. Electronically signed by Madina Li MD, 09/28/24, 9:35 AM EDT.      XR Foot 3+ View Left    Result Date: 9/25/2024  Narrative: XR FOOT 3+ VW LEFT Date  of Exam: 9/25/2024 11:01 AM EDT Indication: Left dorsal foot pain for 2 weeks. Stress fracture. Comparison: September 12, 2024, June 21, 2022. Findings: No acute displaced fracture. No other definite radiographic evidence of stress injury. Mineralization and alignment appear within normal limits and similar to prior studies. Joint spaces appear preserved. Soft tissues appear unremarkable.     Impression: Impression: No radiographic findings of acute osseous foot abnormality. MRI would be more sensitive for stress injury. Electronically Signed: Jordan Tan  9/25/2024 1:46 PM EDT  Workstation ID: [id number]         Assessment and Plan     Diagnoses and all orders for this visit:    1. Tendonitis of ankle, left (Primary)        The patient presents here today for an evaluation  of her left ankle.     Order placed today for physical therapy.     Lace up ankle brace given to the patient today.     May consider a referral to [facility] in the future if symptoms persist.     Call or return if worsening symptoms.    Follow Up     6 weeks     Patient was given instructions and counseling regarding her condition or for health maintenance advice. Please see specific information pulled into the AVS if appropriate.     Scribed for Josue Hatch MD by Alice Smith.  10/22/24   16:15 EDT    I have personally performed the services described in this document as scribed by the above individual and it is both accurate and complete. Josue Hatch MD 10/22/24

## 2024-10-24 ENCOUNTER — TELEPHONE (OUTPATIENT)
Dept: PHYSICAL THERAPY | Facility: OTHER | Age: 29
End: 2024-10-24
Payer: COMMERCIAL

## 2024-10-24 NOTE — TELEPHONE ENCOUNTER
Caller: Purvi Foreman    Relationship: Self    What was the call regarding: PATIENT CANCELLED APPT TODAY BECAUSE THEY CANT MAKE IT

## 2024-10-25 ENCOUNTER — TELEPHONE (OUTPATIENT)
Dept: ORTHOPEDIC SURGERY | Facility: CLINIC | Age: 29
End: 2024-10-25
Payer: COMMERCIAL

## 2024-10-25 NOTE — TELEPHONE ENCOUNTER
PATIENT STATES HER ANKLE IS GIVING HER 7/10 PAIN THIS MORNING AFTER WORKING LAST NIGHT. IT IS ALSO MORE SWOLLEN. SHE WAS UNABLE TO GET TO PT YESTERDAY DUE TO WORK AND BEING ON CALL. SHE WOULD LIKE HELP RESCHEDULING PT EVAL AND WOULD LIKE TO KNOW WHAT DR. NIEVES ADVISES IN REGARDS TO THE INCREASED PAIN.   SPOKE WITH DR. NIEVES, HE STATES HE IS HAPPY TO SEE HER NEXT WEEK IF NEEDED TO RE-EVALUATE AND DISCUSS POSSIBLE REFERRAL TO DR YUNG, OR PATIENT CAN TRY A FEW VISITS WITH PT TO SEE IF THIS PROVIDES RELIEF PRIOR TO FOLLOWING BACK UP WITH US.   PT IS ABLE TO SEE HER NEXT WEDNESDAY AT 9AM.   PATIENT WOULD LIKE TO TRY PT AND CONTINUE NSAIDS AND ICE/HEAT. SHE WILL CALL IF NO RELIEF AND NEEDS SOONER APPT.

## 2024-10-28 NOTE — TELEPHONE ENCOUNTER
Caller: Purvi Foreman    Relationship: Self    Best call back number: [phone]    What is the best time to reach you: ANY     Who are you requesting to speak with (clinical staff, provider,  specific staff member): YADY     Do you know the name of the person who called: YES     What was the call regarding: PATIENT WANTED TO CALL AND SPEAK TO ATUL REGARDING HOW HER WEEKEND WENT AFTER SPEAKING WITH HER FRIDAY 10/25/2024- PLEASE LEAVE VM IF NO ANSWER

## 2024-10-28 NOTE — TELEPHONE ENCOUNTER
PATIENT CALLED BACK REQUESTING SOONER APPT WITH DR. NIEVES DUE TO ANKLE PAIN AND SWELLING. APPT GIVEN FOR FRIDAY AT 8AM.

## 2024-10-30 ENCOUNTER — TREATMENT (OUTPATIENT)
Dept: PHYSICAL THERAPY | Facility: CLINIC | Age: 29
End: 2024-10-30
Payer: COMMERCIAL

## 2024-10-30 DIAGNOSIS — G89.29 CHRONIC PAIN OF LEFT ANKLE: ICD-10-CM

## 2024-10-30 DIAGNOSIS — R29.898 ANKLE WEAKNESS: ICD-10-CM

## 2024-10-30 DIAGNOSIS — M25.572 CHRONIC PAIN OF LEFT ANKLE: ICD-10-CM

## 2024-10-30 DIAGNOSIS — M77.52 LEFT ANKLE TENDONITIS: Primary | ICD-10-CM

## 2024-10-30 PROCEDURE — 97530 THERAPEUTIC ACTIVITIES: CPT

## 2024-10-30 PROCEDURE — 97161 PT EVAL LOW COMPLEX 20 MIN: CPT

## 2024-10-30 NOTE — PROGRESS NOTES
Physical Therapy Initial Evaluation and Plan of Care                    [facility] [address] [place] [address]    Patient: Purvi Foreman   : 1995  Diagnosis/ICD-10 Code:  Left ankle tendonitis [M77.52]  Referring practitioner: Rigoberto Thomas, *  Date of Initial Visit: 10/30/2024  Today's Date: 10/30/2024  Patient seen for 1 sessions           Subjective Questionnaire: LEFS: 10/80    (12.5% function)      Subjective Evaluation    History of Present Illness  Mechanism of injury: Pt presents to therapy with complaints of chronic L ankle pain. Pt did have a L foot fracture in early September. After this, she was in a walking boot for 4 weeks. She came out of the walking boot the first week of October. Since being in the walking boot, her foot has swelled continuously. Pt was diagnosed with L ankle tendonitis. Pt reports that she has seen orthopedic surgery and podiatry. Ankle does feel better with a lace-up/velcro brace on, however, orthopedic surgeon does not want her to wear it all the same.   Pain was so bad last night that she could not sleep. She even took a left over pain pill from when she had gastric sleeve surgery and she still could not sleep. She has constant tingling, sharp pains in the ankle. The pain radiates up her leg (to a little bit above the knee) to where her leg goes numb. Sometimes the numbness is so bad that she has to pick her leg up with her hands to move it. Sometimes her  has to help pick her up. Sometimes after it goes numb, it throbs and hurts really bad. Pt is still on full duty at work. 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  actually went and got her crutches for when she needs them. She is not ambulating with an AD right now.    Pt was given diclofenac. She rotates that with 800 mg ibuprofen. She props her L ankle up. She elevates it and ices it. She rotates ice and heat.     Recent MRI of L ankle revealed:   1.Mild  tendinopathy and tenosynovitis of all the posterior tibialis tendon.   2.Mild tendinopathy and tenosynovitis involving the peroneus longus and brevis tendons.   3.Mild changes of distal Achilles tendinopathy. There is no Achilles tendon tear.   4.Mild changes of planter fasciitis.     Apparently, orthopedic surgeon said that he also sees fluid on the MRI.    PMH: gestational HTN    Goals: no welling, be able to have full mobility of the ankle, walk fast paced, be her normal self    Pain  Current pain ratin  At best pain rating: 3  At worst pain ratin  Quality: discomfort, dull ache, throbbing, radiating, sharp and knife-like  Relieving factors: ice, heat, change in position and medications  Aggravating factors: lifting, movement, stairs, standing, ambulation, sleeping, prolonged positioning, squatting and repetitive movement    Treatments  Previous treatment: physical therapy  Patient Goals  Patient goals for therapy: decreased pain, improved balance, increased motion, return to sport/leisure activities, independence with ADLs/IADLs and increased strength             Objective          Palpation   Left   Tenderness of the anterior tibialis, lateral gastrocnemius, medial gastrocnemius, peroneus, posterior tibialis and soleus.     Tenderness   Left Ankle/Foot   Tenderness in the anterior ankle, anterior talofibular ligament, calcaneofibular ligament, deltoid ligament, dorsum foot, fibula, lateral malleolus, medial malleolus, peroneal tendon, plantar fascia, posterior tibial tendon, posterior talofibular ligament, talar dome, tarsals and superior tibiofibular ligament.     Active Range of Motion   Left Ankle/Foot   Plantar flexion: 35 degrees   Inversion: 22 degrees   Eversion: 15 degrees     Right Ankle/Foot   Dorsiflexion (kf): 5 degrees   Plantar flexion: 50 degrees   Inversion: 40 degrees   Eversion: 30 degrees     Additional Active Range of Motion Details  L ankle AROM   DF (kf): -20 degrees      Passive  Range of Motion     Additional Passive Range of Motion Details  Extreme pain to passive ROM    Strength/Myotome Testing     Left Ankle/Foot   Dorsiflexion: 2  Left ankle plantar flexion strength: empty.  Left ankle inversion strength: empty.  Left ankle eversion strength: empty.    Right Ankle/Foot   Dorsiflexion: 5  Plantar flexion: 5  Inversion: 5  Eversion: 5    Swelling   Left Ankle/Foot   Malleoli: 27 cm    Right Ankle/Foot   Malleoli: 26 cm      See Exercise, Manual, and Modality Logs for complete treatment.     Assessment & Plan       Assessment  Impairments: abnormal gait, abnormal muscle firing, abnormal or restricted ROM, activity intolerance, impaired balance, impaired physical strength, lacks appropriate home exercise program, pain with function, safety issue and weight-bearing intolerance   Functional limitations: walking, uncomfortable because of pain, moving in bed, standing and stooping   Assessment details: Pt presents to therapy with complaints of L ankle pain after obtaining a stress fracture in her foot a few months ago and then having to wear a boot for 4 weeks. Subjective information and results of objective testing are consistent with MRI-confirmed L ankle tendonitis. If symptoms don't start to improve, CRPS may be considered. Pt has associated L ankle weakness, decreased L ankle ROM and other functional deficits (LEFS). Skilled therapy required in order to address the aforementioned impairments so that she can return to her PLOF with ADL's. Continue POC.     Prognosis: good    Goals  Plan Goals: ANKLE PROBLEMS    1. The patient has limited ROM for the L ankle.   LTG 1: 12 weeks:  In order to allow the patient greater ease with forward, lateral, and diagonal mobility the patient will demonstrate improved ROM of the L ankle as follows:  5 degrees of dorsiflexion, 40 degrees of plantarflexion, 35 degrees of inversion, and 30 degrees of eversion.  STATUS:  New   STG 1a: 6 weeks:  The patient will  demonstrate improved ROM of the L ankle as follows:  -5 degrees of dorsiflexion, 40 degrees of plantarflexion, 30 degrees of inversion, and 20 degrees of eversion.    STATUS:  New    2. The patient has limited strength of the L ankle.   LTG 2: 12 weeks:  In order to provide greater joint stability of the L ankle the patient will demonstrate improved strength of the L ankle as follows:  5/5 dorsiflexion, 5/5 inversion, 5/5 eversion, and 5/5 plantar flexion.    STATUS:  New   STG 2a: 6 weeks:  The patient will demonstrate improved strength of the L ankle as follows:  3+/5 dorsiflexion, 3+/5 inversion, 3+/5 eversion, and 3+/5 plantar flexion.    STATUS:  New    3. The patient has gait dysfunction.   LTG 3: 12 weeks:  The patient will ambulate without assistive device, independently, for community distances with minimal limp to the L lower extremity in order to improve mobility and allow patient to perform activities such as grocery shopping with greater ease.    STATUS:  New   STG 3a: The patient will be independent in HEP.    STATUS:  New    4. The patient complains of L ankle pain.  LTG 4: 12 weeks:  The patient will report a pain rating of 1/10 or better, on average in the past week, in order to improve tolerance to activities of daily living and improve sleep quality.  STATUS:  New  STG 4a: 6 weeks:  The patient will report a pain rating of 4/10 or better, on average in the past week.  STATUS:  New    5. Mobility: Walking/Moving Around Functional Limitation     LTG 5: 12 weeks:  The patient will demonstrate 75% function or better on the Lower Extremity Functional Scale, in order to indicate increased tolerance to standing, walking, putting on shoes, etc.     STATUS:  New   STG 5a: 6 weeks:  The patient will demonstrate 30% function or better on the Lower Extremity Functional Scale.      STATUS:  New       Plan  Therapy options: will be seen for skilled therapy services  Planned modality interventions: cryotherapy,  electrical stimulation/Russian stimulation and TENS  Planned therapy interventions: balance/weight-bearing training, ADL retraining, soft tissue mobilization, strengthening, stretching, therapeutic activities, joint mobilization, home exercise program, gait training, functional ROM exercises, flexibility, body mechanics training, postural training, neuromuscular re-education and manual therapy  Frequency: 3x week  Duration in weeks: 12  Treatment plan discussed with: patient        Visit Diagnoses:    ICD-10-CM ICD-9-CM   1. Left ankle tendonitis  M77.52 727.06   2. Chronic pain of left ankle  M25.572 719.47    G89.29 338.29   3. Ankle weakness  R29.898 719.67       History # of Personal Factors and/or Comorbidities: LOW (0)  Examination of Body System(s): # of elements: LOW (1-2)  Clinical Presentation: STABLE   Clinical Decision Making: LOW       Timed:         Manual Therapy:    0     mins  68808;     Therapeutic Exercise:    0     mins  60879;     Neuromuscular Varun:    0    mins  30258;    Therapeutic Activity:     10     mins  84679;     Gait Trainin     mins  56261;     Ultrasound:     0     mins  65764;    Ionto                               0    mins   74323  Self Care                       0     mins   78756  Canalith Repos    0     mins 73283      Un-Timed:  Electrical Stimulation:    0     mins  96763 ( );  Dry Needling     0     mins self-pay  Traction     0     mins 04280  Low Eval     30     Mins  26932  Mod Eval     0     Mins  71226  High Eval                       0     Mins  74673  Re-Eval                           0    mins  20827    Timed Treatment:   10   mins   Total Treatment:     40   mins    PT SIGNATURE: Janae Tate PT    Electronically signed 10/30/2024    [place] License: PT - [id number]      Initial Certification  Certification Period: 10/30/2024 thru 2025  I certify that the therapy services are furnished while this patient is under my care.  The services outlined above  are required by this patient, and will be reviewed every 90 days.     PHYSICIAN: Rigoberto Thomas DPM  NPI: [id number]      DATE:     Please sign and return via fax to [phone]. Thank you, [facility].

## 2024-11-01 ENCOUNTER — OFFICE VISIT (OUTPATIENT)
Dept: ORTHOPEDIC SURGERY | Facility: CLINIC | Age: 29
End: 2024-11-01
Payer: COMMERCIAL

## 2024-11-01 VITALS
HEART RATE: 84 BPM | WEIGHT: 234 LBS | OXYGEN SATURATION: 98 % | DIASTOLIC BLOOD PRESSURE: 74 MMHG | SYSTOLIC BLOOD PRESSURE: 113 MMHG | BODY MASS INDEX: 32.76 KG/M2 | HEIGHT: 71 IN

## 2024-11-01 DIAGNOSIS — N76.0 BV (BACTERIAL VAGINOSIS): Primary | ICD-10-CM

## 2024-11-01 DIAGNOSIS — M25.472 LEFT ANKLE SWELLING: ICD-10-CM

## 2024-11-01 DIAGNOSIS — N93.9 ABNORMAL UTERINE BLEEDING (AUB): Primary | ICD-10-CM

## 2024-11-01 DIAGNOSIS — M77.52 TENDONITIS OF ANKLE, LEFT: ICD-10-CM

## 2024-11-01 DIAGNOSIS — B96.89 BV (BACTERIAL VAGINOSIS): Primary | ICD-10-CM

## 2024-11-01 DIAGNOSIS — M77.52 TENDONITIS OF ANKLE, LEFT: Primary | ICD-10-CM

## 2024-11-01 LAB
BASOPHILS # BLD AUTO: 0.03 10*3/MM3 (ref 0–0.2)
BASOPHILS NFR BLD AUTO: 0.7 % (ref 0–1.5)
CRP SERPL-MCNC: 1.66 MG/DL (ref 0–0.5)
DEPRECATED RDW RBC AUTO: 41 FL (ref 37–54)
EOSINOPHIL # BLD AUTO: 0.18 10*3/MM3 (ref 0–0.4)
EOSINOPHIL NFR BLD AUTO: 3.9 % (ref 0.3–6.2)
ERYTHROCYTE [DISTWIDTH] IN BLOOD BY AUTOMATED COUNT: 12.8 % (ref 12.3–15.4)
ERYTHROCYTE [SEDIMENTATION RATE] IN BLOOD: 10 MM/HR (ref 0–20)
HCT VFR BLD AUTO: 40.2 % (ref 34–46.6)
HGB BLD-MCNC: 12.7 G/DL (ref 12–15.9)
IMM GRANULOCYTES # BLD AUTO: 0 10*3/MM3 (ref 0–0.05)
IMM GRANULOCYTES NFR BLD AUTO: 0 % (ref 0–0.5)
LYMPHOCYTES # BLD AUTO: 1.55 10*3/MM3 (ref 0.7–3.1)
LYMPHOCYTES NFR BLD AUTO: 33.8 % (ref 19.6–45.3)
MCH RBC QN AUTO: 27.7 PG (ref 26.6–33)
MCHC RBC AUTO-ENTMCNC: 31.6 G/DL (ref 31.5–35.7)
MCV RBC AUTO: 87.6 FL (ref 79–97)
MONOCYTES # BLD AUTO: 0.27 10*3/MM3 (ref 0.1–0.9)
MONOCYTES NFR BLD AUTO: 5.9 % (ref 5–12)
NEUTROPHILS NFR BLD AUTO: 2.55 10*3/MM3 (ref 1.7–7)
NEUTROPHILS NFR BLD AUTO: 55.7 % (ref 42.7–76)
NRBC BLD AUTO-RTO: 0 /100 WBC (ref 0–0.2)
PLATELET # BLD AUTO: 243 10*3/MM3 (ref 140–450)
PMV BLD AUTO: 10.6 FL (ref 6–12)
PROLACTIN SERPL-MCNC: 6.91 NG/ML (ref 4.79–23.3)
RBC # BLD AUTO: 4.59 10*6/MM3 (ref 3.77–5.28)
T4 FREE SERPL-MCNC: 1.37 NG/DL (ref 0.92–1.68)
TSH SERPL DL<=0.05 MIU/L-ACNC: 1.56 UIU/ML (ref 0.27–4.2)
URATE SERPL-MCNC: 4.6 MG/DL (ref 2.4–5.7)
WBC NRBC COR # BLD AUTO: 4.58 10*3/MM3 (ref 3.4–10.8)

## 2024-11-01 PROCEDURE — 85652 RBC SED RATE AUTOMATED: CPT | Performed by: ORTHOPAEDIC SURGERY

## 2024-11-01 PROCEDURE — 84439 ASSAY OF FREE THYROXINE: CPT | Performed by: OBSTETRICS & GYNECOLOGY

## 2024-11-01 PROCEDURE — 84550 ASSAY OF BLOOD/URIC ACID: CPT | Performed by: OBSTETRICS & GYNECOLOGY

## 2024-11-01 PROCEDURE — 84443 ASSAY THYROID STIM HORMONE: CPT | Performed by: OBSTETRICS & GYNECOLOGY

## 2024-11-01 PROCEDURE — 86140 C-REACTIVE PROTEIN: CPT | Performed by: OBSTETRICS & GYNECOLOGY

## 2024-11-01 PROCEDURE — 85025 COMPLETE CBC W/AUTO DIFF WBC: CPT | Performed by: ORTHOPAEDIC SURGERY

## 2024-11-01 PROCEDURE — 84146 ASSAY OF PROLACTIN: CPT | Performed by: OBSTETRICS & GYNECOLOGY

## 2024-11-01 RX ORDER — METOCLOPRAMIDE 10 MG/1
TABLET ORAL
COMMUNITY
Start: 2024-10-05

## 2024-11-01 RX ORDER — METRONIDAZOLE 500 MG/1
500 TABLET ORAL 2 TIMES DAILY
Qty: 14 TABLET | Refills: 0 | Status: SHIPPED | OUTPATIENT
Start: 2024-11-01 | End: 2024-11-08

## 2024-11-01 NOTE — PROGRESS NOTES
VENIPUNCTURE PERFORMED IN [facility] BY Loree Alvares WITH GOOD HEMOSTASIS.PATIENT TOLERATED WELL. 11/01/24     Loree Alvares

## 2024-11-01 NOTE — PROGRESS NOTES
"Chief Complaint  Pain and Follow-up of the Left Ankle (Mri and xray in chart)     Subjective      Purvi Foreman presents to [facility] for a follow up for her left ankle. She states her left ankle has been progressing and has swelling to her ankle. She has been attending physical therapy and has pain with range of motion. She states her swelling is persistent. To review, her left ankle has been bothering her since the beginning of September. She reports her left leg went numb and when she got up and injured her left ankle and foot. She saw Dr. Dee where she was placed in a walking boot and had an MRI on her ankle where she was told she had a stress fracture. She has been taking an oral steroid and diclofenac     Allergies   Allergen Reactions    Cephalexin Anaphylaxis    Penicillins Swelling and Rash    Latex Rash        Social History     Socioeconomic History    Marital status:    Tobacco Use    Smoking status: Never    Smokeless tobacco: Never   Vaping Use    Vaping status: Never Used   Substance and Sexual Activity    Alcohol use: Yes     Comment: drinks rarely    Drug use: Never    Sexual activity: Yes     Partners: Male     Birth control/protection: I.U.D.     Comment: Mirena insertion 04/04/23        I reviewed the patient's chief complaint, history of present illness, review of systems, past medical history, surgical history, family history, social history, medications, and allergy list.     Review of Systems     Constitutional: Denies fevers, chills, weight loss  Cardiovascular: Denies chest pain, shortness of breath  Skin: Denies rashes, acute skin changes  Neurologic: Denies headache, loss of consciousness  MSK: left ankle pain       Vital Signs:   /74   Pulse 84   Ht 179.1 cm (70.5\")   Wt 106 kg (234 lb)   SpO2 98%   BMI 33.10 kg/m²            Ortho Exam    Physical Exam  General:Alert. No acute distress     Left lower extremity; dorsiflexion " to 7 with pain, plantar flexion  to 40 degrees with increase in pain, pain is worse with plantar flexion, mild swelling, calf soft, achilles intact, distal neurovascularly intact, positive EHL, FHL, GS, and TA. Sensation intact to all 5 nerves of the foot.     Procedures      Imaging Results (Most Recent)       None             Result Review :       No results found.           Assessment and Plan     Diagnoses and all orders for this visit:    1. Tendonitis of ankle, left (Primary)    2. Left ankle swelling        The patient presents here today for a follow up for her left ankle.     Referral placed today for a second opinion in [place].     Lab work placed today for a CBC, CRP, ESR and a uric acid.     Advised to continue the brace, elevation and swelling and current medications for pain control.     Call or return if worsening symptoms.    Follow Up     PRN     Patient was given instructions and counseling regarding her condition or for health maintenance advice. Please see specific information pulled into the AVS if appropriate.     Scribed for Josue Hatch MD by Alice Smith.  11/01/24   08:09 EDT    I have personally performed the services described in this document as scribed by the above individual and it is both accurate and complete. Josue Hatch MD 11/01/24

## 2024-11-01 NOTE — PROGRESS NOTES
"GYN Visit    Chief Complaint   Patient presents with    Menorrhagia       HPI:   29 y.o. LMP: Patient's last menstrual period was 10/30/2024.     Social History     Substance and Sexual Activity   Sexual Activity Yes    Partners: Male    Comment: Mirena insertion 22     IGP,rfx Aptima HPV All Pth (2022 09:35) Pap only negative    T4, Free (2024 09:11)  TSH (2024 09:11)  Prolactin (2024 09:11)  CBC & Differential (2024 09:11)      85# lost wt/7mo    Started menses 30Oct.  Heavy, soaking through.  Today just liners, only seen on TP w urination. Now dark blood.  Had cramps and breasts tender.  No unilateral pain.  UHCG here was neg by report. Labs wnl.     Prior to 30Oct no menses (except occas spotting) since May 2023 (pre insert IUD).     Desires pregnancy and IUD removed today    History: PMHx, Meds, Allergies, PSHx, Social Hx, and POBHx all reviewed and updated.    PHYSICAL EXAM:  /85   Pulse 96   Ht 179.1 cm (70.5\")   Wt 104 kg (230 lb)   LMP 10/30/2024   BMI 32.54 kg/m²   Facility age limit for growth %mili is 20 years.   General- NAD, alert and oriented, appropriate  Psych- Normal mood, good memory    Chaperone present during breast and/or pelvic exam if performed.  External genitalia- Normal female, no lesions  Urethra/meatus- Normal, no masses, non tender  Bladder- Normal, no masses, non tender  Vagina- Normal, no atrophy, no lesions, no discharge   Prolapse : none noted   Cervix- Normal, no lesions, no discharge, No cervical motion tenderness, IUD strings visable 2cm, IUD easily removed, intact, and discarded.  Patient tolerated well  Uterus- Normal size, shape & consistency.  Non tender, mobile.   Adnexa- No mass, non tender  Anus/Rectum/Perineum- Not performed    Lymphatic- No palpable groin nodes  Extremities- No edema, no cyanosis    Skin- No lesions, no rashes      ASSESSMENT AND PLAN:  Diagnoses and all orders for this visit:    1. Abnormal uterine " bleeding (AUB) (Primary)  Comments:  Suspect may have been return of ovulation/or OV cyst, devin w large amount of weight loss.  Normal labs. Ultrasound is not necessary unless persists  Overview:  Nov 2024 nl CBC, TFTs and prolactin      2. Patient desires pregnancy  -     Prenatal MV-Min-Fe Fum-FA-DHA (Prenatal Multivitamin + DHA) 28-0.8 & 200 MG misc; Take 1 tablet by mouth Daily.  Dispense: 90 each; Refill: 3    3. Encounter for IUD removal      TRACK MENSES, RTO if <q21 days (frequent) or >q3mo (infrequent IF not on hormonal BC), >7d long, heavy, or painful.        Follow Up:  Return if symptoms return OR w pos preg test.          Gertrude Bower DO  11/05/2024    [facility]  [facility] [place] [address]  Dept: [phone]  Dept Fax: [phone]  Loc: [phone]  Loc Fax: [phone]

## 2024-11-02 PROBLEM — N93.9 ABNORMAL UTERINE BLEEDING (AUB): Status: ACTIVE | Noted: 2024-11-02

## 2024-11-05 ENCOUNTER — OFFICE VISIT (OUTPATIENT)
Dept: OBSTETRICS AND GYNECOLOGY | Facility: CLINIC | Age: 29
End: 2024-11-05
Payer: COMMERCIAL

## 2024-11-05 VITALS
HEART RATE: 96 BPM | DIASTOLIC BLOOD PRESSURE: 85 MMHG | SYSTOLIC BLOOD PRESSURE: 125 MMHG | BODY MASS INDEX: 32.2 KG/M2 | WEIGHT: 230 LBS | HEIGHT: 71 IN

## 2024-11-05 DIAGNOSIS — N93.9 ABNORMAL UTERINE BLEEDING (AUB): Primary | ICD-10-CM

## 2024-11-05 DIAGNOSIS — Z30.432 ENCOUNTER FOR IUD REMOVAL: ICD-10-CM

## 2024-11-05 DIAGNOSIS — Z31.9 PATIENT DESIRES PREGNANCY: ICD-10-CM

## 2024-11-05 RX ORDER — CHOLECALCIFEROL (VITAMIN D3) 50 MCG
1 TABLET ORAL DAILY
Qty: 90 EACH | Refills: 3 | Status: SHIPPED | OUTPATIENT
Start: 2024-11-05

## 2024-11-12 ENCOUNTER — TREATMENT (OUTPATIENT)
Dept: PHYSICAL THERAPY | Facility: CLINIC | Age: 29
End: 2024-11-12
Payer: COMMERCIAL

## 2024-11-12 DIAGNOSIS — M25.572 CHRONIC PAIN OF LEFT ANKLE: ICD-10-CM

## 2024-11-12 DIAGNOSIS — G89.29 CHRONIC PAIN OF LEFT ANKLE: ICD-10-CM

## 2024-11-12 DIAGNOSIS — R29.898 ANKLE WEAKNESS: ICD-10-CM

## 2024-11-12 DIAGNOSIS — M77.52 LEFT ANKLE TENDONITIS: Primary | ICD-10-CM

## 2024-11-12 PROCEDURE — 97530 THERAPEUTIC ACTIVITIES: CPT

## 2024-11-12 PROCEDURE — 97110 THERAPEUTIC EXERCISES: CPT

## 2024-11-12 NOTE — PROGRESS NOTES
Physical Therapy Daily Treatment Note                      [place] [facility] [address] [facility] [place] [address]    Patient: Purvi Foreman   : 1995  Diagnosis/ICD-10 Code:  Left ankle tendonitis [M77.52]  Referring practitioner: Rigoberto Thomas, *  Date of Initial Visit: Type: THERAPY  Noted: 10/30/2024  Today's Date: 2024  Patient seen for 2 sessions           Subjective   The patient reported that she has been doing her HEP. The HEP seems to be helping; she just cannot do the exercises very long. Pt saw new doctor with [facility] and got diagnosed with calcaneofibular ligament sprain on the L. Doctor told her she absolutely needs to be coming to therapy in-office. Pt did take diclofenac today.    Objective   See Exercise, Manual, and Modality Logs for complete treatment.     Assessment/Plan  Pt tolerated today's session well with some discomfort. Pt did have some shooting pains in the L lateral ankle during heel raises. Pt reported that she felt like her ankle was throbbing during Arieso board exercise. Pt's pain continues to be consistent with L ankle tendonitis / sprain of calcaneofibular ligament.Therapist just beginning to address deficits outlined in the initial evaluation, as this is pt's first treatment session. Continue POC to allow pt to return to her PLOF.        Timed:  Manual Therapy:    0     mins  99541;  Therapeutic Exercise:    23     mins  96355;     Neuromuscular Varun:   0    mins  96867;    Therapeutic Activity:     8     mins  89279;     Gait Trainin     mins  95434;     Aquatics                         0      mins  12946    Un-timed:  Mechanical Traction      0     mins  81589  Dry Needling     0     mins self-pay  Electrical Stimulation:    0     mins  01513 ( );      Timed Treatment:   31   mins   Total Treatment:     31   mins    Janae Tate PT    Electronically signed 2024    [place] License: PT - [id number]

## 2024-11-15 ENCOUNTER — TREATMENT (OUTPATIENT)
Dept: PHYSICAL THERAPY | Facility: CLINIC | Age: 29
End: 2024-11-15
Payer: COMMERCIAL

## 2024-11-15 DIAGNOSIS — R29.898 ANKLE WEAKNESS: ICD-10-CM

## 2024-11-15 DIAGNOSIS — M77.52 LEFT ANKLE TENDONITIS: Primary | ICD-10-CM

## 2024-11-15 DIAGNOSIS — M25.572 CHRONIC PAIN OF LEFT ANKLE: ICD-10-CM

## 2024-11-15 DIAGNOSIS — G89.29 CHRONIC PAIN OF LEFT ANKLE: ICD-10-CM

## 2024-11-15 PROCEDURE — 97110 THERAPEUTIC EXERCISES: CPT

## 2024-11-15 PROCEDURE — 97140 MANUAL THERAPY 1/> REGIONS: CPT

## 2024-11-15 PROCEDURE — 97112 NEUROMUSCULAR REEDUCATION: CPT

## 2024-11-15 NOTE — PROGRESS NOTES
Physical Therapy Daily Treatment Note                      [place] [facility] [address] [facility] [address]    Patient: Purvi Foreman   : 1995  Diagnosis/ICD-10 Code:  Left ankle tendonitis [M77.52]  Referring practitioner: Rigoberto Thomas, *  Date of Initial Visit: Type: THERAPY  Noted: 10/30/2024  Today's Date: 11/15/2024  Patient seen for 3 sessions           Subjective   The patient reported that her ankle has been good today. She has been sleeping; she is about to go into work after this. It was a little sore after last PT session.    Objective   See Exercise, Manual, and Modality Logs for complete treatment.     Assessment/Plan  Pt tolerated today's session well with some discomfort. She verbalized that she really feels the gastrocnemius stretch a lot while on the slantboard. Pt tolerated manual treatments well. Pt's pain continues to be consistent with L ankle tendonitis / sprain of calcaneofibular ligament.Therapist just beginning to address deficits outlined in the initial evaluation, as this is pt's first treatment session. Continue POC to allow pt to return to her PLOF.        Timed:  Manual Therapy:    8     mins  24549;  Therapeutic Exercise:    10     mins  75558;     Neuromuscular Varun:   11    mins  29383;    Therapeutic Activity:     0     mins  57375;     Gait Trainin     mins  45632;     Aquatics                         0      mins  74487    Un-timed:  Mechanical Traction      0     mins  97669  Dry Needling     0     mins self-pay  Electrical Stimulation:    0     mins  37101 ( );      Timed Treatment:   29   mins   Total Treatment:     29   mins    Janae Tate PT    Electronically signed 11/15/2024    [place] License: PT - [id number]

## 2024-11-25 ENCOUNTER — TREATMENT (OUTPATIENT)
Dept: PHYSICAL THERAPY | Facility: CLINIC | Age: 29
End: 2024-11-25
Payer: COMMERCIAL

## 2024-11-25 DIAGNOSIS — R29.898 ANKLE WEAKNESS: ICD-10-CM

## 2024-11-25 DIAGNOSIS — M25.572 CHRONIC PAIN OF LEFT ANKLE: ICD-10-CM

## 2024-11-25 DIAGNOSIS — G89.29 CHRONIC PAIN OF LEFT ANKLE: ICD-10-CM

## 2024-11-25 DIAGNOSIS — M77.52 LEFT ANKLE TENDONITIS: Primary | ICD-10-CM

## 2024-11-25 PROCEDURE — 97110 THERAPEUTIC EXERCISES: CPT | Performed by: PHYSICAL THERAPIST

## 2024-11-25 PROCEDURE — 97140 MANUAL THERAPY 1/> REGIONS: CPT | Performed by: PHYSICAL THERAPIST

## 2024-11-25 PROCEDURE — 97530 THERAPEUTIC ACTIVITIES: CPT | Performed by: PHYSICAL THERAPIST

## 2024-11-25 NOTE — PROGRESS NOTES
"Physical Therapy Daily Treatment Note      Patient: Purvi Foreman   : 1995  Referring practitioner: Rigoberto Thomas, *  Date of Initial Visit: Type: THERAPY  Noted: 10/30/2024  Today's Date: 2024  Patient seen for 4 sessions           Subjective Questionnaire:       Subjective Evaluation    History of Present Illness    Subjective comment: Pt reports 4/10 L ankle pain.  Pt reports having \"a little bit\" of tingling and no numbess.       Objective   See Exercise, Manual, and Modality Logs for complete treatment.       Assessment & Plan       Assessment  Assessment details: Pt tolerated strengthening well and had discomfort with range of motion.  Pt tolerated IASTIM well and reported feeling better after session.  Continue with POC.        Visit Diagnoses:    ICD-10-CM ICD-9-CM   1. Left ankle tendonitis  M77.52 727.06   2. Chronic pain of left ankle  M25.572 719.47    G89.29 338.29   3. Ankle weakness  R29.898 719.67       Progress per Plan of Care and Progress strengthening /stabilization /functional activity           Timed:  Manual Therapy:    8     mins  74545;  Therapeutic Exercise:    14     mins  18733;     Neuromuscular Varun:        mins  04129;    Therapeutic Activity:     8     mins  84351;     Gait Training:           mins  40417;     Ultrasound:          mins  87730;    Electrical Stimulation:         mins  61024 ( );  Aquatic Therapy          mins  19564    Untimed:  Electrical Stimulation:         mins  09466 ( );  Mechanical Traction:         mins  91297;     Timed Treatment:   30   mins   Total Treatment:     30   mins    Electronically signed    Nicky Macdonald PTA  Physical Therapist Assistant    [place] license: [id number]    "

## 2024-12-03 ENCOUNTER — TREATMENT (OUTPATIENT)
Dept: PHYSICAL THERAPY | Facility: CLINIC | Age: 29
End: 2024-12-03
Payer: COMMERCIAL

## 2024-12-03 DIAGNOSIS — M25.572 CHRONIC PAIN OF LEFT ANKLE: ICD-10-CM

## 2024-12-03 DIAGNOSIS — M77.52 LEFT ANKLE TENDONITIS: Primary | ICD-10-CM

## 2024-12-03 DIAGNOSIS — G89.29 CHRONIC PAIN OF LEFT ANKLE: ICD-10-CM

## 2024-12-03 DIAGNOSIS — R29.898 ANKLE WEAKNESS: ICD-10-CM

## 2024-12-03 PROCEDURE — 97110 THERAPEUTIC EXERCISES: CPT

## 2024-12-03 PROCEDURE — 97530 THERAPEUTIC ACTIVITIES: CPT

## 2024-12-03 NOTE — PROGRESS NOTES
Progress Note  [facility] [address] [place] [address]      Patient: Purvi Foreman   : 1995  Diagnosis/ICD-10 Code:  Left ankle tendonitis [M77.52]  Referring practitioner: No ref. provider found  Date of Initial Visit: Type: THERAPY  Noted: 10/30/2024  Today's Date: 12/3/2024  Patient seen for 5 sessions      Subjective:   Subjective Questionnaire: LEFS: 57/80  (71.25% function)   Clinical Progress: improved  Home Program Compliance: Yes  Treatment has included: therapeutic exercise, neuromuscular re-education, manual therapy, therapeutic activity, and gait training    Subjective   Pt reports that she has noticed a difference in her ankle with therapy. She feels like the ankle is getting better. Soreness is more towards the back of her leg (calf) now rather than the front of her ankle joint. Her 12 hours shifts are still painful, but regular daily activities are easier for her. Pt also does her HEP. L ankle pain has been 4/10 on average in the past 7 days.    Objective   Active Range of Motion   Left Ankle/Foot   Dorsiflexion: 0 degrees  Plantar flexion: 41 degrees   Inversion: 30 degrees   Eversion: 15 degrees      Right Ankle/Foot   Dorsiflexion (kf): 5 degrees   Plantar flexion: 50 degrees   Inversion: 40 degrees   Eversion: 30 degrees      Strength/Myotome Testing      Left Ankle/Foot   Dorsiflexion: 4  Left ankle plantar flexion strength: 4+  Left ankle inversion strength: 4+  Left ankle eversion strength: 3+    See Exercise, Manual, and Modality Logs for complete treatment.     Assessment/Plan  Pt tolerated today's session well. Today was reassessment day. Pt has made significant improvements since starting therapy. Pt's L ankle strength, AROM, pain and function have all improved. Pt's score on the LEFS has improved by more than 50% since the initial evaluation, indicating significant improvements in perceived function of the L LE. Pt was able to meet 4 of her goals today, but  she has 6 unmet goals remaining. Pt's L ankle AROM and strength are still not equal to that of the R ankle. She is most limited with eversion when it comes to strength and ROM. Skilled therapy required in order to address the aforementioned impairments so that she can return to her PLOF with ADL's, such as ambulating with a normal gait. Continue POC.     Goals  Plan Goals: ANKLE PROBLEMS     1. The patient has limited ROM for the L ankle.              LTG 1: 12 weeks:  In order to allow the patient greater ease with forward, lateral, and diagonal mobility the patient will demonstrate improved ROM of the L ankle as follows:  5 degrees of dorsiflexion, 40 degrees of plantarflexion, 35 degrees of inversion, and 30 degrees of eversion.  STATUS:  progressing               STG 1a: 6 weeks:  The patient will demonstrate improved ROM of the L ankle as follows:  -5 degrees of dorsiflexion, 40 degrees of plantarflexion, 30 degrees of inversion, and 20 degrees of eversion.                          STATUS:  progressing      2. The patient has limited strength of the L ankle.              LTG 2: 12 weeks:  In order to provide greater joint stability of the L ankle the patient will demonstrate improved strength of the L ankle as follows:  5/5 dorsiflexion, 5/5 inversion, 5/5 eversion, and 5/5 plantar flexion.                          STATUS:  progressing               STG 2a: 6 weeks:  The patient will demonstrate improved strength of the L ankle as follows:  3+/5 dorsiflexion, 3+/5 inversion, 3+/5 eversion, and 3+/5 plantar flexion.                          STATUS:  MET     3. The patient has gait dysfunction.              LTG 3: 12 weeks:  The patient will ambulate without assistive device, independently, for community distances with minimal limp to the L lower extremity in order to improve mobility and allow patient to perform activities such as grocery shopping with greater ease.                          STATUS:  progressing                STG 3a: The patient will be independent in HEP.                          STATUS:  MET     4. The patient complains of L ankle pain.  LTG 4: 12 weeks:  The patient will report a pain rating of 1/10 or better, on average in the past week, in order to improve tolerance to activities of daily living and improve sleep quality.  STATUS:  progressing   STG 4a: 6 weeks:  The patient will report a pain rating of 4/10 or better, on average in the past week.  STATUS:  MET     5. Mobility: Walking/Moving Around Functional Limitation                               LTG 5: 12 weeks:  The patient will demonstrate 75% function or better on the Lower Extremity Functional Scale, in order to indicate increased tolerance to standing, walking, putting on shoes, etc.                           STATUS:  progressing               STG 5a: 6 weeks:  The patient will demonstrate 30% function or better on the Lower Extremity Functional Scale.                            STATUS:  MET    Progress toward previous goals: Partially Met      Recommendations: Continue as planned  Timeframe: 1 month  Prognosis to achieve goals: good    Signature: Janae Tate PT    Electronically signed 12/3/2024    [place] License: PT - [id number]      Timed:  Manual Therapy:    0     mins  93578;  Therapeutic Exercise:    9     mins  74001;     Neuromuscular Varun:    0    mins  80561;    Therapeutic Activity:     23     mins  17199;     Gait Trainin     mins  67425;     Aquatics                         0      mins  56049    Un-timed:  Mechanical Traction      0     mins  69613  Dry Needling     0     mins self-pay  Electrical Stimulation:    0     mins  19619 ( );    Timed Treatment:   32   mins   Total Treatment:     32   mins

## 2024-12-05 ENCOUNTER — TELEPHONE (OUTPATIENT)
Dept: PHYSICAL THERAPY | Facility: CLINIC | Age: 29
End: 2024-12-05

## 2024-12-17 ENCOUNTER — TELEPHONE (OUTPATIENT)
Dept: PHYSICAL THERAPY | Facility: OTHER | Age: 29
End: 2024-12-17
Payer: COMMERCIAL

## 2024-12-17 NOTE — TELEPHONE ENCOUNTER
Caller: Purvi Foreman    Relationship: Self    What was the call regarding: DOESNT WANT TO DO PT ANYMORE SO PATIENT CANCELLED ALL APPTS

## 2025-01-31 NOTE — PROGRESS NOTES
"Well Woman Visit    CC: Scheduled annual well gyn visit  Chief Complaint   Patient presents with    Gynecologic Exam       HPI:   29 y.o.   Social History     Substance and Sexual Activity   Sexual Activity Yes    Partners: Male    Birth control/protection: None     IGP,rfx Aptima HPV All Pth (2022 09:35) Pap negative    Progress Notes by Gertrude Bower DO (2024 09:00)    Hx of migraines w aura   Mom w endometrial hyperplasia s/p hyst     Menses:   q 28, lasts 4 days, changes products q 4hrs on heaviest days.   Pain with menses:  None    Pt has no complaints today.    PCP: no recent preventative labs  History: PMHx, Meds, Allergies, PSHx, Social Hx, and POBHx all reviewed and updated.    PHYSICAL EXAM:  /75   Pulse 75   Ht 179.1 cm (70.5\")   Wt 103 kg (226 lb)   LMP 2025   BMI 31.97 kg/m²  Not found.   Chaperone present during breast and/or pelvic exam if performed.  General- NAD, alert and oriented, appropriate  Psych- Normal mood, good memory  Neck- No masses, no thyroid enlargement  CV- Regular rhythm, no murmurs  Resp- CTA to bases, no wheezes  Abdomen- Soft, non distended, non tender, no masses    Breast left-  Bilaterally symmetrical, no masses, non tender, no nipple discharge, no axillary or supraclavicular nodes palpable.    Breast right- Bilaterally symmetrical, no masses, non tender, no nipple discharge, no axillary or supraclavicular nodes palpable.      External genitalia- Normal female, no lesions  Urethra/meatus- Normal, no masses, non tender  Bladder- Normal, no masses, non tender  Vagina- Normal, no atrophy, no lesions, no discharge.  Small vag cyst at 700, soft, NT, 3mm in size, superficial, not cw bartholin    Prolapse : none noted   Cvx- Normal, no lesions, no discharge, No cervical motion tenderness, ectropion  Uterus- Normal size, shape & consistency.  Non tender, mobile.  Adnexa- No mass, non tender  Anus/Rectum/Perineum- Not performed    Lymphatic- No palpable " neck, axillary, or groin nodes  Ext- No edema, no cyanosis    Skin- No lesions, no rashes, no acanthosis nigricans      ASSESSMENT and PLAN:    Diagnoses and all orders for this visit:    1. Well woman exam with routine gynecological exam (Primary)  -     IgP, Aptima HPV; Future  -     Hemoglobin A1c  -     Lipid Panel  -     IgP, Aptima HPV    2. Birth control counseling    3. Pregnancy test negative  -     POC Pregnancy, Urine    4. Patient desires pregnancy  -     Prenatal MV-Min-Fe Fum-FA-DHA (Prenatal Multivitamin + DHA) 28-0.8 & 200 MG misc; Take 1 tablet by mouth Daily.  Dispense: 90 each; Refill: 3        Preventative:  BREAST HEALTH- Monthly self breast exam importance and how to reviewed. MMG and/or MRI (prn) reviewed per society guidelines and her individual history. Screen: Not medically needed  CERVICAL CANCER Screening- Reviewed current ASCCP guidelines for screening w and wo cotest HPV, age specific.  Screen: Updated today  COLON CANCER Screening- Reviewed current medical society guidelines and options.  Screen:  Not medically needed  Importance of WEIGHT MANAGEMENT, nutrition, and exercise reviewed.  Ideal BMI discussed  BONE HEALTH- Reviewed current medical society guidelines and options for testing, calcium and vit D intake.  Weight bearing exercise.  DEXA: Not medically needed  VACCINATIONS Recommended: Covid vaccine, Flu annually, Tdap [id number].  Importance discussed, risk being unvaccinated reviewed.  Questions answered  Smoking status- NON SMOKER/VAPER          She understands the importance of having any ordered tests to be performed in a timely fashion.  The risks of not performing them include, but are not limited to, advanced cancer stages, bone loss from osteoporosis and/or subsequent increase in morbidity and/or mortality.  She is encouraged to review her results online and/or contact or office if she has questions.     Follow Up:  Return in about 1 year (around 2/3/2026) for WWE and call  w pos preg test.            Gertrude Bower DO  02/03/2025    [facility]  [address] [place] [address]  Dept: [phone]  Dept Fax: [phone]  Loc: [phone]  Loc Fax: [phone]

## 2025-02-03 ENCOUNTER — OFFICE VISIT (OUTPATIENT)
Dept: OBSTETRICS AND GYNECOLOGY | Facility: CLINIC | Age: 30
End: 2025-02-03
Payer: COMMERCIAL

## 2025-02-03 VITALS
HEIGHT: 71 IN | HEART RATE: 75 BPM | BODY MASS INDEX: 31.64 KG/M2 | SYSTOLIC BLOOD PRESSURE: 115 MMHG | DIASTOLIC BLOOD PRESSURE: 75 MMHG | WEIGHT: 226 LBS

## 2025-02-03 DIAGNOSIS — Z30.09 BIRTH CONTROL COUNSELING: ICD-10-CM

## 2025-02-03 DIAGNOSIS — Z01.419 WELL WOMAN EXAM WITH ROUTINE GYNECOLOGICAL EXAM: Primary | ICD-10-CM

## 2025-02-03 DIAGNOSIS — Z31.9 PATIENT DESIRES PREGNANCY: ICD-10-CM

## 2025-02-03 DIAGNOSIS — Z32.02 PREGNANCY TEST NEGATIVE: ICD-10-CM

## 2025-02-03 LAB
B-HCG UR QL: NEGATIVE
CHOLEST SERPL-MCNC: 151 MG/DL (ref 0–200)
EXPIRATION DATE: NORMAL
HBA1C MFR BLD: 4.6 % (ref 4.8–5.6)
HDLC SERPL-MCNC: 44 MG/DL (ref 40–60)
INTERNAL NEGATIVE CONTROL: NORMAL
INTERNAL POSITIVE CONTROL: NORMAL
LDLC SERPL CALC-MCNC: 87 MG/DL (ref 0–100)
LDLC/HDLC SERPL: 1.93 {RATIO}
Lab: NORMAL
TRIGL SERPL-MCNC: 111 MG/DL (ref 0–150)
VLDLC SERPL-MCNC: 20 MG/DL (ref 5–40)

## 2025-02-03 PROCEDURE — 83036 HEMOGLOBIN GLYCOSYLATED A1C: CPT | Performed by: OBSTETRICS & GYNECOLOGY

## 2025-02-03 PROCEDURE — G0123 SCREEN CERV/VAG THIN LAYER: HCPCS | Performed by: OBSTETRICS & GYNECOLOGY

## 2025-02-03 PROCEDURE — 87624 HPV HI-RISK TYP POOLED RSLT: CPT | Performed by: OBSTETRICS & GYNECOLOGY

## 2025-02-03 PROCEDURE — 80061 LIPID PANEL: CPT | Performed by: OBSTETRICS & GYNECOLOGY

## 2025-02-03 RX ORDER — CHOLECALCIFEROL (VITAMIN D3) 50 MCG
1 TABLET ORAL DAILY
Qty: 90 EACH | Refills: 3 | Status: SHIPPED | OUTPATIENT
Start: 2025-02-03

## 2025-02-06 LAB
CYTOLOGIST CVX/VAG CYTO: NORMAL
CYTOLOGY CVX/VAG DOC CYTO: NORMAL
CYTOLOGY CVX/VAG DOC THIN PREP: NORMAL
DX ICD CODE: NORMAL
HPV I/H RISK 4 DNA CVX QL PROBE+SIG AMP: NEGATIVE
OTHER STN SPEC: NORMAL
SERVICE CMNT-IMP: NORMAL
STAT OF ADQ CVX/VAG CYTO-IMP: NORMAL

## 2025-02-10 ENCOUNTER — TELEPHONE (OUTPATIENT)
Dept: OBSTETRICS AND GYNECOLOGY | Facility: CLINIC | Age: 30
End: 2025-02-10
Payer: COMMERCIAL

## 2025-02-10 NOTE — TELEPHONE ENCOUNTER
I have spoken with the patient about her blood test results and the result of her pap smear.  She voiced understanding of all the information provided.

## 2025-02-17 ENCOUNTER — TELEPHONE (OUTPATIENT)
Dept: OBSTETRICS AND GYNECOLOGY | Facility: CLINIC | Age: 30
End: 2025-02-17
Payer: COMMERCIAL

## 2025-02-17 DIAGNOSIS — Z32.01 POSITIVE PREGNANCY TEST: Primary | ICD-10-CM

## 2025-02-17 NOTE — TELEPHONE ENCOUNTER
Patient called with a + home pregnancy test. LMP was 1/21and was due to start her cycle today, positive test yesterday. Any orders or recommendations?

## 2025-02-18 DIAGNOSIS — Z32.01 POSITIVE PREGNANCY TEST: ICD-10-CM

## 2025-02-18 LAB — HCG INTACT+B SERPL-ACNC: 40.78 MIU/ML

## 2025-02-18 PROCEDURE — 84702 CHORIONIC GONADOTROPIN TEST: CPT | Performed by: OBSTETRICS & GYNECOLOGY

## 2025-02-20 DIAGNOSIS — Z32.01 POSITIVE PREGNANCY TEST: ICD-10-CM

## 2025-02-20 LAB — HCG INTACT+B SERPL-ACNC: 123.6 MIU/ML

## 2025-02-20 PROCEDURE — 84702 CHORIONIC GONADOTROPIN TEST: CPT | Performed by: OBSTETRICS & GYNECOLOGY

## 2025-02-21 ENCOUNTER — TELEPHONE (OUTPATIENT)
Dept: OBSTETRICS AND GYNECOLOGY | Facility: CLINIC | Age: 30
End: 2025-02-21
Payer: COMMERCIAL

## 2025-02-21 DIAGNOSIS — R11.2 NAUSEA AND VOMITING, UNSPECIFIED VOMITING TYPE: Primary | ICD-10-CM

## 2025-02-21 RX ORDER — ONDANSETRON 4 MG/1
4 TABLET, FILM COATED ORAL DAILY PRN
Qty: 30 TABLET | Refills: 1 | Status: SHIPPED | OUTPATIENT
Start: 2025-02-21 | End: 2026-02-21

## 2025-02-21 NOTE — TELEPHONE ENCOUNTER
Pt requesting Zofran Rx.  It is the only med she has used in the past that helps.  Per protocol, I have sent Zofran 4 mg 1 po q 6 hrs prn #30 with 2 RF.

## 2025-02-24 DIAGNOSIS — Z32.01 POSITIVE PREGNANCY TEST: ICD-10-CM

## 2025-02-24 LAB — HCG INTACT+B SERPL-ACNC: 765.2 MIU/ML

## 2025-02-24 PROCEDURE — 84702 CHORIONIC GONADOTROPIN TEST: CPT | Performed by: OBSTETRICS & GYNECOLOGY

## 2025-03-03 RX ORDER — OSELTAMIVIR PHOSPHATE 75 MG/1
75 CAPSULE ORAL 2 TIMES DAILY
Qty: 10 CAPSULE | Refills: 0 | Status: SHIPPED | OUTPATIENT
Start: 2025-03-03

## 2025-03-11 NOTE — PROGRESS NOTES
GYN Visit    Chief Complaint   Patient presents with    Viability     Severe nausea, zofran is not helping.       HPI:   30 y.o. LMP: Patient's last menstrual period was 2025 (exact date).     Social History     Substance and Sexual Activity   Sexual Activity Yes    Partners: Male    Birth control/protection: None       hCG, Quantitative, Pregnancy (2025 08:35)  Component  Ref Range & Units (hover) 2 wk ago  (25) 3 wk ago  (25) 3 wk ago  (25) 11 mo ago  (3/19/24) 6 yr ago  (19)   HCG Quantitative 765.20 123.60 40.78 <0.50 54248.0 High  R, CM   Resulting Agency  [facility]               today: viable IUP, final LUIS M 10/28 by LMP    LMP: 25  VB:  none  N/V:  yes    S/p gastric sleeve  Hx GHTN  Anxiety/Depression- no meds, doxylamine prn today Rx.   Prior us Dr. Robles counseling and Wellbutrin XL, Zoloft  Hx sec tri loss-  T13     Wants nips wo gender next OV     History: PMHx, Meds, Allergies, PSHx, Social Hx, and POBHx all reviewed and updated.    PHYSICAL EXAM:  /70   Wt 99.8 kg (220 lb)   LMP 2025 (Exact Date)   BMI 31.12 kg/m²   Facility age limit for growth %mili is 20 years.   Chaperone present during breast and/or pelvic exam if performed.  General- NAD, alert and oriented, appropriate  Psych- Normal mood, good memory        ASSESSMENT AND PLAN:  Diagnoses and all orders for this visit:    1. Viable pregnancy in first trimester (Primary)    2. Nausea and vomiting, unspecified vomiting type  Overview:  Zofran 4mg PO helps some    Orders:  -     ondansetron ODT (ZOFRAN-ODT) 4 MG disintegrating tablet; Place 1 tablet on the tongue Every 8 (Eight) Hours As Needed for Nausea or Vomiting.  Dispense: 30 tablet; Refill: 1  -     promethazine (PHENERGAN) 25 MG tablet; Take 1 tablet by mouth Every 6 (Six) Hours As Needed for Nausea or Vomiting.  Dispense: 30 tablet; Refill: 1  -     vitamin B-6 (PYRIDOXINE) 25 MG tablet; Take 1  tablet by mouth 2 (Two) Times a Day. For nausea in pregnancy  Dispense: 60 tablet; Refill: 1  -     doxylamine (UNISOM) 25 MG tablet; Take 0.5 tablets by mouth 2 (Two) Times a Day As Needed for Nausea (or anxiety). OR TAKE FULL TABLET 25MG AT BEDTIME TO HELP SLEEP  Dispense: 30 tablet; Refill: 1    3. Dysuria  -     POC Urinalysis Dipstick  -     Urine Culture - Urine, Urine, Clean Catch; Future  -     Urine Culture - Urine, Urine, Clean Catch      FIRST TRIMESTER precautions provided- return to office/ER for pain and/or vaginal bleeding.      Follow Up:  Return in about 4 weeks (around 4/10/2025) for New OB OV, then OV 4weeks later.          Gertrude Bower DO  03/13/2025    [facility]  [address] [place] [address]  Dept: [phone]  Dept Fax: [phone]  Loc: [phone]  Loc Fax: [phone]

## 2025-03-13 ENCOUNTER — OFFICE VISIT (OUTPATIENT)
Dept: OBSTETRICS AND GYNECOLOGY | Facility: CLINIC | Age: 30
End: 2025-03-13
Payer: COMMERCIAL

## 2025-03-13 VITALS — SYSTOLIC BLOOD PRESSURE: 118 MMHG | DIASTOLIC BLOOD PRESSURE: 70 MMHG | WEIGHT: 220 LBS | BODY MASS INDEX: 31.12 KG/M2

## 2025-03-13 DIAGNOSIS — Z34.91 VIABLE PREGNANCY IN FIRST TRIMESTER: Primary | ICD-10-CM

## 2025-03-13 DIAGNOSIS — R11.2 NAUSEA AND VOMITING, UNSPECIFIED VOMITING TYPE: ICD-10-CM

## 2025-03-13 DIAGNOSIS — R30.0 DYSURIA: ICD-10-CM

## 2025-03-13 PROBLEM — K44.9 HIATAL HERNIA: Status: RESOLVED | Noted: 2024-02-22 | Resolved: 2025-03-13

## 2025-03-13 PROBLEM — R68.82 LOW LIBIDO: Status: RESOLVED | Noted: 2023-01-13 | Resolved: 2025-03-13

## 2025-03-13 PROBLEM — Z34.80 SUPERVISION OF OTHER NORMAL PREGNANCY, ANTEPARTUM: Status: ACTIVE | Noted: 2025-03-13

## 2025-03-13 LAB
BILIRUB BLD-MCNC: NEGATIVE MG/DL
GLUCOSE UR STRIP-MCNC: NEGATIVE MG/DL
KETONES UR QL: ABNORMAL
LEUKOCYTE EST, POC: ABNORMAL
NITRITE UR-MCNC: NEGATIVE MG/ML
PH UR: 5 [PH] (ref 5–8)
PROT UR STRIP-MCNC: ABNORMAL MG/DL
RBC # UR STRIP: NEGATIVE /UL
SP GR UR: 1.02 (ref 1–1.03)
UROBILINOGEN UR QL: NORMAL

## 2025-03-13 PROCEDURE — 87086 URINE CULTURE/COLONY COUNT: CPT | Performed by: OBSTETRICS & GYNECOLOGY

## 2025-03-13 RX ORDER — ONDANSETRON 4 MG/1
4 TABLET, ORALLY DISINTEGRATING ORAL EVERY 8 HOURS PRN
Qty: 30 TABLET | Refills: 1 | Status: SHIPPED | OUTPATIENT
Start: 2025-03-13

## 2025-03-13 RX ORDER — DIPHENHYDRAMINE HYDROCHLORIDE 25 MG/1
25 CAPSULE ORAL 2 TIMES DAILY
Qty: 60 TABLET | Refills: 1 | Status: SHIPPED | OUTPATIENT
Start: 2025-03-13

## 2025-03-13 RX ORDER — PROMETHAZINE HYDROCHLORIDE 25 MG/1
25 TABLET ORAL EVERY 6 HOURS PRN
Qty: 30 TABLET | Refills: 1 | Status: SHIPPED | OUTPATIENT
Start: 2025-03-13

## 2025-03-14 LAB — BACTERIA SPEC AEROBE CULT: NORMAL

## 2025-03-17 ENCOUNTER — TELEPHONE (OUTPATIENT)
Dept: OBSTETRICS AND GYNECOLOGY | Facility: CLINIC | Age: 30
End: 2025-03-17
Payer: COMMERCIAL

## 2025-03-17 NOTE — TELEPHONE ENCOUNTER
Patient had questions regarding her results and if she needed treatment for a UTI. Advised no recommendations from her provider for treatment and looks suggestive of dehydration. Recommended increasing her water intake. Patient voiced understanding.

## 2025-04-02 ENCOUNTER — TELEPHONE (OUTPATIENT)
Dept: OBSTETRICS AND GYNECOLOGY | Age: 30
End: 2025-04-02
Payer: COMMERCIAL

## 2025-04-02 NOTE — TELEPHONE ENCOUNTER
Patient is 10 weeks and taking zofran, phenergan, B-6 and unisom. Still having nausea and vomiting. Urine dip shows 1+ Leukocytes, 1.025 Specific Gravity, Trace Protein and 1+ Ketones. Normally sees Dr. Bower but she is out on vacation. Please advise as DOTD.

## 2025-04-02 NOTE — TELEPHONE ENCOUNTER
Patient is asking if she just needs to head on over or if you will place orders for her to receive these?   She is one of our registrars, just FYI.

## 2025-04-09 ENCOUNTER — TELEPHONE (OUTPATIENT)
Dept: OBSTETRICS AND GYNECOLOGY | Age: 30
End: 2025-04-09
Payer: COMMERCIAL

## 2025-04-09 ENCOUNTER — OFFICE VISIT (OUTPATIENT)
Dept: OBSTETRICS AND GYNECOLOGY | Age: 30
End: 2025-04-09
Payer: COMMERCIAL

## 2025-04-09 VITALS
HEIGHT: 71 IN | DIASTOLIC BLOOD PRESSURE: 89 MMHG | WEIGHT: 225 LBS | HEART RATE: 111 BPM | BODY MASS INDEX: 31.5 KG/M2 | SYSTOLIC BLOOD PRESSURE: 133 MMHG

## 2025-04-09 DIAGNOSIS — R11.2 NAUSEA AND VOMITING, UNSPECIFIED VOMITING TYPE: Primary | ICD-10-CM

## 2025-04-09 LAB
BILIRUB BLD-MCNC: ABNORMAL MG/DL
GLUCOSE UR STRIP-MCNC: NEGATIVE MG/DL
KETONES UR QL: ABNORMAL
LEUKOCYTE EST, POC: NEGATIVE
NITRITE UR-MCNC: NEGATIVE MG/ML
PH UR: 5 [PH] (ref 5–8)
PROT UR STRIP-MCNC: ABNORMAL MG/DL
RBC # UR STRIP: NEGATIVE /UL
SP GR UR: 1.03 (ref 1–1.03)
UROBILINOGEN UR QL: NORMAL

## 2025-04-09 RX ORDER — ONDANSETRON 4 MG/1
4 TABLET, ORALLY DISINTEGRATING ORAL EVERY 8 HOURS PRN
Qty: 30 TABLET | Refills: 1 | Status: SHIPPED | OUTPATIENT
Start: 2025-04-09

## 2025-04-09 RX ORDER — METOCLOPRAMIDE 10 MG/1
10 TABLET ORAL 4 TIMES DAILY PRN
Qty: 30 TABLET | Refills: 1 | Status: SHIPPED | OUTPATIENT
Start: 2025-04-09

## 2025-04-09 RX ORDER — PROMETHAZINE HYDROCHLORIDE 25 MG/1
25 TABLET ORAL EVERY 6 HOURS PRN
Qty: 30 TABLET | Refills: 1 | Status: SHIPPED | OUTPATIENT
Start: 2025-04-09

## 2025-04-09 NOTE — TELEPHONE ENCOUNTER
Patient's pharmacy called wanting to confirm patient was to receive both phenergan and reglan. Spoke to Dr. Bower and aware of interaction risk as is patient. Pharmacy aware okay to fill medications as prescribed.

## 2025-04-09 NOTE — PROGRESS NOTES
"OB Problem visit      Chief Complaint   Patient presents with    Morning Sickness     Extreme Nausea and vomiting        Subjective:   Everything comes up.  Drinking fluids    Abd cramps, no VB  Exhausted  Had w prior preg up to 20weeks    Objective:  /89   Pulse 111   Ht 179.1 cm (70.5\")   Wt 102 kg (225 lb)   LMP 01/21/2025 (Exact Date)   BMI 31.83 kg/m²  Not found. Body mass index is 31.83 kg/m².    DT unable to obtain, BSUS       Assessment and Plan:  11w1d     Diagnoses and all orders for this visit:    1. Nausea and vomiting, unspecified vomiting type (Primary)  Overview:  Vit B6  Unisom- stopped, makes sleepy  Zofran 4mg q6hrs scheduled  Phenergan 25mg PO qhs    Orders:  -     POC Urinalysis Dipstick  -     ondansetron ODT (ZOFRAN-ODT) 4 MG disintegrating tablet; Place 1 tablet on the tongue Every 8 (Eight) Hours As Needed for Nausea or Vomiting.  Dispense: 30 tablet; Refill: 1  -     promethazine (PHENERGAN) 25 MG tablet; Take 1 tablet by mouth Every 6 (Six) Hours As Needed for Nausea or Vomiting.  Dispense: 30 tablet; Refill: 1  -     metoclopramide (REGLAN) 10 MG tablet; Take 1 tablet by mouth 4 (Four) Times a Day As Needed (nausea and vomiting).  Dispense: 30 tablet; Refill: 1      Counseling:    NAUSEA AND VOMITING in pregnancy discussed.  Recommend bland foods to include the BRAT (Bananas, Rice, Applesauce, Toast) diet, ginger ale, ginger snaps, Zup.  Try to avoid protein, milk products, anything spicy or gas producing, vegetables.  Those are harder to digest and can cause an upset stomach.  Avoid large meals and eat mostly easily digestible carbohydrates (toast, crackers, etc).   Off work, ok return Fri    Discussed IVF option today, she declines, rec if unable to rocky po and above meds dont work will need  IVF.  Option add benadryl or unisom at hs, will need to be careful w sleepiness assoc w phenergan/antihistamine combo      Return for Keep New OB OV as scheduled Mon " 14Apr.            Gertrude Bower DO  04/09/2025    [facility] [address] [facility]  [address] [place] [address] [place] [address]  Dept: [phone]  Dept Fax: [phone]  Loc: [phone]  Loc Fax: [phone]

## 2025-04-11 NOTE — PROGRESS NOTES
OB Initial Visit    CC- Here for care of current pregnancy, first visit  Chief Complaint   Patient presents with    Initial Prenatal Visit     Subjective:  30 y.o. presenting for her first obstetrical visit.    LMP: Patient's last menstrual period was 01/21/2025 (exact date).     US Ob < 14 Weeks Single or First Gestation (03/13/2025 13:32)  Hemoglobin A1c (02/03/2025 09:32) 4.6  IgP, Aptima HPV (02/03/2025 09:21) Pap negative, HPV negative    Denies any complaints, is doing well. N/V much better w Reglan.    VZV immune: prior VZV vaccine  Prior OBSTETRIC history is significant for: GHTN      Reviewed and updated:  OBHx, GYNHx (STDs), PMHx, Medications, Allergies, PSHx, Social Hx, Preventative Hx (PAP), Vaccine Hx, Genetic Hx (pt, FOB, both families).        Objective:  /81   Wt 102 kg (225 lb)   LMP 01/21/2025 (Exact Date)   BMI 31.83 kg/m²    Chaperone present during breast and/or pelvic exam if performed.  General- NAD, alert and oriented, appropriate  Psych- Normal mood, good memory  Neck- No masses, no thyroid enlargement  CV- Regular rhythm, no murmurs  Resp- CTA to bases, no wheezes  Abdomen- Soft, non distended, non tender, no masses    Breast left- No mass, non tender, no nipple discharge, no axillary or supraclavicular nodes palpable.    Breast right- No mass, non tender, no nipple discharge, no axillary or supraclavicular nodes palpable.       External genitalia- Normal, no lesions  Urethra- Normal, no masses, non tender  Vagina- Normal, white curdy discharge  Bladder- Normal, no masses, non tender  Cervix- Normal, no lesions, no discharge, no CMT  Uterus- Consistent with dates, Bedside US consistent with dates.  -160.   Adnexa- Normal, no mass, non tender    Lymphatic- No palpable neck, axillary, or groin nodes  Extremities- No edema, no cyanosis    Skin- No lesions, no rashes    Assessment and Plan:  11w6d   Diagnoses and all orders for this visit:    1. Supervision of other normal  pregnancy, antepartum (Primary)  Overview:  LUIS M finalized: 10/28/2025 by LMP and 6-week ultrasound    Optional testing NIPS,CF/SMA,AFP: CF neg 2020.  NIPS without gender 4/14/2025     COVID vaccine: Recommended 4/14/2025   Flu vaccine: Vaccinated 2024-25  Tdap vaccine:    1hr Glucola:  NA, will plan BS checks  FU RPR w glucola:  ? Desires Sterilization:    Anatomy US:  FU US:    PROBLEM LIST/PLAN:   S/p gastric sleeve-avoid Glucola, check blood sugars 4 times daily 24 to 28 weeks.  Check vitamin levels at new OB visit and q tri   4/14/2025 hga1c, depending on results, start checking BS x1week  Hx GHTN-check CMP and urine PC at new OB visit.    Rx baby aspirin at 12 weeks 4/14/2025   Anxiety/Depression- Doxylamine prn- continue.   Stable  Prior BH Dr. Robles counseling and Wellbutrin XL, Zoloft  Hx sec tri loss-  T13, plan NIPS    Orders:  -     POC Urinalysis Dipstick  -     Urine Culture - Urine, Urine, Clean Catch  -     Urine Drug Screen - Urine, Clean Catch; Future  -     OB Panel With HIV  -     Hemoglobinopathy Fractionation [place]; Future  -     Lillie Panorama Prenatal Test: Chromosomes 13, 18, 21, X & Y: Triploidy 22Q.11.2 Deletion - Blood,; Future  -     Chlamydia trachomatis, Neisseria gonorrhoeae, PCR - Swab, Cervix; Future  -     Iron  -     Ferritin  -     Vitamin B12 & Folate  -     Calcium  -     Vitamin D,25-Hydroxy  -     Comprehensive Metabolic Panel  -     Protein / Creatinine Ratio, Urine - Urine, Clean Catch  -     US Ob Detail Fetal Anatomy Single or First Gestation; Future  -     Hemoglobin A1c  -     Hemoglobinopathy Fractionation [place]  -     Lillie Panorama Prenatal Test: Chromosomes 13, 18, 21, X & Y: Triploidy 22Q.11.2 Deletion - Blood, Blood, Venous    2. Nausea and vomiting, unspecified vomiting type  Comments:  improved w zofran and reglan  Overview:  Vit B6  Unisom- stopped, makes sleepy  Zofran 4mg q6hrs scheduled  Phenergan 25mg PO qhs      3. Vaginal discharge during pregnancy  in first trimester  -     Gardnerella vaginalis, Trichomonas vaginalis, Candida albicans, DNA - Swab, Vagina; Future    Other orders  -     aspirin 81 MG EC tablet; Take 1 tablet by mouth Daily.  Dispense: 90 tablet; Refill: 7  -     LABS SCANNED        Counseling:   Nutrition discussed, calories, activity/exercise in pregnancy  Discussed dietary restrictions/safety food preparation in pregnancy  Reviewed what to expect prenatal visits, office providers (female and male) and covering [facility]  Appropriate trimester precautions provided, N/V, vag bleeding, cramping  VACCINE importance in pregnancy discussed.  Maternal and fetal risk of not being vaccinated reviewed NLT increased risk maternal/fetal severity of illness/death, PTD, CS, hemorrhage, HTN, possible IUFD.  Significant maternal and fetal/infant benefit w vaccination.  FDA approval and ACOG/SMFM/CDC strong recommendation in pregnancy.  Questions answered.       Return in about 4 weeks (around 5/12/2025) for FU OB x2, second one w anatomy US.            Gertrude Bower,   04/14/2025    [facility] [address] [facility]  [address] [place] [address] [place] [address]  Dept: [phone]  Dept Fax: [phone]  Loc: [phone]  Loc Fax: [phone]

## 2025-04-12 PROBLEM — Z98.84 S/P LAPAROSCOPIC SLEEVE GASTRECTOMY: Status: RESOLVED | Noted: 2024-03-08 | Resolved: 2025-04-12

## 2025-04-14 ENCOUNTER — INITIAL PRENATAL (OUTPATIENT)
Dept: OBSTETRICS AND GYNECOLOGY | Age: 30
End: 2025-04-14
Payer: COMMERCIAL

## 2025-04-14 VITALS — WEIGHT: 225 LBS | BODY MASS INDEX: 31.83 KG/M2 | SYSTOLIC BLOOD PRESSURE: 121 MMHG | DIASTOLIC BLOOD PRESSURE: 81 MMHG

## 2025-04-14 DIAGNOSIS — Z34.80 SUPERVISION OF OTHER NORMAL PREGNANCY, ANTEPARTUM: Primary | ICD-10-CM

## 2025-04-14 DIAGNOSIS — O26.891 VAGINAL DISCHARGE DURING PREGNANCY IN FIRST TRIMESTER: ICD-10-CM

## 2025-04-14 DIAGNOSIS — N89.8 VAGINAL DISCHARGE DURING PREGNANCY IN FIRST TRIMESTER: ICD-10-CM

## 2025-04-14 DIAGNOSIS — R11.2 NAUSEA AND VOMITING, UNSPECIFIED VOMITING TYPE: ICD-10-CM

## 2025-04-14 LAB
25(OH)D3 SERPL-MCNC: 18.3 NG/ML (ref 30–100)
ABO GROUP BLD: NORMAL
AMPHET+METHAMPHET UR QL: NEGATIVE
AMPHETAMINES UR QL: NEGATIVE
BARBITURATES UR QL SCN: NEGATIVE
BASOPHILS # BLD AUTO: 0.02 10*3/MM3 (ref 0–0.2)
BASOPHILS NFR BLD AUTO: 0.4 % (ref 0–1.5)
BENZODIAZ UR QL SCN: NEGATIVE
BLD GP AB SCN SERPL QL: NEGATIVE
BUPRENORPHINE SERPL-MCNC: NEGATIVE NG/ML
C TRACH RRNA CVX QL NAA+PROBE: NOT DETECTED
CALCIUM SPEC-SCNC: 8.8 MG/DL (ref 8.6–10.5)
CANDIDA SPECIES: NEGATIVE
CANNABINOIDS SERPL QL: NEGATIVE
COCAINE UR QL: NEGATIVE
CREAT UR-MCNC: 172 MG/DL
DEPRECATED RDW RBC AUTO: 42.3 FL (ref 37–54)
EOSINOPHIL # BLD AUTO: 0.17 10*3/MM3 (ref 0–0.4)
EOSINOPHIL NFR BLD AUTO: 3.1 % (ref 0.3–6.2)
ERYTHROCYTE [DISTWIDTH] IN BLOOD BY AUTOMATED COUNT: 13.3 % (ref 12.3–15.4)
FENTANYL UR-MCNC: NEGATIVE NG/ML
FERRITIN SERPL-MCNC: 67.2 NG/ML (ref 13–150)
FOLATE SERPL-MCNC: 7.54 NG/ML (ref 4.78–24.2)
GARDNERELLA VAGINALIS: NEGATIVE
GLUCOSE UR STRIP-MCNC: NEGATIVE MG/DL
HBA1C MFR BLD: 4.4 % (ref 4.8–5.6)
HBV SURFACE AG SERPL QL IA: NORMAL
HCT VFR BLD AUTO: 37 % (ref 34–46.6)
HCV AB SER QL: NORMAL
HGB BLD-MCNC: 12.1 G/DL (ref 12–15.9)
HIV 1+2 AB+HIV1 P24 AG SERPL QL IA: NORMAL
IMM GRANULOCYTES # BLD AUTO: 0.01 10*3/MM3 (ref 0–0.05)
IMM GRANULOCYTES NFR BLD AUTO: 0.2 % (ref 0–0.5)
IRON 24H UR-MRATE: 57 MCG/DL (ref 37–145)
LYMPHOCYTES # BLD AUTO: 1.33 10*3/MM3 (ref 0.7–3.1)
LYMPHOCYTES NFR BLD AUTO: 24.2 % (ref 19.6–45.3)
MCH RBC QN AUTO: 28.6 PG (ref 26.6–33)
MCHC RBC AUTO-ENTMCNC: 32.7 G/DL (ref 31.5–35.7)
MCV RBC AUTO: 87.5 FL (ref 79–97)
METHADONE UR QL SCN: NEGATIVE
MONOCYTES # BLD AUTO: 0.31 10*3/MM3 (ref 0.1–0.9)
MONOCYTES NFR BLD AUTO: 5.6 % (ref 5–12)
N GONORRHOEA RRNA SPEC QL NAA+PROBE: NOT DETECTED
NEUTROPHILS NFR BLD AUTO: 3.66 10*3/MM3 (ref 1.7–7)
NEUTROPHILS NFR BLD AUTO: 66.5 % (ref 42.7–76)
NRBC BLD AUTO-RTO: 0 /100 WBC (ref 0–0.2)
OPIATES UR QL: NEGATIVE
OXYCODONE UR QL SCN: NEGATIVE
PCP UR QL SCN: NEGATIVE
PLATELET # BLD AUTO: 209 10*3/MM3 (ref 140–450)
PMV BLD AUTO: 10.9 FL (ref 6–12)
PROT ?TM UR-MCNC: 9.8 MG/DL
PROT UR STRIP-MCNC: NEGATIVE MG/DL
PROT/CREAT UR: 0.06 MG/G{CREAT}
RBC # BLD AUTO: 4.23 10*6/MM3 (ref 3.77–5.28)
RH BLD: POSITIVE
T VAGINALIS DNA VAG QL PROBE+SIG AMP: NEGATIVE
TRICYCLICS UR QL SCN: NEGATIVE
VIT B12 BLD-MCNC: 351 PG/ML (ref 211–946)
WBC NRBC COR # BLD AUTO: 5.5 10*3/MM3 (ref 3.4–10.8)

## 2025-04-14 PROCEDURE — 87491 CHLMYD TRACH DNA AMP PROBE: CPT | Performed by: OBSTETRICS & GYNECOLOGY

## 2025-04-14 PROCEDURE — 87591 N.GONORRHOEAE DNA AMP PROB: CPT | Performed by: OBSTETRICS & GYNECOLOGY

## 2025-04-14 PROCEDURE — 80307 DRUG TEST PRSMV CHEM ANLYZR: CPT | Performed by: OBSTETRICS & GYNECOLOGY

## 2025-04-14 PROCEDURE — 84156 ASSAY OF PROTEIN URINE: CPT | Performed by: OBSTETRICS & GYNECOLOGY

## 2025-04-14 PROCEDURE — 83020 HEMOGLOBIN ELECTROPHORESIS: CPT | Performed by: OBSTETRICS & GYNECOLOGY

## 2025-04-14 PROCEDURE — 82310 ASSAY OF CALCIUM: CPT | Performed by: OBSTETRICS & GYNECOLOGY

## 2025-04-14 PROCEDURE — 87510 GARDNER VAG DNA DIR PROBE: CPT | Performed by: OBSTETRICS & GYNECOLOGY

## 2025-04-14 PROCEDURE — 82728 ASSAY OF FERRITIN: CPT | Performed by: OBSTETRICS & GYNECOLOGY

## 2025-04-14 PROCEDURE — 87086 URINE CULTURE/COLONY COUNT: CPT | Performed by: OBSTETRICS & GYNECOLOGY

## 2025-04-14 PROCEDURE — 83540 ASSAY OF IRON: CPT | Performed by: OBSTETRICS & GYNECOLOGY

## 2025-04-14 PROCEDURE — 80081 OBSTETRIC PANEL INC HIV TSTG: CPT | Performed by: OBSTETRICS & GYNECOLOGY

## 2025-04-14 PROCEDURE — 82607 VITAMIN B-12: CPT | Performed by: OBSTETRICS & GYNECOLOGY

## 2025-04-14 PROCEDURE — 87480 CANDIDA DNA DIR PROBE: CPT | Performed by: OBSTETRICS & GYNECOLOGY

## 2025-04-14 PROCEDURE — 82306 VITAMIN D 25 HYDROXY: CPT | Performed by: OBSTETRICS & GYNECOLOGY

## 2025-04-14 PROCEDURE — 82570 ASSAY OF URINE CREATININE: CPT | Performed by: OBSTETRICS & GYNECOLOGY

## 2025-04-14 PROCEDURE — 83036 HEMOGLOBIN GLYCOSYLATED A1C: CPT | Performed by: OBSTETRICS & GYNECOLOGY

## 2025-04-14 PROCEDURE — 86803 HEPATITIS C AB TEST: CPT | Performed by: OBSTETRICS & GYNECOLOGY

## 2025-04-14 PROCEDURE — 82746 ASSAY OF FOLIC ACID SERUM: CPT | Performed by: OBSTETRICS & GYNECOLOGY

## 2025-04-14 PROCEDURE — 87660 TRICHOMONAS VAGIN DIR PROBE: CPT | Performed by: OBSTETRICS & GYNECOLOGY

## 2025-04-14 RX ORDER — ASPIRIN 81 MG/1
81 TABLET ORAL DAILY
Qty: 90 TABLET | Refills: 7 | Status: SHIPPED | OUTPATIENT
Start: 2025-04-14

## 2025-04-15 ENCOUNTER — TELEPHONE (OUTPATIENT)
Dept: OBSTETRICS AND GYNECOLOGY | Age: 30
End: 2025-04-15
Payer: COMMERCIAL

## 2025-04-15 PROBLEM — R79.89 LOW VITAMIN D LEVEL: Status: ACTIVE | Noted: 2025-04-15

## 2025-04-15 LAB
BACTERIA SPEC AEROBE CULT: NO GROWTH
RPR SER QL: NORMAL

## 2025-04-15 NOTE — TELEPHONE ENCOUNTER
Called with questions about her results and what has returned so far. Results that were available reviewed and discussed. Questions answered.

## 2025-04-16 LAB
HGB A MFR BLD ELPH: 97.3 % (ref 96.4–98.8)
HGB A2 MFR BLD ELPH: 2.7 % (ref 1.8–3.2)
HGB F MFR BLD ELPH: 0 % (ref 0–2)
HGB FRACT BLD-IMP: NORMAL
HGB S MFR BLD ELPH: 0 %
RUBV IGG SERPL IA-ACNC: 1.09 INDEX

## 2025-04-19 ENCOUNTER — HOSPITAL ENCOUNTER (EMERGENCY)
Facility: HOSPITAL | Age: 30
Discharge: HOME | End: 2025-04-20
Attending: EMERGENCY MEDICINE
Payer: COMMERCIAL

## 2025-04-19 DIAGNOSIS — O21.9 EXCESSIVE VOMITING IN PREGNANCY: Primary | ICD-10-CM

## 2025-04-19 LAB
ALBUMIN SERPL-MCNC: 3.7 G/DL (ref 3.5–5.2)
ALBUMIN/GLOB SERPL: 1.1 G/DL
ALP SERPL-CCNC: 64 U/L (ref 39–117)
ALT SERPL W P-5'-P-CCNC: 7 U/L (ref 1–33)
ANION GAP SERPL CALCULATED.3IONS-SCNC: 11.6 MMOL/L (ref 5–15)
AST SERPL-CCNC: 12 U/L (ref 1–32)
BACTERIA UR QL AUTO: ABNORMAL /HPF
BASOPHILS # BLD AUTO: 0.01 10*3/MM3 (ref 0–0.2)
BASOPHILS NFR BLD AUTO: 0.3 % (ref 0–1.5)
BILIRUB SERPL-MCNC: 1 MG/DL (ref 0–1.2)
BILIRUB UR QL STRIP: ABNORMAL
BUN SERPL-MCNC: 9 MG/DL (ref 6–20)
BUN/CREAT SERPL: 15.3 (ref 7–25)
CALCIUM SPEC-SCNC: 8.9 MG/DL (ref 8.6–10.5)
CHLORIDE SERPL-SCNC: 100 MMOL/L (ref 98–107)
CLARITY UR: CLEAR
CO2 SERPL-SCNC: 23.4 MMOL/L (ref 22–29)
COLOR UR: YELLOW
CREAT SERPL-MCNC: 0.59 MG/DL (ref 0.57–1)
DEPRECATED RDW RBC AUTO: 43 FL (ref 37–54)
EGFRCR SERPLBLD CKD-EPI 2021: 123.7 ML/MIN/1.73
EOSINOPHIL # BLD AUTO: 0.07 10*3/MM3 (ref 0–0.4)
EOSINOPHIL NFR BLD AUTO: 2.1 % (ref 0.3–6.2)
ERYTHROCYTE [DISTWIDTH] IN BLOOD BY AUTOMATED COUNT: 13.9 % (ref 12.3–15.4)
GLOBULIN UR ELPH-MCNC: 3.4 GM/DL
GLUCOSE SERPL-MCNC: 94 MG/DL (ref 65–99)
GLUCOSE UR STRIP-MCNC: NEGATIVE MG/DL
HCT VFR BLD AUTO: 36.1 % (ref 34–46.6)
HGB BLD-MCNC: 12.5 G/DL (ref 12–15.9)
HGB UR QL STRIP.AUTO: NEGATIVE
HOLD SPECIMEN: NORMAL
HOLD SPECIMEN: NORMAL
HYALINE CASTS UR QL AUTO: ABNORMAL /LPF
IMM GRANULOCYTES # BLD AUTO: 0 10*3/MM3 (ref 0–0.05)
IMM GRANULOCYTES NFR BLD AUTO: 0 % (ref 0–0.5)
KETONES UR QL STRIP: ABNORMAL
LEUKOCYTE ESTERASE UR QL STRIP.AUTO: NEGATIVE
LIPASE SERPL-CCNC: 26 U/L (ref 13–60)
LYMPHOCYTES # BLD AUTO: 0.84 10*3/MM3 (ref 0.7–3.1)
LYMPHOCYTES NFR BLD AUTO: 24.8 % (ref 19.6–45.3)
Lab: NORMAL
MCH RBC QN AUTO: 29.2 PG (ref 26.6–33)
MCHC RBC AUTO-ENTMCNC: 34.6 G/DL (ref 31.5–35.7)
MCV RBC AUTO: 84.3 FL (ref 79–97)
MONOCYTES # BLD AUTO: 0.27 10*3/MM3 (ref 0.1–0.9)
MONOCYTES NFR BLD AUTO: 8 % (ref 5–12)
NEUTROPHILS NFR BLD AUTO: 2.2 10*3/MM3 (ref 1.7–7)
NEUTROPHILS NFR BLD AUTO: 64.8 % (ref 42.7–76)
NITRITE UR QL STRIP: NEGATIVE
NRBC BLD AUTO-RTO: 0 /100 WBC (ref 0–0.2)
NTRA 22Q11.2 DELETION SYNDROME POPULATION-BASED RISK TEXT: NORMAL
NTRA 22Q11.2 DELETION SYNDROME RESULT TEXT: NORMAL
NTRA 22Q11.2 DELETION SYNDROME RISK SCORE TEXT: NORMAL
NTRA FETAL FRACTION: NORMAL
NTRA GENDER OF FETUS: NORMAL
NTRA MONOSOMY X AGE-BASED RISK TEXT: NORMAL
NTRA MONOSOMY X RESULT TEXT: NORMAL
NTRA MONOSOMY X RISK SCORE TEXT: NORMAL
NTRA TRIPLOIDY RESULT TEXT: NORMAL
NTRA TRISOMY 13 AGE-BASED RISK TEXT: NORMAL
NTRA TRISOMY 13 RESULT TEXT: NORMAL
NTRA TRISOMY 13 RISK SCORE TEXT: NORMAL
NTRA TRISOMY 18 AGE-BASED RISK TEXT: NORMAL
NTRA TRISOMY 18 RESULT TEXT: NORMAL
NTRA TRISOMY 18 RISK SCORE TEXT: NORMAL
NTRA TRISOMY 21 AGE-BASED RISK TEXT: NORMAL
NTRA TRISOMY 21 RESULT TEXT: NORMAL
NTRA TRISOMY 21 RISK SCORE TEXT: NORMAL
PH UR STRIP.AUTO: 5.5 [PH] (ref 5–8)
PLATELET # BLD AUTO: 184 10*3/MM3 (ref 140–450)
PMV BLD AUTO: 10.1 FL (ref 6–12)
POTASSIUM SERPL-SCNC: 3.3 MMOL/L (ref 3.5–5.2)
PROT SERPL-MCNC: 7.1 G/DL (ref 6–8.5)
PROT UR QL STRIP: ABNORMAL
RBC # BLD AUTO: 4.28 10*6/MM3 (ref 3.77–5.28)
RBC # UR STRIP: ABNORMAL /HPF
REF LAB TEST METHOD: ABNORMAL
SODIUM SERPL-SCNC: 135 MMOL/L (ref 136–145)
SP GR UR STRIP: >=1.03 (ref 1–1.03)
SQUAMOUS #/AREA URNS HPF: ABNORMAL /HPF
UROBILINOGEN UR QL STRIP: ABNORMAL
WBC # UR STRIP: ABNORMAL /HPF
WBC NRBC COR # BLD AUTO: 3.39 10*3/MM3 (ref 3.4–10.8)
WHOLE BLOOD HOLD COAG: NORMAL
WHOLE BLOOD HOLD SPECIMEN: NORMAL

## 2025-04-19 PROCEDURE — 99283 EMERGENCY DEPT VISIT LOW MDM: CPT

## 2025-04-19 PROCEDURE — 87086 URINE CULTURE/COLONY COUNT: CPT | Performed by: EMERGENCY MEDICINE

## 2025-04-19 PROCEDURE — 25010000002 ONDANSETRON PER 1 MG: Performed by: NURSE PRACTITIONER

## 2025-04-19 PROCEDURE — 85025 COMPLETE CBC W/AUTO DIFF WBC: CPT | Performed by: NURSE PRACTITIONER

## 2025-04-19 PROCEDURE — 36415 COLL VENOUS BLD VENIPUNCTURE: CPT

## 2025-04-19 PROCEDURE — 81001 URINALYSIS AUTO W/SCOPE: CPT | Performed by: NURSE PRACTITIONER

## 2025-04-19 PROCEDURE — 96361 HYDRATE IV INFUSION ADD-ON: CPT

## 2025-04-19 PROCEDURE — 80053 COMPREHEN METABOLIC PANEL: CPT | Performed by: NURSE PRACTITIONER

## 2025-04-19 PROCEDURE — 83690 ASSAY OF LIPASE: CPT | Performed by: NURSE PRACTITIONER

## 2025-04-19 PROCEDURE — 96375 TX/PRO/DX INJ NEW DRUG ADDON: CPT

## 2025-04-19 PROCEDURE — 25810000003 SODIUM CHLORIDE 0.9 % SOLUTION: Performed by: NURSE PRACTITIONER

## 2025-04-19 PROCEDURE — 25010000002 THIAMINE HCL 200 MG/2ML SOLUTION: Performed by: EMERGENCY MEDICINE

## 2025-04-19 PROCEDURE — 96374 THER/PROPH/DIAG INJ IV PUSH: CPT

## 2025-04-19 RX ORDER — ONDANSETRON 2 MG/ML
4 INJECTION INTRAMUSCULAR; INTRAVENOUS ONCE
Status: COMPLETED | OUTPATIENT
Start: 2025-04-19 | End: 2025-04-19

## 2025-04-19 RX ORDER — SODIUM CHLORIDE 0.9 % (FLUSH) 0.9 %
10 SYRINGE (ML) INJECTION AS NEEDED
Status: DISCONTINUED | OUTPATIENT
Start: 2025-04-19 | End: 2025-04-20 | Stop reason: HOSPADM

## 2025-04-19 RX ORDER — THIAMINE HYDROCHLORIDE 100 MG/ML
100 INJECTION, SOLUTION INTRAMUSCULAR; INTRAVENOUS ONCE
Status: COMPLETED | OUTPATIENT
Start: 2025-04-19 | End: 2025-04-19

## 2025-04-19 RX ADMIN — ONDANSETRON 4 MG: 2 INJECTION INTRAMUSCULAR; INTRAVENOUS at 23:31

## 2025-04-19 RX ADMIN — SODIUM CHLORIDE 1000 ML: 9 INJECTION, SOLUTION INTRAVENOUS at 23:30

## 2025-04-19 RX ADMIN — THIAMINE HYDROCHLORIDE 100 MG: 100 INJECTION, SOLUTION INTRAMUSCULAR; INTRAVENOUS at 23:31

## 2025-04-20 VITALS
SYSTOLIC BLOOD PRESSURE: 110 MMHG | HEART RATE: 76 BPM | DIASTOLIC BLOOD PRESSURE: 75 MMHG | RESPIRATION RATE: 14 BRPM | WEIGHT: 232.81 LBS | TEMPERATURE: 97.9 F | HEIGHT: 71 IN | BODY MASS INDEX: 32.59 KG/M2 | OXYGEN SATURATION: 98 %

## 2025-04-20 PROCEDURE — 96375 TX/PRO/DX INJ NEW DRUG ADDON: CPT

## 2025-04-20 PROCEDURE — 25010000002 METOCLOPRAMIDE PER 10 MG: Performed by: EMERGENCY MEDICINE

## 2025-04-20 PROCEDURE — 25010000002 DIPHENHYDRAMINE PER 50 MG: Performed by: EMERGENCY MEDICINE

## 2025-04-20 PROCEDURE — 25810000003 SODIUM CHLORIDE 0.9 % SOLUTION: Performed by: EMERGENCY MEDICINE

## 2025-04-20 RX ORDER — DIPHENHYDRAMINE HYDROCHLORIDE 50 MG/ML
25 INJECTION, SOLUTION INTRAMUSCULAR; INTRAVENOUS ONCE
Status: COMPLETED | OUTPATIENT
Start: 2025-04-20 | End: 2025-04-20

## 2025-04-20 RX ORDER — METOCLOPRAMIDE HYDROCHLORIDE 5 MG/ML
10 INJECTION INTRAMUSCULAR; INTRAVENOUS ONCE
Status: COMPLETED | OUTPATIENT
Start: 2025-04-20 | End: 2025-04-20

## 2025-04-20 RX ORDER — NITROFURANTOIN 25; 75 MG/1; MG/1
100 CAPSULE ORAL 2 TIMES DAILY
Qty: 14 CAPSULE | Refills: 0 | Status: SHIPPED | OUTPATIENT
Start: 2025-04-20

## 2025-04-20 RX ADMIN — SODIUM CHLORIDE 1000 ML: 9 INJECTION, SOLUTION INTRAVENOUS at 00:26

## 2025-04-20 RX ADMIN — DIPHENHYDRAMINE HYDROCHLORIDE 25 MG: 50 INJECTION, SOLUTION INTRAMUSCULAR; INTRAVENOUS at 00:27

## 2025-04-20 RX ADMIN — METOCLOPRAMIDE 10 MG: 5 INJECTION, SOLUTION INTRAMUSCULAR; INTRAVENOUS at 00:27

## 2025-04-20 NOTE — DISCHARGE INSTRUCTIONS
As we discussed, you do have some bacteria in your urine.  I prescribed an antibiotic.  Follow-up your OB/GYN first thing Monday morning.  Stay well-hydrated, primarily focus on electrolyte baseboards drinks that contain sodium, chloride and potassium.

## 2025-04-20 NOTE — ED PROVIDER NOTES
Time: 11:33 PM EDT  Date of encounter:  4/19/2025  Independent Historian/Clinical History and Information was obtained by:   Patient    History is limited by: N/A    Chief Complaint: Pregnant, vomiting      History of Present Illness:  Patient is a 30 y.o. year old female who presents to the emergency department for evaluation of vomiting and pregnancy.  Patient is approximately 13 weeks pregnant.  She has been seeing her OB/GYN due to excessive vomiting in the past 6 to 8 weeks.  There have been discussions of possibly diagnosing her with hyperemesis gravidarum.  At this point that has not occurred.  Patient presents today for uncontrolled vomiting.  Denies any hematemesis.  Afebrile.  Has no pregnancy complaints.      Patient Care Team  Primary Care Provider: Kendrick Thompson Jr., MD    Past Medical History:     Allergies   Allergen Reactions    Cephalexin Anaphylaxis    Penicillins Swelling and Rash    Latex Rash     Past Medical History:   Diagnosis Date    Allergic     Anxiety     Depression     Disease of thyroid gland     Dysphagia     Foot pain, bilateral     Foot pain, left     Gestational hypertension     Hiatal hernia 02/22/2024    History of gestational hypertension     Insomnia     Low libido 01/13/2023    Migraine WITH aura     Obesity     Obsessive-compulsive disorder     PONV (postoperative nausea and vomiting)     S/P laparoscopic sleeve gastrectomy with HHR 03/08/2024    Seasonal allergies     Stress fracture of left foot      Past Surgical History:   Procedure Laterality Date    APPENDECTOMY  2012    ENDOSCOPY N/A 01/30/2024    Procedure: ESOPHAGOGASTRODUODENOSCOPY WITH BIOPSY;  Surgeon: Josue Moore Jr., MD;  Location: [facility] ENDOSCOPY;  Service: General;  Laterality: N/A;  PRE- DYSPEPSIA  POST- GASTRITIS, HIATAL HERNIA    ENDOSCOPY N/A 04/19/2024    Procedure: ESOPHAGOGASTRODUODENOSCOPY WITH BALLOON DILITATION SIZE 18-20;  Surgeon: Josue Moore Jr., MD;  Location: [facility]  ENDOSCOPY;  Service: General;  Laterality: N/A;  PRE: DYSPHAGIA /   POST: SAME    GASTRIC SLEEVE LAPAROSCOPIC N/A 03/04/2024    Procedure: Sleeve gastrectomy LAPAROSCOPIC WITH DAVINCI ROBOT With Hiatal Hernia Repair;  Surgeon: Josue Moore Jr., MD;  [place] [facility] [place] [facility] [place]  Service: Robotics - DaVinci;  Laterality: N/A;    RETAINED PLACENTA REMOVAL  2019    D+C w second tri loss    SKIN BIOPSY  2021    [facility]     Family History   Problem Relation Age of Onset    Hypertension Father     Diabetes Father     Uterine cancer Mother         endometrial hyperplasia s/p hyst    Endometrial cancer Mother         HYPERPLASIA    Hypertension Mother     Anxiety disorder Mother     Depression Mother     Sleep apnea Mother     Insomnia Mother     Lung cancer Paternal Grandmother     Esophageal cancer Maternal Grandfather     Stroke Neg Hx     Breast cancer Neg Hx     Colon cancer Neg Hx     Ovarian cancer Neg Hx     Pulmonary embolism Neg Hx     Deep vein thrombosis Neg Hx     Malig Hyperthermia Neg Hx        Home Medications:  Prior to Admission medications    Medication Sig Start Date End Date Taking? Authorizing Provider   aspirin 81 MG EC tablet Take 1 tablet by mouth Daily. 4/14/25   Gertrude Bower DO   cholecalciferol 25 MCG (1000 UT) tablet Take 1 tablet by mouth Daily. 4/15/25   Gertrude Bower DO   doxylamine (UNISOM) 25 MG tablet Take 0.5 tablets by mouth 2 (Two) Times a Day As Needed for Nausea (or anxiety). OR TAKE FULL TABLET 25MG AT BEDTIME TO HELP SLEEP 3/13/25   Gertrude Bower DO   metoclopramide (REGLAN) 10 MG tablet Take 1 tablet by mouth 4 (Four) Times a Day As Needed (nausea and vomiting). 4/9/25   Gertrude Bower DO   ondansetron (Zofran) 4 MG tablet Take 1 tablet by mouth Daily As Needed for Nausea or Vomiting. 2/21/25 2/21/26  Gertrude Bower DO   ondansetron ODT (ZOFRAN-ODT) 4 MG disintegrating tablet Place 1 tablet on the tongue Every 8 (Eight) Hours As Needed for Nausea or Vomiting. 4/9/25   Gertrude Bower  "DO   Prenatal MV-Min-Fe Fum-FA-DHA (Prenatal Multivitamin + DHA) 28-0.8 & 200 MG misc Take 1 tablet by mouth Daily. 2/3/25   Gertrude Bower, DO   promethazine (PHENERGAN) 25 MG tablet Take 1 tablet by mouth Every 6 (Six) Hours As Needed for Nausea or Vomiting. 3/13/25   Gertrude Bower, DO   promethazine (PHENERGAN) 25 MG tablet Take 1 tablet by mouth Every 6 (Six) Hours As Needed for Nausea or Vomiting. 4/9/25   Gertrude Bower DO   vitamin B-6 (PYRIDOXINE) 25 MG tablet Take 1 tablet by mouth 2 (Two) Times a Day. For nausea in pregnancy 3/13/25   Gertrude Bower, DO        Social History:   Social History     Tobacco Use    Smoking status: Never    Smokeless tobacco: Never   Vaping Use    Vaping status: Never Used   Substance Use Topics    Alcohol use: Yes     Comment: drinks rarely    Drug use: Never         Review of Systems:  Review of Systems   Constitutional:  Negative for chills and fever.   HENT:  Negative for congestion, rhinorrhea and sore throat.    Eyes:  Negative for photophobia.   Respiratory:  Negative for apnea, cough, chest tightness and shortness of breath.    Cardiovascular:  Negative for chest pain and palpitations.   Gastrointestinal:  Positive for nausea and vomiting. Negative for abdominal pain and diarrhea.   Endocrine: Negative.    Genitourinary:  Negative for difficulty urinating and dysuria.   Musculoskeletal:  Negative for back pain, joint swelling and myalgias.   Skin:  Negative for color change and wound.   Allergic/Immunologic: Negative.    Neurological:  Negative for seizures and headaches.   Psychiatric/Behavioral: Negative.     All other systems reviewed and are negative.       Physical Exam:  /74   Pulse 95   Temp 97.5 °F (36.4 °C) (Oral)   Resp 14   Ht 180.3 cm (71\")   Wt 106 kg (232 lb 12.9 oz)   LMP 01/21/2025 (Exact Date)   SpO2 100%   BMI 32.47 kg/m²     Physical Exam  Vitals and nursing note reviewed.   Constitutional:       General: She is awake.      Appearance: Normal " appearance.   HENT:      Head: Normocephalic and atraumatic.      Nose: Nose normal.      Mouth/Throat:      Mouth: Mucous membranes are moist.   Eyes:      Extraocular Movements: Extraocular movements intact.      Pupils: Pupils are equal, round, and reactive to light.   Cardiovascular:      Rate and Rhythm: Normal rate and regular rhythm.      Heart sounds: Normal heart sounds.   Pulmonary:      Effort: Pulmonary effort is normal. No respiratory distress.      Breath sounds: Normal breath sounds. No wheezing, rhonchi or rales.   Abdominal:      General: Bowel sounds are normal.      Palpations: Abdomen is soft.      Tenderness: There is no abdominal tenderness. There is no guarding or rebound.      Comments: No rigidity   Musculoskeletal:         General: No tenderness. Normal range of motion.      Cervical back: Normal range of motion and neck supple.   Skin:     General: Skin is warm and dry.      Coloration: Skin is not jaundiced.   Neurological:      General: No focal deficit present.      Mental Status: She is alert. Mental status is at baseline.   Psychiatric:         Mood and Affect: Mood normal.                    Medical Decision Making:      Comorbidities that affect care:    First trimester pregnancy, anxiety/depression, migraines, gestational hypertension    External Notes reviewed:    Telephone Encounter: OB/GYN telephone follow-up 4/15/2025 with Dr. Roberson.  Description: Initial prenatal visit follow-up.      The following orders were placed and all results were independently analyzed by me:  Orders Placed This Encounter   Procedures    [place] Draw    Comprehensive Metabolic Panel    Lipase    Urinalysis With Microscopic If Indicated (No Culture) - Urine, Clean Catch    CBC Auto Differential    Urinalysis, Microscopic Only - Urine, Clean Catch    Urinalysis With Culture If Indicated -    Monitor fetal heart tones    Insert Peripheral IV    CBC & Differential    Green Top (Gel)    Lavender Top    Gold  Top - SST    Light Blue Top       Medications Given in the Emergency Department:  Medications   sodium chloride 0.9 % flush 10 mL (has no administration in time range)   sodium chloride 0.9 % bolus 1,000 mL (0 mL Intravenous Stopped 4/20/25 0025)   ondansetron (ZOFRAN) injection 4 mg (4 mg Intravenous Given 4/19/25 2331)   thiamine (B-1) injection 100 mg (100 mg Intravenous Given 4/19/25 2331)   metoclopramide (REGLAN) injection 10 mg (10 mg Intravenous Given 4/20/25 0027)   diphenhydrAMINE (BENADRYL) injection 25 mg (25 mg Intravenous Given 4/20/25 0027)   sodium chloride 0.9 % bolus 1,000 mL (0 mL Intravenous Stopped 4/20/25 0125)        ED Course:         Labs:    Lab Results (last 24 hours)       Procedure Component Value Units Date/Time    CBC & Differential [[id number]]  (Abnormal) Collected: 04/19/25 2300    Specimen: Blood Updated: 04/19/25 2309    Narrative:      The following orders were created for panel order CBC & Differential.  Procedure                               Abnormality         Status                     ---------                               -----------         ------                     CBC Auto Differential[[id number]]        Abnormal            Final result                 Please view results for these tests on the individual orders.    Comprehensive Metabolic Panel [[id number]]  (Abnormal) Collected: 04/19/25 2300    Specimen: Blood Updated: 04/19/25 2331     Glucose 94 mg/dL      BUN 9 mg/dL      Creatinine 0.59 mg/dL      Sodium 135 mmol/L      Potassium 3.3 mmol/L      Chloride 100 mmol/L      CO2 23.4 mmol/L      Calcium 8.9 mg/dL      Total Protein 7.1 g/dL      Albumin 3.7 g/dL      ALT (SGPT) 7 U/L      AST (SGOT) 12 U/L      Alkaline Phosphatase 64 U/L      Total Bilirubin 1.0 mg/dL      Globulin 3.4 gm/dL      A/G Ratio 1.1 g/dL      BUN/Creatinine Ratio 15.3     Anion Gap 11.6 mmol/L      eGFR 123.7 mL/min/1.73     Narrative:      GFR Categories in Chronic Kidney Disease  (CKD)      GFR Category          GFR (mL/min/1.73)    Interpretation  G1                     90 or greater         Normal or high (1)  G2                      60-89                Mild decrease (1)  G3a                   45-59                Mild to moderate decrease  G3b                   30-44                Moderate to severe decrease  G4                    15-29                Severe decrease  G5                    14 or less           Kidney failure          (1)In the absence of evidence of kidney disease, neither GFR category G1 or G2 fulfill the criteria for CKD.    eGFR calculation 2021 CKD-EPI creatinine equation, which does not include race as a factor    Lipase [[id number]]  (Normal) Collected: 04/19/25 2300    Specimen: Blood Updated: 04/19/25 2331     Lipase 26 U/L     Urinalysis With Microscopic If Indicated (No Culture) - Urine, Clean Catch [[id number]]  (Abnormal) Collected: 04/19/25 2300    Specimen: Urine, Clean Catch Updated: 04/19/25 2323     Color, UA Yellow     Appearance, UA Clear     pH, UA 5.5     Specific Gravity, UA >=1.030     Glucose, UA Negative     Ketones, UA 15 mg/dL (1+)     Bilirubin, UA Moderate (2+)     Blood, UA Negative     Protein, UA Trace     Leuk Esterase, UA Negative     Nitrite, UA Negative     Urobilinogen, UA 4.0 E.U./dL    Narrative:      Urine microscopic not indicated.    CBC Auto Differential [[id number]]  (Abnormal) Collected: 04/19/25 2300    Specimen: Blood Updated: 04/19/25 2309     WBC 3.39 10*3/mm3      RBC 4.28 10*6/mm3      Hemoglobin 12.5 g/dL      Hematocrit 36.1 %      MCV 84.3 fL      MCH 29.2 pg      MCHC 34.6 g/dL      RDW 13.9 %      RDW-SD 43.0 fl      MPV 10.1 fL      Platelets 184 10*3/mm3      Neutrophil % 64.8 %      Lymphocyte % 24.8 %      Monocyte % 8.0 %      Eosinophil % 2.1 %      Basophil % 0.3 %      Immature Grans % 0.0 %      Neutrophils, Absolute 2.20 10*3/mm3      Lymphocytes, Absolute 0.84 10*3/mm3      Monocytes, Absolute 0.27 10*3/mm3       Eosinophils, Absolute 0.07 10*3/mm3      Basophils, Absolute 0.01 10*3/mm3      Immature Grans, Absolute 0.00 10*3/mm3      nRBC 0.0 /100 WBC     Urinalysis, Microscopic Only - Urine, Clean Catch [[id number]]  (Abnormal) Collected: 04/19/25 2300    Specimen: Urine, Clean Catch Updated: 04/19/25 2315     RBC, UA 0-2 /HPF      WBC, UA 11-20 /HPF      Bacteria, UA 1+ /HPF      Squamous Epithelial Cells, UA 7-12 /HPF      Hyaline Casts, UA 3-6 /LPF      Methodology Automated Microscopy             Imaging:    No Radiology Exams Resulted Within Past 24 Hours      Differential Diagnosis and Discussion:    Vomiting: Differential diagnosis includes but is not limited to migraine, labyrinthine disorders, psychogenic, metabolic and endocrine causes, peptic ulcer, gastric outlet obstruction, gastritis, gastroenteritis, appendicitis, intestinal obstruction, paralytic ileus, food poisoning, cholecystitis, acute hepatitis, acute pancreatitis, acute febrile illness, and myocardial infarction.    PROCEDURES:    Labs were collected in the emergency department and all labs were reviewed and interpreted by me.    No orders to display       Procedures    MDM                     Patient Care Considerations:    SEPSIS was considered but is NOT present in the emergency department as SIRS criteria is not present.      Consultants/Shared Management Plan:    None    Social Determinants of Health:    Patient is independent, reliable, and has access to care.       Disposition and Care Coordination:    Discharged: I considered escalation of care by admitting this patient to the hospital, however patient has no evidence of renal dysfunction.  Her kidney function is normal.  She is not persistently hypotensive or tachycardic.    I have explained the patient´s condition, diagnoses and treatment plan based on the information available to me at this time. I have answered questions and addressed any concerns. The patient has a good  understanding  of the patient´s diagnosis, condition, and treatment plan as can be expected at this point. The vital signs have been stable. The patient´s condition is stable and appropriate for discharge from the emergency department.      The patient will pursue further outpatient evaluation with the primary care physician or other designated or consulting physician as outlined in the discharge instructions. They are agreeable to this plan of care and follow-up instructions have been explained in detail. The patient has received these instructions in written format and has expressed an understanding of the discharge instructions. The patient is aware that any significant change in condition or worsening of symptoms should prompt an immediate return to this or the closest emergency department or call to 911.    Final diagnoses:   Excessive vomiting in pregnancy        ED Disposition       ED Disposition   Discharge    Condition   Stable    Comment   --               This medical record created using voice recognition software.             Armando Leonard MD  04/20/25 0141

## 2025-04-21 LAB — BACTERIA SPEC AEROBE CULT: NORMAL

## 2025-04-28 RX ORDER — BLOOD-GLUCOSE METER
KIT MISCELLANEOUS
Qty: 1 EACH | Refills: 0 | Status: SHIPPED | OUTPATIENT
Start: 2025-04-28

## 2025-04-28 RX ORDER — BLOOD PRESSURE TEST KIT
KIT MISCELLANEOUS
Qty: 120 EACH | Refills: 3 | Status: SHIPPED | OUTPATIENT
Start: 2025-04-28

## 2025-04-28 RX ORDER — AVOBENZONE, HOMOSALATE, OCTISALATE, OCTOCRYLENE 30; 40; 45; 26 MG/ML; MG/ML; MG/ML; MG/ML
120 CREAM TOPICAL 4 TIMES DAILY
Qty: 120 EACH | Refills: 3 | Status: SHIPPED | OUTPATIENT
Start: 2025-04-28

## 2025-05-06 DIAGNOSIS — R11.2 NAUSEA AND VOMITING, UNSPECIFIED VOMITING TYPE: ICD-10-CM

## 2025-05-06 RX ORDER — PROMETHAZINE HYDROCHLORIDE 25 MG/1
25 TABLET ORAL EVERY 6 HOURS PRN
Qty: 30 TABLET | Refills: 1 | Status: SHIPPED | OUTPATIENT
Start: 2025-05-06

## 2025-05-06 RX ORDER — ONDANSETRON 4 MG/1
4 TABLET, FILM COATED ORAL DAILY PRN
Qty: 30 TABLET | Refills: 1 | Status: SHIPPED | OUTPATIENT
Start: 2025-05-06 | End: 2026-05-06

## 2025-05-09 NOTE — PROGRESS NOTES
OB FOLLOW UP      Chief Complaint   Patient presents with    Routine Prenatal Visit     Subjective:     No complaints.  Denies VB, leaking fluid, current regular cramping or contractions.      Objective:  /75   Wt 102 kg (225 lb)   LMP 01/21/2025 (Exact Date)   BMI 31.38 kg/m²  0 kg (0 lb) Body mass index is 31.38 kg/m².  Chaperone present during pelvic exam if performed.  See OB flow sheet for fundal height (not performed if US day of OV), FHT, edema, cvx exam if performed, and Upro/Uglu.       Assessment and Plan:  15w6d     Diagnoses and all orders for this visit:    1. Supervision of other normal pregnancy, antepartum (Primary)  Overview:  LUIS M finalized: 10/28/2025 by LMP and 6-week ultrasound    Optional testing NIPS,CF/SMA,AFP: CF neg 2020.  NIPS low risk.  Considering AFP 5/12/2025     COVID vaccine: Recommended  Flu vaccine: Vaccinated 2024-25  Tdap vaccine:    1hr Glucola:  NA, will plan BS checks for 1 week 24-28weeks  FU RPR w glucola:  ? Desires Sterilization:    Anatomy US: [facility] 6/13/25  FU US:    PROBLEM LIST/PLAN:   S/p gastric sleeve-avoid Glucola, check blood sugars 4 times daily 24 to 28 weeks.  Check vitamin levels at new OB visit and q tri   4/14/25 A1c wnl 4.4.  Vit d low- cont supplementation.  Nl iron, B12, folate, ferritin, calcium  Hx GHTN- Nl CMP and urine PC 0.06 at new OB visit.    81mg aspirin -continue  Anxiety/Depression- Doxylamine prn- continue prn. 5/12/2025 has not been needing.   Stable  Prior to preg  Dr. Robles counseling and Wellbutrin XL, Zoloft.  Stopped both meds in preg.   Hx sec tri loss-  NIPS low risk    Orders:  -     POC Urinalysis Dipstick    2. Elevated fasting blood sugar  Overview:  5/12/2025 has not checking blood sugars secondary to some lightheadedness and dizziness and blackout events.  Notices feels the symptoms especially if blood sugars are higher.  Has been watching diet and they have improved.  Fastings .  4 out of 13 elevated.  Over the  last 5 days all have been within normal limits.  2-hour postprandials 105-120.  Recommend high-protein snack before dinner.  Monitor diet with low carbs and low fats.  Increase exercise and consider nutrition consult if fastings persist elevated.  Blood sugar log provided.  Start checking 4 times a day.      Other orders  -     cholecalciferol 25 MCG (1000 UT) tablet; Take 1 tablet by mouth Daily.  Dispense: 90 tablet; Refill: 1      Counseling:    Second trimester precautions    Reassuring pregnancy progress. Continue PNV.      Return in about 4 weeks (around 6/9/2025) for FU OB w US anatomy, then 4weeks later.            Gertrude Bower, DO  05/12/2025    [facility] [address] [facility]  [address] [place] [address] [address] [place] [address]  Dept: [phone]  Dept Fax: [phone]  Loc: [phone]  Loc Fax: [phone]

## 2025-05-12 ENCOUNTER — ROUTINE PRENATAL (OUTPATIENT)
Dept: OBSTETRICS AND GYNECOLOGY | Age: 30
End: 2025-05-12
Payer: COMMERCIAL

## 2025-05-12 VITALS — WEIGHT: 225 LBS | DIASTOLIC BLOOD PRESSURE: 75 MMHG | BODY MASS INDEX: 31.38 KG/M2 | SYSTOLIC BLOOD PRESSURE: 130 MMHG

## 2025-05-12 DIAGNOSIS — Z34.80 SUPERVISION OF OTHER NORMAL PREGNANCY, ANTEPARTUM: Primary | ICD-10-CM

## 2025-05-12 DIAGNOSIS — R73.01 ELEVATED FASTING BLOOD SUGAR: ICD-10-CM

## 2025-05-12 LAB
GLUCOSE UR STRIP-MCNC: NEGATIVE MG/DL
PROT UR STRIP-MCNC: NEGATIVE MG/DL

## 2025-05-12 RX ORDER — CHOLECALCIFEROL (VITAMIN D3) 25 MCG
1000 TABLET ORAL DAILY
Qty: 90 TABLET | Refills: 1 | Status: SHIPPED | OUTPATIENT
Start: 2025-05-12

## 2025-06-11 NOTE — PROGRESS NOTES
OB FOLLOW UP      Chief Complaint   Patient presents with    Routine Prenatal Visit     Subjective:   Purvi Foreman is a 30 y.o.  20w1d patient being seen today for her routine obstetrical follow up visit.     Denies VB, leaking fluid, current regular cramping or contractions.    Earlier this week felt lightheaded BS 60, nl BP.  Then vomited twice, didn't go to work next day.  And 2weeks ago BS was low 57 and took sugar gel packet up to 174 approx 40min after.  Today feels ok, felt warm in US. Yesterday was fine.      Some painful urination, not sure if UTI    Objective:  /74   Wt 105 kg (231 lb)   LMP 2025 (Exact Date)   BMI 32.22 kg/m²  2.722 kg (6 lb) Body mass index is 32.22 kg/m².  Chaperone present during pelvic exam if performed.  See OB flow sheet for fundal height (not performed if US day of OV), FHT, edema, cvx exam if performed, and Upro/Uglu.       Assessment and Plan:  20w3d     Diagnoses and all orders for this visit:    1. Supervision of other normal pregnancy, antepartum (Primary)  Overview:  LUIS M finalized: 10/28/2025 by LMP and 6-week ultrasound    Optional testing NIPS,CF/SMA,AFP: CF neg .  NIPS low risk.  AFP 2025     COVID vaccine: Recommended  Flu vaccine: Vaccinated   Tdap vaccine:    1hr Glucola:  NA, will plan BS checks for 1 week 24-28weeks  FU RPR w glucola:  ? Desires Sterilization:    Anatomy US: [facility] 25 cwd, anterior placenta, breech, limited views of face and profile and spine  FU US: ordered 2025     PROBLEM LIST/PLAN:   S/p gastric sleeve-avoid Glucola, check blood sugars 4 times daily 24 to 28 weeks.  Check vitamin levels at new OB visit and q tri   25 A1c wnl 4.4.  Vit d low- cont supplementation.  Nl iron, B12, folate, ferritin, calcium  Hx GHTN- Nl CMP and urine PC 0.06 at new OB visit.    81mg aspirin -continue  Anxiety/Depression- Doxylamine prn- continue.  Stable.  2025 would like to have zoloft prn to start,  Rx  Prior to preg  Dr. Robles counseling and Wellbutrin XL, Zoloft.  Stopped both meds in preg.   Hx sec tri loss-  NIPS low risk    Orders:  -     POC Urinalysis Dipstick  -     Alpha Fetoprotein, Maternal  -     US Ob Detail Fetal Anatomy Single or First Gestation; Future  -     sertraline (ZOLOFT) 25 MG tablet; Take 1 tablet by mouth Daily.  Dispense: 30 tablet; Refill: 2    2. Elevated fasting blood sugar  Overview:  5/12/2025 has not checking blood sugars secondary to some lightheadedness and dizziness and blackout events.  Notices feels the symptoms especially if blood sugars are higher.  Has been watching diet and they have improved.  Fastings .  4 out of 13 elevated.  Over the last 5 days all have been within normal limits.  2-hour postprandials 105-120.  Recommend high-protein snack before dinner.  Monitor diet with low carbs and low fats.  Increase exercise and consider nutrition consult if fastings persist elevated.  Blood sugar log provided.  Start checking 4 times a day.    6/13/2025 BS log at other office.  Nearly none high by report.  Will send log today.  Rec q1hr high pro snaks and check BS PRN.       3. Dysuria  -     Urine Culture - Urine, Urine, Clean Catch      Counseling:    Second trimester precautions    Reassuring pregnancy progress. Continue PNV.      Return in about 4 weeks (around 7/11/2025) for FU OB x2, next one w FU US.            Gertrude Bower, DO  06/13/2025    [facility] [place] [facility] [address] [facility]  [address] [place] [address] [address] [place] [address]  Dept: [phone]  Dept Fax: [phone]  Loc: [phone]  Loc Fax: [phone]

## 2025-06-13 ENCOUNTER — ROUTINE PRENATAL (OUTPATIENT)
Dept: OBSTETRICS AND GYNECOLOGY | Age: 30
End: 2025-06-13
Payer: COMMERCIAL

## 2025-06-13 VITALS — DIASTOLIC BLOOD PRESSURE: 74 MMHG | SYSTOLIC BLOOD PRESSURE: 111 MMHG | BODY MASS INDEX: 32.22 KG/M2 | WEIGHT: 231 LBS

## 2025-06-13 DIAGNOSIS — R30.0 DYSURIA: ICD-10-CM

## 2025-06-13 DIAGNOSIS — Z34.80 SUPERVISION OF OTHER NORMAL PREGNANCY, ANTEPARTUM: Primary | ICD-10-CM

## 2025-06-13 DIAGNOSIS — R73.01 ELEVATED FASTING BLOOD SUGAR: ICD-10-CM

## 2025-06-13 LAB
ALBUMIN SERPL-MCNC: 3.6 G/DL (ref 3.5–5.2)
ALBUMIN/GLOB SERPL: 1.2 G/DL
ALP SERPL-CCNC: 87 U/L (ref 39–117)
ALT SERPL W P-5'-P-CCNC: 11 U/L (ref 1–33)
ANION GAP SERPL CALCULATED.3IONS-SCNC: 11.7 MMOL/L (ref 5–15)
AST SERPL-CCNC: 22 U/L (ref 1–32)
BILIRUB SERPL-MCNC: 0.7 MG/DL (ref 0–1.2)
BUN SERPL-MCNC: 6 MG/DL (ref 6–20)
BUN/CREAT SERPL: 8.6 (ref 7–25)
CALCIUM SPEC-SCNC: 8.9 MG/DL (ref 8.6–10.5)
CHLORIDE SERPL-SCNC: 105 MMOL/L (ref 98–107)
CO2 SERPL-SCNC: 22.3 MMOL/L (ref 22–29)
CREAT SERPL-MCNC: 0.7 MG/DL (ref 0.57–1)
EGFRCR SERPLBLD CKD-EPI 2021: 118.7 ML/MIN/1.73
GLOBULIN UR ELPH-MCNC: 3 GM/DL
GLUCOSE SERPL-MCNC: 78 MG/DL (ref 65–99)
GLUCOSE UR STRIP-MCNC: NEGATIVE MG/DL
POTASSIUM SERPL-SCNC: 3.7 MMOL/L (ref 3.5–5.2)
PROT SERPL-MCNC: 6.6 G/DL (ref 6–8.5)
PROT UR STRIP-MCNC: NEGATIVE MG/DL
SODIUM SERPL-SCNC: 139 MMOL/L (ref 136–145)

## 2025-06-13 PROCEDURE — 80053 COMPREHEN METABOLIC PANEL: CPT | Performed by: OBSTETRICS & GYNECOLOGY

## 2025-06-13 PROCEDURE — 87086 URINE CULTURE/COLONY COUNT: CPT | Performed by: OBSTETRICS & GYNECOLOGY

## 2025-06-13 PROCEDURE — 82105 ALPHA-FETOPROTEIN SERUM: CPT | Performed by: OBSTETRICS & GYNECOLOGY

## 2025-06-13 RX ORDER — SERTRALINE HYDROCHLORIDE 25 MG/1
25 TABLET, FILM COATED ORAL DAILY
Qty: 30 TABLET | Refills: 2 | Status: SHIPPED | OUTPATIENT
Start: 2025-06-13

## 2025-06-15 LAB — BACTERIA SPEC AEROBE CULT: NO GROWTH

## 2025-06-17 ENCOUNTER — ROUTINE PRENATAL (OUTPATIENT)
Dept: OBSTETRICS AND GYNECOLOGY | Age: 30
End: 2025-06-17
Payer: COMMERCIAL

## 2025-06-17 VITALS — DIASTOLIC BLOOD PRESSURE: 70 MMHG | BODY MASS INDEX: 32.22 KG/M2 | SYSTOLIC BLOOD PRESSURE: 118 MMHG | WEIGHT: 231 LBS

## 2025-06-17 DIAGNOSIS — Z34.80 SUPERVISION OF OTHER NORMAL PREGNANCY, ANTEPARTUM: Primary | ICD-10-CM

## 2025-06-17 DIAGNOSIS — Z3A.21 21 WEEKS GESTATION OF PREGNANCY: ICD-10-CM

## 2025-06-17 NOTE — ASSESSMENT & PLAN NOTE
Has a sore lump in her right breast, only notices when she lays down.  Area of concern palpates cystic, similar area noted in left breast.  Offered ultrasound, patient will wait for now.

## 2025-06-17 NOTE — PROGRESS NOTES
OB FOLLOW UP        Chief Complaint   Patient presents with    Pregnancy Problem     Breast lump       Subjective:   Has a sore knot in her right breast, developed over the weekend.  Does not feel bad.     Objective:  /70   Wt 105 kg (231 lb)   LMP 01/21/2025 (Exact Date)   BMI 32.22 kg/m²   Uterine Size: size equals dates  FHT: 110-160 BPM  Area of concern in right breast palpates cystic/movable.  Similar area noted in left breast.   See OB flow for LE edema, cvx exam if performed, and Upro/Uglu    Assessment and Plan:  21w0d  Reassuring pregnancy progress.  Questions answered.  Diagnoses and all orders for this visit:    1. Supervision of other normal pregnancy, antepartum (Primary)  Overview:  LUIS M finalized: 10/28/2025 by LMP and 6-week ultrasound    Optional testing NIPS,CF/SMA,AFP: CF neg 2020.  NIPS low risk.  AFP 6/13/2025     COVID vaccine: Recommended  Flu vaccine: Vaccinated 2024-25  Tdap vaccine:    1hr Glucola:  NA, will plan BS checks for 1 week 24-28weeks  FU RPR w glucola:  ? Desires Sterilization:    Anatomy US: [facility] 6/13/25 cwd, anterior placenta, breech, limited views of face and profile and spine  FU US: ordered 6/13/2025     PROBLEM LIST/PLAN:   S/p gastric sleeve-avoid Glucola, check blood sugars 4 times daily 24 to 28 weeks.  Check vitamin levels at new OB visit and q tri   4/14/25 A1c wnl 4.4.  Vit d low- cont supplementation.  Nl iron, B12, folate, ferritin, calcium  Hx GHTN- Nl CMP and urine PC 0.06 at new OB visit.    81mg aspirin -continue  Anxiety/Depression- Doxylamine prn- continue.  Stable.  6/13/2025 would like to have zoloft prn to start, Rx  Prior to preg  Dr. Robles counseling and Wellbutrin XL, Zoloft.  Stopped both meds in preg.   Hx sec tri loss-  NIPS low risk    Assessment & Plan:  Has a sore lump in her right breast, only notices when she lays down.  Area of concern palpates cystic, similar area noted in left breast.  Offered ultrasound, patient will wait for now.          Counseling:    Second trimester precautions  Call with concerns  Continue PNV.  Importance of healthy eating and exercise.    Return for Keep scheduled follow up.            Samm Sauer, APRN  06/17/2025    [facility] [address]  [facility]  [address] [place] [address]  Dept: [phone]  Dept Fax: [phone]  Loc: [phone]

## 2025-06-18 LAB
AFP INTERP SERPL-IMP: NORMAL
AFP INTERP SERPL-IMP: NORMAL
AFP MOM SERPL: 1.27
AFP SERPL QL: NORMAL
AFP SERPL-MCNC: 58.5 NG/ML
AGE AT DELIVERY: 31.6 YR
GA METHOD: NORMAL
GA: 20.4 WEEKS
IDDM PATIENT QL: NO
MULTIPLE PREGNANCY: NORMAL
NEURAL TUBE DEFECT RISK FETUS: 5251 %
SERVICE CMNT-IMP: NORMAL

## 2025-07-07 DIAGNOSIS — R11.2 NAUSEA AND VOMITING, UNSPECIFIED VOMITING TYPE: ICD-10-CM

## 2025-07-07 RX ORDER — ONDANSETRON 4 MG/1
4 TABLET, FILM COATED ORAL DAILY PRN
Qty: 30 TABLET | Refills: 3 | Status: SHIPPED | OUTPATIENT
Start: 2025-07-07 | End: 2026-07-07

## 2025-07-08 NOTE — PROGRESS NOTES
OB FOLLOW UP      Chief Complaint   Patient presents with    Routine Prenatal Visit     Subjective:   Purvi Foreman is a 30 y.o.  24w3d patient being seen today for her routine obstetrical follow up visit.     No complaints.  Denies VB, leaking fluid, current regular cramping or contractions.    Not really able to check blood sugars as previously recommended.  Has not been eating large meals, just small frequent meals throughout the day.  Desires 1 hour Glucola today instead of checking BS.      Objective:  /74   Wt 107 kg (236 lb)   LMP 2025 (Exact Date)   BMI 32.92 kg/m²  Body mass index is 32.92 kg/m².  Chaperone present during pelvic exam if performed.  See OB flow sheet for fundal height (not performed if US day of OV), FHT, edema, cvx exam if performed, and Upro/Uglu.       Assessment and Plan:  24w3d     Diagnoses and all orders for this visit:    1. Supervision of other normal pregnancy, antepartum (Primary)  Overview:  LUIS M finalized: 10/28/2025 by LMP and 6-week ultrasound    CF neg .  NIPS low risk.  AFP neg    COVID vaccine: Recommended  Flu vaccine: Vaccinated   Tdap vaccine:    1hr Glucola:  2025 if elev, plan BS checks instead of 3hr glucola  FU RPR w glucola: 2025   ? Desires Sterilization: 2025 desires pw salpingectomy, poss occlusion    Anatomy US: [facility] [place] [facility] cwd, anterior placenta, breech, limited views of face and profile and spine  FU US: [facility] [place] [facility] 1#12oz, 79%, AC 82%, JED 21, breech, completed wnl    PROBLEM LIST/PLAN:   S/p gastric sleeve-avoid Glucola, check blood sugars 4 times daily 24 to 28 weeks.  Check vitamin levels at new OB visit and q tri   25 A1c wnl 4.4.  Vit d low- cont supplementation.  Nl iron, B12, folate, ferritin, calcium   2025 rechecked  Hx GHTN- Nl CMP and urine PC 0.06 at new OB visit.    81mg aspirin -continue  Anxiety/Depression- No meds.  Stable.    Prior to preg  Dr. Robles counseling, Wellbutrin  XL, Zoloft.     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 Low vitamin D level  Overview:  April 2025 18.3, Rx 1000 IU daily    Orders:  -     Vitamin D,25-Hydroxy    3. Nausea and vomiting, unspecified vomiting type  Comments:  stable, but still needing zofran, phenergan rarely  Overview:  Vit B6  Unisom- stopped, makes sleepy  Zofran 4mg q6hrs scheduled  Phenergan 25mg PO qhs        Counseling:    Second trimester precautions    Reassuring pregnancy progress. Continue PNV.      Return in about 4 weeks (around 8/8/2025) for FU OB x1, then [id number] x2.            Gertrude Bower,   07/11/2025    [facility] [address] [facility]  [address] [place] [address] [address] [place] [address]  Dept: [phone]  Dept Fax: [phone]  Loc: [phone]  Loc Fax: [phone]

## 2025-07-10 PROBLEM — Q91.7 TRISOMY 13: Status: RESOLVED | Noted: 2024-08-19 | Resolved: 2025-07-10

## 2025-07-11 ENCOUNTER — ROUTINE PRENATAL (OUTPATIENT)
Dept: OBSTETRICS AND GYNECOLOGY | Age: 30
End: 2025-07-11
Payer: COMMERCIAL

## 2025-07-11 VITALS — BODY MASS INDEX: 32.92 KG/M2 | WEIGHT: 236 LBS | SYSTOLIC BLOOD PRESSURE: 122 MMHG | DIASTOLIC BLOOD PRESSURE: 74 MMHG

## 2025-07-11 DIAGNOSIS — R11.2 NAUSEA AND VOMITING, UNSPECIFIED VOMITING TYPE: ICD-10-CM

## 2025-07-11 DIAGNOSIS — R79.89 LOW VITAMIN D LEVEL: ICD-10-CM

## 2025-07-11 DIAGNOSIS — Z34.80 SUPERVISION OF OTHER NORMAL PREGNANCY, ANTEPARTUM: Primary | ICD-10-CM

## 2025-07-11 LAB
25(OH)D3 SERPL-MCNC: 26 NG/ML (ref 30–100)
CALCIUM SPEC-SCNC: 8.2 MG/DL (ref 8.6–10.5)
DEPRECATED RDW RBC AUTO: 41 FL (ref 37–54)
ERYTHROCYTE [DISTWIDTH] IN BLOOD BY AUTOMATED COUNT: 12.8 % (ref 12.3–15.4)
FERRITIN SERPL-MCNC: 23.5 NG/ML (ref 13–150)
FOLATE SERPL-MCNC: 4.35 NG/ML (ref 4.78–24.2)
GLUCOSE 1H P GLC SERPL-MCNC: 104 MG/DL (ref 65–139)
GLUCOSE UR STRIP-MCNC: NEGATIVE MG/DL
HCT VFR BLD AUTO: 29.7 % (ref 34–46.6)
HGB BLD-MCNC: 10 G/DL (ref 12–15.9)
IRON 24H UR-MRATE: 53 MCG/DL (ref 37–145)
MCH RBC QN AUTO: 29.9 PG (ref 26.6–33)
MCHC RBC AUTO-ENTMCNC: 33.7 G/DL (ref 31.5–35.7)
MCV RBC AUTO: 88.9 FL (ref 79–97)
PLATELET # BLD AUTO: 179 10*3/MM3 (ref 140–450)
PMV BLD AUTO: 10.2 FL (ref 6–12)
PROT UR STRIP-MCNC: NEGATIVE MG/DL
RBC # BLD AUTO: 3.34 10*6/MM3 (ref 3.77–5.28)
TREPONEMA PALLIDUM IGG+IGM AB [PRESENCE] IN SERUM OR PLASMA BY IMMUNOASSAY: NORMAL
VIT B12 BLD-MCNC: 265 PG/ML (ref 211–946)
WBC NRBC COR # BLD AUTO: 4.76 10*3/MM3 (ref 3.4–10.8)

## 2025-07-11 PROCEDURE — 86780 TREPONEMA PALLIDUM: CPT | Performed by: OBSTETRICS & GYNECOLOGY

## 2025-07-11 PROCEDURE — 82310 ASSAY OF CALCIUM: CPT | Performed by: OBSTETRICS & GYNECOLOGY

## 2025-07-11 PROCEDURE — 82728 ASSAY OF FERRITIN: CPT | Performed by: OBSTETRICS & GYNECOLOGY

## 2025-07-11 PROCEDURE — 82306 VITAMIN D 25 HYDROXY: CPT | Performed by: OBSTETRICS & GYNECOLOGY

## 2025-07-11 PROCEDURE — 83540 ASSAY OF IRON: CPT | Performed by: OBSTETRICS & GYNECOLOGY

## 2025-07-11 PROCEDURE — 85027 COMPLETE CBC AUTOMATED: CPT | Performed by: OBSTETRICS & GYNECOLOGY

## 2025-07-11 PROCEDURE — 82950 GLUCOSE TEST: CPT | Performed by: OBSTETRICS & GYNECOLOGY

## 2025-07-11 PROCEDURE — 82607 VITAMIN B-12: CPT | Performed by: OBSTETRICS & GYNECOLOGY

## 2025-07-11 PROCEDURE — 82746 ASSAY OF FOLIC ACID SERUM: CPT | Performed by: OBSTETRICS & GYNECOLOGY

## 2025-07-12 PROBLEM — O99.019 MATERNAL ANEMIA IN PREGNANCY, ANTEPARTUM: Status: ACTIVE | Noted: 2025-07-12

## 2025-08-04 PROBLEM — R73.01 ELEVATED FASTING BLOOD SUGAR: Status: RESOLVED | Noted: 2025-05-12 | Resolved: 2025-08-04

## 2025-08-05 ENCOUNTER — ROUTINE PRENATAL (OUTPATIENT)
Dept: OBSTETRICS AND GYNECOLOGY | Age: 30
End: 2025-08-05
Payer: COMMERCIAL

## 2025-08-05 VITALS — WEIGHT: 239 LBS | SYSTOLIC BLOOD PRESSURE: 127 MMHG | DIASTOLIC BLOOD PRESSURE: 75 MMHG | BODY MASS INDEX: 33.33 KG/M2

## 2025-08-05 DIAGNOSIS — O99.019 MATERNAL ANEMIA IN PREGNANCY, ANTEPARTUM: ICD-10-CM

## 2025-08-05 DIAGNOSIS — Z34.80 SUPERVISION OF OTHER NORMAL PREGNANCY, ANTEPARTUM: Primary | ICD-10-CM

## 2025-08-05 DIAGNOSIS — Z23 NEED FOR TDAP VACCINATION: ICD-10-CM

## 2025-08-05 LAB
GLUCOSE UR STRIP-MCNC: NEGATIVE MG/DL
PROT UR STRIP-MCNC: NEGATIVE MG/DL

## 2025-08-07 DIAGNOSIS — L29.9 PRURITUS: Primary | ICD-10-CM

## 2025-08-07 DIAGNOSIS — Z3A.28 28 WEEKS GESTATION OF PREGNANCY: ICD-10-CM

## 2025-08-08 ENCOUNTER — CLINICAL SUPPORT (OUTPATIENT)
Dept: OBSTETRICS AND GYNECOLOGY | Age: 30
End: 2025-08-08
Payer: COMMERCIAL

## 2025-08-08 DIAGNOSIS — Z3A.28 28 WEEKS GESTATION OF PREGNANCY: ICD-10-CM

## 2025-08-08 DIAGNOSIS — L29.9 PRURITUS: ICD-10-CM

## 2025-08-08 LAB
ALBUMIN SERPL-MCNC: 3.3 G/DL (ref 3.5–5.2)
ALBUMIN/GLOB SERPL: 0.9 G/DL
ALP SERPL-CCNC: 88 U/L (ref 39–117)
ALT SERPL W P-5'-P-CCNC: 8 U/L (ref 1–33)
ANION GAP SERPL CALCULATED.3IONS-SCNC: 10.2 MMOL/L (ref 5–15)
AST SERPL-CCNC: 14 U/L (ref 1–32)
BILE AC SERPL-SCNC: 11 UMOL/L (ref 0–10)
BILIRUB SERPL-MCNC: 0.3 MG/DL (ref 0–1.2)
BUN SERPL-MCNC: 7 MG/DL (ref 6–20)
BUN/CREAT SERPL: 10.4 (ref 7–25)
CALCIUM SPEC-SCNC: 8.8 MG/DL (ref 8.6–10.5)
CHLORIDE SERPL-SCNC: 102 MMOL/L (ref 98–107)
CO2 SERPL-SCNC: 22.8 MMOL/L (ref 22–29)
CREAT SERPL-MCNC: 0.67 MG/DL (ref 0.57–1)
EGFRCR SERPLBLD CKD-EPI 2021: 120 ML/MIN/1.73
GLOBULIN UR ELPH-MCNC: 3.5 GM/DL
GLUCOSE SERPL-MCNC: 91 MG/DL (ref 65–99)
POTASSIUM SERPL-SCNC: 4 MMOL/L (ref 3.5–5.2)
PROT SERPL-MCNC: 6.8 G/DL (ref 6–8.5)
SODIUM SERPL-SCNC: 135 MMOL/L (ref 136–145)

## 2025-08-08 PROCEDURE — 80053 COMPREHEN METABOLIC PANEL: CPT | Performed by: OBSTETRICS & GYNECOLOGY

## 2025-08-08 PROCEDURE — 82239 BILE ACIDS TOTAL: CPT | Performed by: OBSTETRICS & GYNECOLOGY

## 2025-08-09 DIAGNOSIS — O26.643 CHOLESTASIS OF PREGNANCY IN THIRD TRIMESTER: Primary | ICD-10-CM

## 2025-08-09 RX ORDER — URSODIOL 300 MG/1
300 CAPSULE ORAL 2 TIMES DAILY
COMMUNITY

## 2025-08-12 ENCOUNTER — HOSPITAL ENCOUNTER (OUTPATIENT)
Facility: HOSPITAL | Age: 30
Discharge: HOME | End: 2025-08-12
Attending: OBSTETRICS & GYNECOLOGY | Admitting: OBSTETRICS & GYNECOLOGY
Payer: COMMERCIAL

## 2025-08-12 VITALS
RESPIRATION RATE: 16 BRPM | HEART RATE: 89 BPM | DIASTOLIC BLOOD PRESSURE: 80 MMHG | TEMPERATURE: 98.3 F | SYSTOLIC BLOOD PRESSURE: 114 MMHG | OXYGEN SATURATION: 98 %

## 2025-08-12 PROCEDURE — 59025 FETAL NON-STRESS TEST: CPT | Performed by: OBSTETRICS & GYNECOLOGY

## 2025-08-12 PROCEDURE — 59025 FETAL NON-STRESS TEST: CPT

## 2025-08-14 ENCOUNTER — LAB (OUTPATIENT)
Dept: OBSTETRICS AND GYNECOLOGY | Age: 30
End: 2025-08-14
Payer: COMMERCIAL

## 2025-08-14 DIAGNOSIS — O26.643 CHOLESTASIS OF PREGNANCY IN THIRD TRIMESTER: Primary | ICD-10-CM

## 2025-08-14 LAB
ALBUMIN SERPL-MCNC: 3.2 G/DL (ref 3.5–5.2)
ALBUMIN/GLOB SERPL: 0.8 G/DL
ALP SERPL-CCNC: 89 U/L (ref 39–117)
ALT SERPL W P-5'-P-CCNC: 7 U/L (ref 1–33)
ANION GAP SERPL CALCULATED.3IONS-SCNC: 9.3 MMOL/L (ref 5–15)
AST SERPL-CCNC: 15 U/L (ref 1–32)
BILE AC SERPL-SCNC: 10 UMOL/L (ref 0–10)
BILIRUB SERPL-MCNC: 0.4 MG/DL (ref 0–1.2)
BUN SERPL-MCNC: 7 MG/DL (ref 6–20)
BUN/CREAT SERPL: 10.6 (ref 7–25)
CALCIUM SPEC-SCNC: 8.9 MG/DL (ref 8.6–10.5)
CHLORIDE SERPL-SCNC: 107 MMOL/L (ref 98–107)
CO2 SERPL-SCNC: 20.7 MMOL/L (ref 22–29)
CREAT SERPL-MCNC: 0.66 MG/DL (ref 0.57–1)
EGFRCR SERPLBLD CKD-EPI 2021: 120.4 ML/MIN/1.73
GLOBULIN UR ELPH-MCNC: 3.8 GM/DL
GLUCOSE SERPL-MCNC: 99 MG/DL (ref 65–99)
POTASSIUM SERPL-SCNC: 3.8 MMOL/L (ref 3.5–5.2)
PROT SERPL-MCNC: 7 G/DL (ref 6–8.5)
SODIUM SERPL-SCNC: 137 MMOL/L (ref 136–145)

## 2025-08-14 PROCEDURE — 80053 COMPREHEN METABOLIC PANEL: CPT | Performed by: OBSTETRICS & GYNECOLOGY

## 2025-08-14 PROCEDURE — 82239 BILE ACIDS TOTAL: CPT | Performed by: OBSTETRICS & GYNECOLOGY

## 2025-08-18 PROBLEM — F41.9 ANXIETY IN PREGNANCY, ANTEPARTUM: Status: ACTIVE | Noted: 2025-08-18

## 2025-08-18 PROBLEM — O99.340 ANXIETY IN PREGNANCY, ANTEPARTUM: Status: ACTIVE | Noted: 2025-08-18

## 2025-08-18 PROBLEM — Z90.3 H/O GASTRIC SLEEVE: Status: ACTIVE | Noted: 2025-08-18

## 2025-08-19 ENCOUNTER — ROUTINE PRENATAL (OUTPATIENT)
Dept: OBSTETRICS AND GYNECOLOGY | Age: 30
End: 2025-08-19
Payer: COMMERCIAL

## 2025-08-19 VITALS — SYSTOLIC BLOOD PRESSURE: 111 MMHG | DIASTOLIC BLOOD PRESSURE: 70 MMHG | BODY MASS INDEX: 34.03 KG/M2 | WEIGHT: 244 LBS

## 2025-08-19 DIAGNOSIS — O99.340 ANXIETY IN PREGNANCY, ANTEPARTUM: ICD-10-CM

## 2025-08-19 DIAGNOSIS — Z34.80 SUPERVISION OF OTHER NORMAL PREGNANCY, ANTEPARTUM: Primary | ICD-10-CM

## 2025-08-19 DIAGNOSIS — F41.9 ANXIETY IN PREGNANCY, ANTEPARTUM: ICD-10-CM

## 2025-08-19 DIAGNOSIS — R79.89 LOW VITAMIN D LEVEL: ICD-10-CM

## 2025-08-19 DIAGNOSIS — Z90.3 H/O GASTRIC SLEEVE: ICD-10-CM

## 2025-08-19 DIAGNOSIS — O26.643 CHOLESTASIS OF PREGNANCY IN THIRD TRIMESTER: ICD-10-CM

## 2025-08-19 DIAGNOSIS — Z87.59 HISTORY OF GESTATIONAL HYPERTENSION: ICD-10-CM

## 2025-08-19 DIAGNOSIS — O99.019 MATERNAL ANEMIA IN PREGNANCY, ANTEPARTUM: ICD-10-CM

## 2025-08-19 LAB
GLUCOSE UR STRIP-MCNC: NEGATIVE MG/DL
PROT UR STRIP-MCNC: ABNORMAL MG/DL

## 2025-08-21 ENCOUNTER — HOSPITAL ENCOUNTER (OUTPATIENT)
Facility: HOSPITAL | Age: 30
Discharge: HOME | End: 2025-08-21
Attending: OBSTETRICS & GYNECOLOGY | Admitting: OBSTETRICS & GYNECOLOGY
Payer: COMMERCIAL

## 2025-08-21 ENCOUNTER — HOSPITAL ENCOUNTER (OUTPATIENT)
Dept: LABOR AND DELIVERY | Facility: HOSPITAL | Age: 30
Discharge: HOME | End: 2025-08-21
Payer: COMMERCIAL

## 2025-08-28 ENCOUNTER — HOSPITAL ENCOUNTER (OUTPATIENT)
Facility: HOSPITAL | Age: 30
Discharge: HOME | End: 2025-08-28
Attending: OBSTETRICS & GYNECOLOGY | Admitting: OBSTETRICS & GYNECOLOGY
Payer: COMMERCIAL

## 2025-08-28 ENCOUNTER — HOSPITAL ENCOUNTER (OUTPATIENT)
Dept: LABOR AND DELIVERY | Facility: HOSPITAL | Age: 30
Discharge: HOME | End: 2025-08-28
Payer: COMMERCIAL

## 2025-08-28 VITALS — HEART RATE: 86 BPM | SYSTOLIC BLOOD PRESSURE: 105 MMHG | DIASTOLIC BLOOD PRESSURE: 56 MMHG | RESPIRATION RATE: 18 BRPM

## 2025-08-28 PROCEDURE — 59025 FETAL NON-STRESS TEST: CPT

## (undated) DEVICE — IRRIGATOR BULB ASEPTO 60CC STRL

## (undated) DEVICE — GOWN,NON-REINFORCED,SIRUS,SET IN SLV,XL: Brand: MEDLINE

## (undated) DEVICE — BLCK/BITE BLOX W/DENTL/RIM W/STRAP 54F

## (undated) DEVICE — LN SMPL CO2 SHTRM SD STREAM W/M LUER

## (undated) DEVICE — MSK PROC CURAPLEX O2 2/ADAPT 7FT

## (undated) DEVICE — KT ORCA ORCAPOD DISP STRL

## (undated) DEVICE — DRAPE,UTILITY,TAPE,15X26,STERILE: Brand: MEDLINE

## (undated) DEVICE — VIOLET BRAIDED (POLYGLACTIN 910), SYNTHETIC ABSORBABLE SUTURE: Brand: COATED VICRYL

## (undated) DEVICE — DEV INFL CRE STERIFLATE 60CC DISP

## (undated) DEVICE — DECANTER BAG 9": Brand: MEDLINE INDUSTRIES, INC.

## (undated) DEVICE — ENDOPATH XCEL WITH OPTIVIEW TECHNOLOGY BLADELESS TROCARS WITH STABILITY SLEEVES: Brand: ENDOPATH XCEL OPTIVIEW

## (undated) DEVICE — FRCP BX RADJAW4 NDL 2.8 240CM LG OG BX40

## (undated) DEVICE — LAPAROSCOPIC DISSECTOR: Brand: DEROYAL

## (undated) DEVICE — GLV SURG SENSICARE POLYISPRN W/ALOE PF LF 6.5 GRN STRL

## (undated) DEVICE — SUT VIC 0 CT1 27IN DYED J340H

## (undated) DEVICE — BLCK/BITE BLOX WO/DENTL/RIM W/STRAP 54F

## (undated) DEVICE — SYR LUERLOK 20CC BX/50

## (undated) DEVICE — TROC BLADLES AIRSEAL/OPTI THRD 8X120MM 1P/U

## (undated) DEVICE — SUT SILK 0 SH 30IN K834H

## (undated) DEVICE — TUBING, SUCTION, 1/4" X 10', STRAIGHT: Brand: MEDLINE

## (undated) DEVICE — THE DEVICE IS A DISPOSABLE, LIGATURE PASSING, SUTURING APPARATUS AND NEEDLE GUIDE FOR THE ABDOMINAL WALL WHICH IS NON-POWERED, HAND-HELD, AND HAND-MANIPULATED,INTENDED TO BE USED IN VARIOUS GENERAL SURGICAL PROCEDURES. THE DEVICE INCLUDES A LIGATURE CARRIER PATHWAY, NEEDLE GUIDE, TWO NEEDLES, REFERENCE PLANE T-BAR, AND A GUIDEWIRE. THE HANDLE OF THE DEVICE PROVIDES TWO DIAMETRICALLY OPPOSED ENCLOSED GUIDEWAYS FOR THE ADVANCEMENT AND RETRACTION OF THE NEEDLES UNDER MANUAL CONTROL OF A PLUNGER LOCATED AT THE PROXIMAL END OF THE DEVICE.AS PART OF THE M-CLOSE CONVENIENCE KIT, A NERVE BLOCK NEEDLE IS INCLUDED FOR THE ADMINISTRATION OF LOCAL ANESTHETIC AGENTS TO PROVIDE REGIONAL AND LOCAL ANESTHESIA.  A TELFA ANTIMICROBIAL, NON-ADHERENT PAD IS ALSO PROVIDED IN THE KIT FOR USE AS A PRIMARY DRESSING FOR THE SURGICAL INCISION.: Brand: M-CLOSE KIT

## (undated) DEVICE — SEAL

## (undated) DEVICE — GLV SURG SENSICARE PI MIC PF SZ6 LF STRL

## (undated) DEVICE — ESOPHAGEAL BALLOON DILATATION CATHETER: Brand: CRE FIXED WIRE

## (undated) DEVICE — TROC STANDARDTROCAR FOR/TITANSGS 19MM DISP STRL

## (undated) DEVICE — 2, DISPOSABLE SUCTION/IRRIGATOR WITH DISPOSABLE TIP: Brand: STRYKEFLOW

## (undated) DEVICE — KIT,ANTI FOG,W/SPONGE & FLUID,SOFT PACK: Brand: MEDLINE

## (undated) DEVICE — MARKER,SKIN,WI/RULER AND LABELS: Brand: MEDLINE

## (undated) DEVICE — VESSEL SEALER EXTEND: Brand: ENDOWRIST

## (undated) DEVICE — BALN BOUGIE STD/TPR 38F

## (undated) DEVICE — SOL IRR SALINE 0.9% 100ML STRL

## (undated) DEVICE — GLV SURG SENSICARE PI PF LF 8.5 GRN STRL

## (undated) DEVICE — ST TBG AIRSEAL BIF FLTR W/ACT/CHARCOAL/FLTR

## (undated) DEVICE — 500ML,PRESSURE INFUSER W/STOPCOCK: Brand: MEDLINE

## (undated) DEVICE — GLV SURG SENSICARE PI MIC PF SZ8.5 LF STRL

## (undated) DEVICE — SENSR O2 OXIMAX FNGR A/ 18IN NONSTR

## (undated) DEVICE — PATIENT RETURN ELECTRODE, SINGLE-USE, CONTACT QUALITY MONITORING, ADULT, WITH 9FT CORD, FOR PATIENTS WEIGING OVER 33LBS. (15KG): Brand: MEGADYNE

## (undated) DEVICE — SUT MNCRYL PLS ANTIB UD 4/0 PS2 18IN

## (undated) DEVICE — NDL HYPO ECLPS SFTY 21G 1 1/2IN

## (undated) DEVICE — ADAPT CLN BIOGUARD AIR/H2O DISP

## (undated) DEVICE — SEALANT WND FIBRIN TISSEEL PREFIL/SYR/PRIMAFZ 2ML

## (undated) DEVICE — SOL NACL 0.9PCT 1000ML

## (undated) DEVICE — OSC ROBOT BARIATRIC: Brand: MEDLINE INDUSTRIES, INC.

## (undated) DEVICE — BLADELESS OBTURATOR: Brand: WECK VISTA

## (undated) DEVICE — APL DUPLOSPRAYER MIS 40CM

## (undated) DEVICE — ARM DRAPE

## (undated) DEVICE — CONN TBG Y 5 IN 1 LF STRL